# Patient Record
Sex: MALE | Race: BLACK OR AFRICAN AMERICAN | NOT HISPANIC OR LATINO | URBAN - METROPOLITAN AREA
[De-identification: names, ages, dates, MRNs, and addresses within clinical notes are randomized per-mention and may not be internally consistent; named-entity substitution may affect disease eponyms.]

---

## 2017-09-23 ENCOUNTER — HOSPITAL ENCOUNTER (INPATIENT)
Facility: HOSPITAL | Age: 52
LOS: 12 days | Discharge: NURSING, REHAB OR LONG-TERM CARE | DRG: 974 | End: 2017-10-05
Attending: INTERNAL MEDICINE | Admitting: INTERNAL MEDICINE
Payer: MEDICARE

## 2017-09-23 DIAGNOSIS — R40.2433 GLASGOW COMA SCALE TOTAL SCORE 3-8, AT HOSPITAL ADMISSION: ICD-10-CM

## 2017-09-23 DIAGNOSIS — D69.6 THROMBOCYTOPENIA: ICD-10-CM

## 2017-09-23 DIAGNOSIS — J96.01 ACUTE RESPIRATORY FAILURE WITH HYPOXIA AND HYPERCAPNIA: ICD-10-CM

## 2017-09-23 DIAGNOSIS — N18.6 ESRD (END STAGE RENAL DISEASE): ICD-10-CM

## 2017-09-23 DIAGNOSIS — J96.02 ACUTE RESPIRATORY FAILURE WITH HYPOXIA AND HYPERCAPNIA: ICD-10-CM

## 2017-09-23 DIAGNOSIS — B20 AIDS: ICD-10-CM

## 2017-09-23 DIAGNOSIS — I63.9 ISCHEMIC STROKE: ICD-10-CM

## 2017-09-23 DIAGNOSIS — I63.9 STROKE: ICD-10-CM

## 2017-09-23 DIAGNOSIS — R56.9 SEIZURE: ICD-10-CM

## 2017-09-23 DIAGNOSIS — I10 ESSENTIAL HYPERTENSION: ICD-10-CM

## 2017-09-23 DIAGNOSIS — G93.40 ACUTE ENCEPHALOPATHY: ICD-10-CM

## 2017-09-23 DIAGNOSIS — G40.901 STATUS EPILEPTICUS: ICD-10-CM

## 2017-09-23 LAB
ALBUMIN SERPL BCP-MCNC: 2 G/DL
ALP SERPL-CCNC: 101 U/L
ALT SERPL W/O P-5'-P-CCNC: 53 U/L
ANION GAP SERPL CALC-SCNC: 12 MMOL/L
ANISOCYTOSIS BLD QL SMEAR: SLIGHT
AST SERPL-CCNC: 106 U/L
BASOPHILS # BLD AUTO: ABNORMAL K/UL
BASOPHILS NFR BLD: 0 %
BILIRUB SERPL-MCNC: 0.4 MG/DL
BUN SERPL-MCNC: 35 MG/DL
CALCIUM SERPL-MCNC: 7.4 MG/DL
CHLORIDE SERPL-SCNC: 96 MMOL/L
CO2 SERPL-SCNC: 28 MMOL/L
CREAT SERPL-MCNC: 9.4 MG/DL
DIFFERENTIAL METHOD: ABNORMAL
EOSINOPHIL # BLD AUTO: ABNORMAL K/UL
EOSINOPHIL NFR BLD: 0 %
ERYTHROCYTE [DISTWIDTH] IN BLOOD BY AUTOMATED COUNT: 15.7 %
EST. GFR  (AFRICAN AMERICAN): 6.6 ML/MIN/1.73 M^2
EST. GFR  (NON AFRICAN AMERICAN): 5.7 ML/MIN/1.73 M^2
GLUCOSE SERPL-MCNC: 82 MG/DL
HCT VFR BLD AUTO: 25.7 %
HGB BLD-MCNC: 8.4 G/DL
LYMPHOCYTES # BLD AUTO: ABNORMAL K/UL
LYMPHOCYTES NFR BLD: 9 %
MCH RBC QN AUTO: 31.3 PG
MCHC RBC AUTO-ENTMCNC: 32.7 G/DL
MCV RBC AUTO: 96 FL
METAMYELOCYTES NFR BLD MANUAL: 2 %
MONOCYTES # BLD AUTO: ABNORMAL K/UL
MONOCYTES NFR BLD: 18 %
MYELOCYTES NFR BLD MANUAL: 4 %
NEUTROPHILS NFR BLD: 65 %
NEUTS BAND NFR BLD MANUAL: 2 %
OVALOCYTES BLD QL SMEAR: ABNORMAL
PLATELET # BLD AUTO: 63 K/UL
PLATELET BLD QL SMEAR: ABNORMAL
PMV BLD AUTO: ABNORMAL FL
POIKILOCYTOSIS BLD QL SMEAR: SLIGHT
POTASSIUM SERPL-SCNC: 3.9 MMOL/L
PROT SERPL-MCNC: 8 G/DL
RBC # BLD AUTO: 2.68 M/UL
SODIUM SERPL-SCNC: 136 MMOL/L
T4 FREE SERPL-MCNC: 0.94 NG/DL
TSH SERPL DL<=0.005 MIU/L-ACNC: 4.21 UIU/ML
WBC # BLD AUTO: 3.27 K/UL

## 2017-09-23 PROCEDURE — 86361 T CELL ABSOLUTE COUNT: CPT

## 2017-09-23 PROCEDURE — 86592 SYPHILIS TEST NON-TREP QUAL: CPT

## 2017-09-23 PROCEDURE — 25000003 PHARM REV CODE 250: Performed by: STUDENT IN AN ORGANIZED HEALTH CARE EDUCATION/TRAINING PROGRAM

## 2017-09-23 PROCEDURE — 85027 COMPLETE CBC AUTOMATED: CPT

## 2017-09-23 PROCEDURE — 80053 COMPREHEN METABOLIC PANEL: CPT

## 2017-09-23 PROCEDURE — 84439 ASSAY OF FREE THYROXINE: CPT

## 2017-09-23 PROCEDURE — 11000001 HC ACUTE MED/SURG PRIVATE ROOM

## 2017-09-23 PROCEDURE — 36415 COLL VENOUS BLD VENIPUNCTURE: CPT

## 2017-09-23 PROCEDURE — 87040 BLOOD CULTURE FOR BACTERIA: CPT | Mod: 59

## 2017-09-23 PROCEDURE — 63600175 PHARM REV CODE 636 W HCPCS: Performed by: STUDENT IN AN ORGANIZED HEALTH CARE EDUCATION/TRAINING PROGRAM

## 2017-09-23 PROCEDURE — 86777 TOXOPLASMA ANTIBODY: CPT

## 2017-09-23 PROCEDURE — 85007 BL SMEAR W/DIFF WBC COUNT: CPT

## 2017-09-23 PROCEDURE — 84443 ASSAY THYROID STIM HORMONE: CPT

## 2017-09-23 RX ORDER — LEVETIRACETAM 10 MG/ML
1000 INJECTION INTRAVASCULAR EVERY 12 HOURS
Status: DISCONTINUED | OUTPATIENT
Start: 2017-09-23 | End: 2017-09-23

## 2017-09-23 RX ORDER — LEVETIRACETAM 500 MG/1
1000 TABLET ORAL 2 TIMES DAILY
Status: DISCONTINUED | OUTPATIENT
Start: 2017-09-23 | End: 2017-09-25

## 2017-09-23 RX ORDER — HYDRALAZINE HYDROCHLORIDE 50 MG/1
50 TABLET, FILM COATED ORAL ONCE
Status: COMPLETED | OUTPATIENT
Start: 2017-09-24 | End: 2017-09-24

## 2017-09-23 RX ORDER — HYDRALAZINE HYDROCHLORIDE 50 MG/1
50 TABLET, FILM COATED ORAL EVERY 8 HOURS
Status: DISCONTINUED | OUTPATIENT
Start: 2017-09-23 | End: 2017-09-23

## 2017-09-23 RX ORDER — ATORVASTATIN CALCIUM 10 MG/1
10 TABLET, FILM COATED ORAL NIGHTLY
Status: DISCONTINUED | OUTPATIENT
Start: 2017-09-23 | End: 2017-09-25

## 2017-09-23 RX ORDER — HYDRALAZINE HYDROCHLORIDE 50 MG/1
100 TABLET, FILM COATED ORAL EVERY 8 HOURS
Status: DISCONTINUED | OUTPATIENT
Start: 2017-09-24 | End: 2017-09-25

## 2017-09-23 RX ADMIN — LEVETIRACETAM 1000 MG: 500 TABLET ORAL at 11:09

## 2017-09-23 RX ADMIN — HYDRALAZINE HYDROCHLORIDE 50 MG: 50 TABLET ORAL at 11:09

## 2017-09-23 RX ADMIN — CEFTRIAXONE 2 G: 2 INJECTION, SOLUTION INTRAVENOUS at 11:09

## 2017-09-23 RX ADMIN — ATORVASTATIN CALCIUM 10 MG: 10 TABLET, FILM COATED ORAL at 11:09

## 2017-09-23 NOTE — PLAN OF CARE
Outside Transfer Acceptance Note    Transferring Physician or Mid Level Provider/Speciality: Dr. Thomas Gay/Hospital Medicine    Accepting Physician: Anca Noble     Date of Acceptance: 09/23/2017     Transferring Facility/Hospital: [facility]    Reason for Transfer to [facility]: Neurosurgery and Infectious Diseases evaluation    Report from Transferring Physician or Mid-Level provider/Hospital course: A 52-year-old male  with AIDS (CD 49), not on ART or OI prophylaxis, ESRD on HD (M,W,F), and seizure disorder who was at [facility] for arranged HD sessions. On routine laboratory work up he was found to be thrombocytopenic at 19,000 and with hypertensive urgency. He was admitted for further management with control of blood pressure and thrombocytopenia. However his hospital course was complicated by encephalopathy with alternating mentation between his baseline and lethargy. Diagnostic non contrast MRI revealed multiple areas concerning for ischemic stroke from a possible cardio embolic source. Secondary stroke prophylaxis started with aspirin was begun and TTE negative for thrombotic etiologies. Mentation improved however, 24 hours ago patient was once again lethargic and confused. Recommendation was given for LP but due to PLT of 52,000 service with capacity to perform felt uncomfortable due to lack of Neurosurgery. No current evidence of seizure activity. Unclear if patient had EEG performed.     In addition, patient was febrile on admission and several days thereafter with temperature ranging up to 102.9. Urinalysis with pyuria. He was started on empiric ceftriaxone 2 gm BID and Vancomycin post HD. After blood cultures were NGTD vancomycin was discontinued. However, ceftriaxone therapy was continued. Urine culture was NG.     To do list upon patient arrival:    -Consult Neurosurgery or appropriate service for LP   -Consider ID service evaluation   -Consider  repeating TTE or performing MARISA if indicated   -Assess need for continued antibiotic therapy    -Consult Nephology service to resume intermittent HD   -Consider toxoplasma IgG, AFB blood culture, and other infectious work up as indicated    Please call extension [phone] upon patient arrival to floor for Hospital Medicine admit team assignment and for additional admit orders. If patient is coming from another [facility] facility please also call [phone] to inform the admit team/office that patient has arrived from the [facility] facility to the floor so patient can be evaluated.

## 2017-09-24 PROBLEM — B20 AIDS: Status: ACTIVE | Noted: 2017-09-24

## 2017-09-24 PROBLEM — G93.40 ACUTE ENCEPHALOPATHY: Status: ACTIVE | Noted: 2017-09-24

## 2017-09-24 PROBLEM — R56.9 SEIZURE: Status: ACTIVE | Noted: 2017-09-24

## 2017-09-24 PROBLEM — N18.6 ESRD (END STAGE RENAL DISEASE): Status: ACTIVE | Noted: 2017-09-24

## 2017-09-24 PROBLEM — I10 HTN (HYPERTENSION): Status: ACTIVE | Noted: 2017-09-24

## 2017-09-24 LAB
ALBUMIN SERPL BCP-MCNC: 2 G/DL
AMPHET+METHAMPHET UR QL: NEGATIVE
ANION GAP SERPL CALC-SCNC: 17 MMOL/L
ANISOCYTOSIS BLD QL SMEAR: SLIGHT
BARBITURATES UR QL SCN>200 NG/ML: NEGATIVE
BASOPHILS # BLD AUTO: ABNORMAL K/UL
BASOPHILS NFR BLD: 0 %
BENZODIAZ UR QL SCN>200 NG/ML: NEGATIVE
BUN SERPL-MCNC: 39 MG/DL
BZE UR QL SCN: NEGATIVE
CALCIUM SERPL-MCNC: 7.5 MG/DL
CANNABINOIDS UR QL SCN: NEGATIVE
CHLORIDE SERPL-SCNC: 96 MMOL/L
CO2 SERPL-SCNC: 24 MMOL/L
CREAT SERPL-MCNC: 10.1 MG/DL
CREAT UR-MCNC: 56 MG/DL
CRYPTOC AG SER QL LA: NEGATIVE
DIFFERENTIAL METHOD: ABNORMAL
EOSINOPHIL # BLD AUTO: ABNORMAL K/UL
EOSINOPHIL NFR BLD: 1 %
ERYTHROCYTE [DISTWIDTH] IN BLOOD BY AUTOMATED COUNT: 15.6 %
EST. GFR  (AFRICAN AMERICAN): 6 ML/MIN/1.73 M^2
EST. GFR  (NON AFRICAN AMERICAN): 5.2 ML/MIN/1.73 M^2
ETHANOL UR-MCNC: <10 MG/DL
GLUCOSE SERPL-MCNC: 87 MG/DL
HCT VFR BLD AUTO: 27.7 %
HGB BLD-MCNC: 9 G/DL
HYPOCHROMIA BLD QL SMEAR: ABNORMAL
LYMPHOCYTES # BLD AUTO: ABNORMAL K/UL
LYMPHOCYTES NFR BLD: 11 %
MCH RBC QN AUTO: 31.3 PG
MCHC RBC AUTO-ENTMCNC: 32.5 G/DL
MCV RBC AUTO: 96 FL
METAMYELOCYTES NFR BLD MANUAL: 2 %
METHADONE UR QL SCN>300 NG/ML: NEGATIVE
MONOCYTES # BLD AUTO: ABNORMAL K/UL
MONOCYTES NFR BLD: 12 %
MYELOCYTES NFR BLD MANUAL: 1 %
NEUTROPHILS NFR BLD: 69 %
NEUTS BAND NFR BLD MANUAL: 4 %
OPIATES UR QL SCN: NEGATIVE
OVALOCYTES BLD QL SMEAR: ABNORMAL
PCP UR QL SCN>25 NG/ML: NEGATIVE
PHOSPHATE SERPL-MCNC: 4.9 MG/DL
PLATELET # BLD AUTO: 57 K/UL
PMV BLD AUTO: ABNORMAL FL
POCT GLUCOSE: 86 MG/DL (ref 70–110)
POCT GLUCOSE: 92 MG/DL (ref 70–110)
POIKILOCYTOSIS BLD QL SMEAR: SLIGHT
POLYCHROMASIA BLD QL SMEAR: ABNORMAL
POTASSIUM SERPL-SCNC: 3.7 MMOL/L
PROCALCITONIN SERPL IA-MCNC: >100 NG/ML
RBC # BLD AUTO: 2.88 M/UL
SODIUM SERPL-SCNC: 137 MMOL/L
TOXICOLOGY INFORMATION: NORMAL
WBC # BLD AUTO: 3.41 K/UL

## 2017-09-24 PROCEDURE — 80307 DRUG TEST PRSMV CHEM ANLYZR: CPT

## 2017-09-24 PROCEDURE — 87040 BLOOD CULTURE FOR BACTERIA: CPT

## 2017-09-24 PROCEDURE — 80069 RENAL FUNCTION PANEL: CPT

## 2017-09-24 PROCEDURE — 99223 1ST HOSP IP/OBS HIGH 75: CPT | Mod: AI,GC,, | Performed by: HOSPITALIST

## 2017-09-24 PROCEDURE — 99223 1ST HOSP IP/OBS HIGH 75: CPT | Mod: ,,, | Performed by: PSYCHIATRY & NEUROLOGY

## 2017-09-24 PROCEDURE — 86403 PARTICLE AGGLUT ANTBDY SCRN: CPT

## 2017-09-24 PROCEDURE — 25000003 PHARM REV CODE 250: Performed by: STUDENT IN AN ORGANIZED HEALTH CARE EDUCATION/TRAINING PROGRAM

## 2017-09-24 PROCEDURE — 85027 COMPLETE CBC AUTOMATED: CPT

## 2017-09-24 PROCEDURE — 36415 COLL VENOUS BLD VENIPUNCTURE: CPT

## 2017-09-24 PROCEDURE — 84145 PROCALCITONIN (PCT): CPT

## 2017-09-24 PROCEDURE — 11000001 HC ACUTE MED/SURG PRIVATE ROOM

## 2017-09-24 PROCEDURE — 63600175 PHARM REV CODE 636 W HCPCS: Performed by: STUDENT IN AN ORGANIZED HEALTH CARE EDUCATION/TRAINING PROGRAM

## 2017-09-24 PROCEDURE — 85007 BL SMEAR W/DIFF WBC COUNT: CPT

## 2017-09-24 RX ORDER — NAPROXEN SODIUM 220 MG/1
81 TABLET, FILM COATED ORAL DAILY
Status: ON HOLD | COMMUNITY
End: 2017-09-25

## 2017-09-24 RX ORDER — AMLODIPINE BESYLATE 5 MG/1
5 TABLET ORAL DAILY
Status: ON HOLD | COMMUNITY
End: 2017-09-25

## 2017-09-24 RX ORDER — LAMIVUDINE AND ZIDOVUDINE 150; 300 MG/1; MG/1
1 TABLET, FILM COATED ORAL EVERY 12 HOURS
Status: ON HOLD | COMMUNITY
End: 2017-10-05 | Stop reason: HOSPADM

## 2017-09-24 RX ORDER — LOPINAVIR AND RITONAVIR 200; 50 MG/1; MG/1
2 TABLET, FILM COATED ORAL 2 TIMES DAILY
Status: ON HOLD | COMMUNITY
End: 2017-09-25

## 2017-09-24 RX ORDER — AMLODIPINE BESYLATE 5 MG/1
5 TABLET ORAL DAILY
Status: DISCONTINUED | OUTPATIENT
Start: 2017-09-24 | End: 2017-09-25

## 2017-09-24 RX ORDER — CLONIDINE HYDROCHLORIDE 0.1 MG/1
0.1 TABLET ORAL 2 TIMES DAILY
Status: ON HOLD | COMMUNITY
End: 2017-10-05 | Stop reason: HOSPADM

## 2017-09-24 RX ORDER — HYDRALAZINE HYDROCHLORIDE 50 MG/1
50 TABLET, FILM COATED ORAL EVERY 6 HOURS
Status: ON HOLD | COMMUNITY
End: 2017-10-05 | Stop reason: HOSPADM

## 2017-09-24 RX ORDER — DAPSONE 25 MG/1
50 TABLET ORAL DAILY
Status: DISCONTINUED | OUTPATIENT
Start: 2017-09-24 | End: 2017-09-25

## 2017-09-24 RX ORDER — LEUCOVORIN CALCIUM 25 MG/1
25 TABLET ORAL WEEKLY
Status: DISCONTINUED | OUTPATIENT
Start: 2017-09-26 | End: 2017-09-25

## 2017-09-24 RX ORDER — AZITHROMYCIN 600 MG/1
1200 TABLET, FILM COATED ORAL WEEKLY
Status: DISCONTINUED | OUTPATIENT
Start: 2017-09-26 | End: 2017-09-25

## 2017-09-24 RX ORDER — PYRIMETHAMINE 25 MG/1
50 TABLET ORAL WEEKLY
Status: DISCONTINUED | OUTPATIENT
Start: 2017-09-26 | End: 2017-09-25

## 2017-09-24 RX ORDER — HYDRALAZINE HYDROCHLORIDE 10 MG/1
10 TABLET, FILM COATED ORAL ONCE
Status: COMPLETED | OUTPATIENT
Start: 2017-09-24 | End: 2017-09-24

## 2017-09-24 RX ORDER — LOSARTAN POTASSIUM 50 MG/1
50 TABLET ORAL DAILY
Status: ON HOLD | COMMUNITY
End: 2017-09-25

## 2017-09-24 RX ORDER — ATORVASTATIN CALCIUM 10 MG/1
10 TABLET, FILM COATED ORAL DAILY
Status: ON HOLD | COMMUNITY
End: 2017-09-25

## 2017-09-24 RX ORDER — LEVETIRACETAM 1000 MG/1
500 TABLET ORAL
COMMUNITY

## 2017-09-24 RX ADMIN — AMLODIPINE BESYLATE 5 MG: 5 TABLET ORAL at 01:09

## 2017-09-24 RX ADMIN — HYDRALAZINE HYDROCHLORIDE 10 MG: 10 TABLET, FILM COATED ORAL at 09:09

## 2017-09-24 RX ADMIN — DAPSONE 50 MG: 25 TABLET ORAL at 03:09

## 2017-09-24 RX ADMIN — HYDRALAZINE HYDROCHLORIDE 50 MG: 50 TABLET ORAL at 12:09

## 2017-09-24 RX ADMIN — CEFTRIAXONE 2 G: 2 INJECTION, SOLUTION INTRAVENOUS at 11:09

## 2017-09-24 RX ADMIN — HYDRALAZINE HYDROCHLORIDE 100 MG: 50 TABLET ORAL at 01:09

## 2017-09-24 RX ADMIN — LEVETIRACETAM 1000 MG: 500 TABLET ORAL at 09:09

## 2017-09-24 RX ADMIN — HYDRALAZINE HYDROCHLORIDE 100 MG: 50 TABLET ORAL at 09:09

## 2017-09-24 RX ADMIN — HYDRALAZINE HYDROCHLORIDE 100 MG: 50 TABLET ORAL at 05:09

## 2017-09-24 RX ADMIN — ATORVASTATIN CALCIUM 10 MG: 10 TABLET, FILM COATED ORAL at 09:09

## 2017-09-24 NOTE — ASSESSMENT & PLAN NOTE
- Previous history of poorly controlled resulting in ESRD  - OSH noted fluctuations and was previously on Hydralzine 100 Q8  - Continue hydralazine 100 Q8.

## 2017-09-24 NOTE — PLAN OF CARE
Problem: Patient Care Overview  Goal: Plan of Care Review  Outcome: Ongoing (interventions implemented as appropriate)  POC reviewed with pt and family at 1400. Straight cath.  Urinalysis.  PB control.  Food encouraged. Pt was confused. Questions and concerns addressed. No acute events today. Pt progressing toward goals. Will continue to monitor. See flowsheets for full assessment and VS info.

## 2017-09-24 NOTE — ASSESSMENT & PLAN NOTE
- Platelet count noted to be low at OSH 19 on admit, attributed to AIDS  - Given transfusion at OSH   - Will continue to monitor and transfuse at < 12 or < 50 if bleeding

## 2017-09-24 NOTE — SUBJECTIVE & OBJECTIVE
Past Medical History:   Diagnosis Date    ESRD on hemodialysis     HIV (human immunodeficiency virus infection)     Seizures        History reviewed. No pertinent surgical history.    Review of patient's allergies indicates:  No Known Allergies    Current Neurological Medications:     No current facility-administered medications on file prior to encounter.      No current outpatient prescriptions on file prior to encounter.     Family History     Family history is unknown by patient.        Social History Main Topics    Smoking status: Never Smoker    Smokeless tobacco: Never Used    Alcohol use No    Drug use: No    Sexual activity: Yes     Partners: Female     Review of Systems   Constitutional: Positive for fatigue. Negative for chills and fever.   HENT: Negative for trouble swallowing and voice change.    Eyes: Negative for photophobia and visual disturbance.   Respiratory: Negative for shortness of breath.    Cardiovascular: Negative for chest pain.   Gastrointestinal: Negative for abdominal distention and abdominal pain.   Genitourinary: Negative for dysuria.   Musculoskeletal: Negative for back pain.   Neurological: Positive for weakness. Negative for dizziness, tremors, seizures, syncope, facial asymmetry, speech difficulty, light-headedness, numbness and headaches.   Psychiatric/Behavioral: Positive for decreased concentration. Negative for agitation, confusion and hallucinations. The patient is not nervous/anxious.      Objective:     Vital Signs (Most Recent):  Temp: 98.1 °F (36.7 °C) (09/24/17 0722)  Pulse: 89 (09/24/17 0722)  Resp: 18 (09/24/17 0722)  BP: (!) 177/115 (09/24/17 0934)  SpO2: 96 % (09/24/17 0722) Vital Signs (24h Range):  Temp:  [97.6 °F (36.4 °C)-98.6 °F (37 °C)] 98.1 °F (36.7 °C)  Pulse:  [86-99] 89  Resp:  [16-20] 18  SpO2:  [96 %-99 %] 96 %  BP: (155-177)/(100-115) 177/115     Weight: 70.6 kg (155 lb 9.6 oz)  Body mass index is 21.1 kg/m².    Physical Exam   Constitutional: No  distress.   HENT:   Head: Normocephalic and atraumatic.   Eyes: EOM are normal. Pupils are equal, round, and reactive to light.   Neck: Normal range of motion.   Cardiovascular: Normal heart sounds.    Pulmonary/Chest: He has wheezes.   Abdominal: Bowel sounds are normal.   Neurological: Finger-nose-finger test: Mild Ataxia    Reflex Scores:       Tricep reflexes are 3+ on the right side and 3+ on the left side.       Bicep reflexes are 3+ on the right side and 3+ on the left side.       Brachioradialis reflexes are 3+ on the right side and 3+ on the left side.       Patellar reflexes are 3+ on the right side and 3+ on the left side.       Achilles reflexes are 3+ on the right side and 3+ on the left side.  Psychiatric: His speech is slurred.       NEUROLOGICAL EXAMINATION:     MENTAL STATUS   Oriented to person.   Oriented to place.   Disoriented to month and date. Oriented to year.   Registration: recalls 1 of 3 objects. Recall at 5 minutes: recalls 1 of 3 objects. Follows 1 step commands.   Attention: decreased. Concentration: decreased.   Speech: slurred   Level of consciousness: drowsy  Able to name object. Able to repeat.     CRANIAL NERVES     CN II   Visual acuity: decreased    CN III, IV, VI   Pupils are equal, round, and reactive to light.  Extraocular motions are normal.   Nystagmus: none   Ophthalmoparesis: none  Vestibulo-ocular reflex: present    CN V   Facial sensation intact.     CN VII   Facial expression full, symmetric.     CN VIII   CN VIII normal.     CN IX, X   CN IX normal.   CN X normal.     CN XI   CN XI normal.     CN XII   CN XII normal.     MOTOR EXAM   Muscle bulk: normal  Overall muscle tone: decreased    Strength   Strength 5/5 except as noted.        Distal hand weakness RT > LT     REFLEXES     Reflexes   Right brachioradialis: 3+  Left brachioradialis: 3+  Right biceps: 3+  Left biceps: 3+  Right triceps: 3+  Left triceps: 3+  Right patellar: 3+  Left patellar: 3+  Right achilles:  3+  Left achilles: 3+  Right plantar: equivocal  Left plantar: equivocal  Right Bates: present  Left Bates: present  Right ankle clonus: absent  Left ankle clonus: absent    SENSORY EXAM   Vibration normal.   Pinprick normal.     GAIT AND COORDINATION     Gait  Gait: (NT)     Coordination   Finger-nose-finger test: Mild Ataxia     Tremor   Resting tremor: absent  Intention tremor: absent      Significant Labs:   CBC:   Recent Labs  Lab 09/23/17 2039 09/24/17  0351   WBC 3.27* 3.41*   HGB 8.4* 9.0*   HCT 25.7* 27.7*   PLT 63* 57*     CMP:   Recent Labs  Lab 09/23/17 2039 09/24/17  0351   GLU 82 87    137   K 3.9 3.7   CL 96 96   CO2 28 24   BUN 35* 39*   CREATININE 9.4* 10.1*   CALCIUM 7.4* 7.5*   PROT 8.0  --    ALBUMIN 2.0* 2.0*   BILITOT 0.4  --    ALKPHOS 101  --    *  --    ALT 53*  --    ANIONGAP 12 17*   EGFRNONAA 5.7* 5.2*     CSF Culture: No results for input(s): CSFCULTURE in the last 48 hours.  CSF Studies: No results for input(s): ALIQUT, APPEARCSF, COLORCSF, CSFWBC, CSFRBC, GLUCCSF, LDHCSF, PROTEINCSF, VDRLCSF in the last 48 hours.  Urine Studies: No results for input(s): COLORU, APPEARANCEUA, PHUR, SPECGRAV, PROTEINUA, GLUCUA, KETONESU, BILIRUBINUA, OCCULTUA, NITRITE, UROBILINOGEN, LEUKOCYTESUR, RBCUA, WBCUA, BACTERIA, SQUAMEPITHEL, HYALINECASTS in the last 48 hours.    Invalid input(s): WRIGHTSUR  All pertinent lab results from the past 24 hours have been reviewed.    Significant Imaging: I have reviewed and interpreted all pertinent imaging results/findings within the past 24 hours.

## 2017-09-24 NOTE — PROGRESS NOTES
Upon assessment, Pt. Has dime-sized pustule, raised about 5 cm, on the head of the penis, under the foreskin.     He also has no appetite and is becoming increasingly lethargic.    Will continue to monitor.

## 2017-09-24 NOTE — ASSESSMENT & PLAN NOTE
- 53 yearold male with AIDS (CD 49 not on ART or PPx), ESRD on HD (M,W,F), and seizure disorder presenting as a transfer from [facility] for further evaluation for encephaopathy.   - Significant events at outside hospital includes fluctuating level of consciousness / encephalopathic state with multifactorial etiologies HTN urgency / ischemic stroke (watershed infarcts) / ? NCSE / infections (possible UTI source / undone meningitis work up).     - examination reveals improved level of awareness / consciousness. However with attention / concentration / recent memory affect, with UMN findings correlating with watershed infarcts.   - MRI Brain wo contrast outside negative for space occupying lesions     - DDx Dementia complex from AIDS/HIV complex vs encephalitis from infectious vs toxic vs metabolic vs space occupying lesions     Plan:   - MRI Brain w wo contrast for evaluations of mass / contrast enhancing lesions / sub-acute encephalitis / meningeal enhancements  - Consult ID for recs / HIV management    - Serum studies (if not done outside):   ---- HSV / VZV / CMV / WNV / cryptococcal / toxoplasmosis / syphilis / TB / utox / vasculitis (ANCA) / autoimmune encephalitis / culutres bacteria / fungal   - Perform LP studies   ---- cell count / diff / glucose / protein / cultures / gram stain   ---- HSV / VZV / CMV / WNV / Enterovirus / Arbo cryptococcal / toxoplasmosis / syphilis / TB / utox / vasculitis (ANCA) / autoimmune encephalitis / culutres bacteria / fungal     - Stroke work-up (more likely watershed / hypoperfusion with underlying chronic HTN changes)  ---- MRI brain w wo contrast / MRA head / neck   ---- TTE to rule out source / cardiac monitoring / A1c, lipid panel   ---- ASA / statins if not contraindicated   ---- Vascular neurology consult      - EEG rotuine post MRI for eval of epileptiform activitty   - seizure / delirium precautions / avoid metabolic abnormalities / nephro consult for HD /  avoid hypoxias / hypercapnia

## 2017-09-24 NOTE — ASSESSMENT & PLAN NOTE
- History unclear in OSH chart, Presented someone altered, unknown baseline, got worst in hospital s/p seizure with waxing and waning   - Patient notes clumsiness and trouble with fine motor skills at baseline 2/2 to neck injury years ago   - Given history feel HIV encephalopathy, seizure or medication induced is most likely cause of lethargy, however given immunocompromised status feel LP can help rule out concern for infection/PML given white mater involvement on MRI and concern for chronic worsening mental status.     - MRI impression showed abnormal gyriform diffusion and abnormal white matter, periventricular with extension into left and right basal ganglia.  Concern for ischemia/infarct, seizure, and cerebritis  - Neurology at OSH originally attributed behavior to CVA followed by stroke and treated with Keppra, however patient continued to wax and wane.  - Neurology recommend LP, however was uncomfortable given thrombocytopenia and no backup neurosurgery, so they transferred him to [facility] main.    - Consult Neurology in the AM, Obtain LP in AM, Speak with wife regarding mental baseline (number in sticky)  - Consider repeat TTE or MARISA given concern for recurrent stroke.

## 2017-09-24 NOTE — SUBJECTIVE & OBJECTIVE
Past Medical History:   Diagnosis Date    ESRD on hemodialysis     HIV (human immunodeficiency virus infection)     Seizures        No past surgical history on file.    Review of patient's allergies indicates:  No Known Allergies    Unaware of outpatient medications    OSH   Ceftriaxone 2g BID, Vanc 1g with dialysis, Keppra 1000mg BID, Hydralazine 100 Q8, Atorvastatin 10 QHS,  ASA 81 daily.     Family History     None        Social History Main Topics    Smoking status: Not on file    Smokeless tobacco: Not on file    Alcohol use Not on file    Drug use: Unknown    Sexual activity: Not on file     Review of Systems   Constitutional: Negative for fatigue and fever.   HENT: Negative for congestion, hearing loss and sinus pressure.    Eyes: Negative for pain and visual disturbance.   Respiratory: Negative for chest tightness and shortness of breath.    Cardiovascular: Negative for chest pain and palpitations.   Gastrointestinal: Negative for abdominal pain, blood in stool, constipation, diarrhea, nausea and vomiting.   Genitourinary: Negative for dysuria, frequency and urgency.   Musculoskeletal: Negative for arthralgias, back pain and myalgias.   Skin: Negative for color change and rash.   Neurological: Positive for seizures and weakness. Negative for dizziness, syncope, speech difficulty, light-headedness and headaches.        Trouble with fine motor movement in bilateral hands    Hematological: Negative for adenopathy. Does not bruise/bleed easily.   Psychiatric/Behavioral: Negative for confusion, self-injury and sleep disturbance. The patient is not nervous/anxious.      Objective:     Vital Signs (Most Recent):  Temp: 97.8 °F (36.6 °C) (09/23/17 2322)  Pulse: 94 (09/23/17 2322)  Resp: 20 (09/23/17 2322)  BP: (!) 168/100 (09/23/17 2322)  SpO2: 98 % (09/23/17 2322) Vital Signs (24h Range):  Temp:  [97.8 °F (36.6 °C)-98.6 °F (37 °C)] 97.8 °F (36.6 °C)  Pulse:  [94-99] 94  Resp:  [20] 20  SpO2:  [98 %] 98  %  BP: (155-168)/(100) 168/100        There is no height or weight on file to calculate BMI.    Physical Exam   Constitutional: He is oriented to person, place, and time. He appears well-developed and well-nourished.   HENT:   Head: Normocephalic and atraumatic.   Right Ear: External ear normal.   Left Ear: External ear normal.   Eyes: Conjunctivae and EOM are normal. Pupils are equal, round, and reactive to light.   Neck: Normal range of motion. Neck supple.   Neck supple, not stiff    Cardiovascular: Normal rate, regular rhythm, normal heart sounds and intact distal pulses.    Pulmonary/Chest: Effort normal and breath sounds normal.   Abdominal: Soft. Bowel sounds are normal.   Musculoskeletal: Normal range of motion. He exhibits no edema.   Neurological: He is alert and oriented to person, place, and time. No cranial nerve deficit. Coordination abnormal.   Patient is alert and oriented x 4, however he occasional answers questions inappropriately, and is frequently does not remember things from the past.    No gross focal weakness but has trouble with coordination.    Bilateral hand difficulty with fine motor skills witness on physical exam, noted as chronic.      Skin: Skin is warm and dry.   Psychiatric: He has a normal mood and affect. His behavior is normal. Judgment and thought content normal.   Vitals reviewed.       Significant Labs: All pertinent labs within the past 24 hours have been reviewed.  OSH Labs show CD 4 of 49  Platelets 69      Significant Imaging: I have reviewed all pertinent imaging results/findings within the past 24 hours.

## 2017-09-24 NOTE — ASSESSMENT & PLAN NOTE
- Platelet count noted to be low at OSH 19 on admit, attributed to AIDS  - Given transfusion at OSH with good response  - Will continue to monitor and transfuse at < 12 or < 50 if bleeding

## 2017-09-24 NOTE — HPI
Volodymyr Walden is a 51 yearold male with AIDS (CD 49 not on ART or PPx), ESRD on HD (M,W,F), and seizure disorder presenting as a transfer from [facility] for Neurology eval and LP.     Significant events at outside hospital includes fluctuating level of consciousness / encephalopathic state with multifactorial etiologies HTN urgency / ischemic stroke (watershed infarcts) / ? NCSE / infections (possible UTI source / undone meningitis work up). With broad-spectrum management, patient had improvement in mentation's / with increased level of awareness / orientations. Currently denies fever, chills, HA, neck pain, Visual disturbances, change in BM, abdominal pain, nausea or vomiting.      On further reviewing his history, patient reveals diagnosis of HIV 4 years ago / unprotected sexual behavior trasmission, with f/u with ID / PCP at [place]. Despite the fact, he reports of being on ARTs / Abx prophylaxis management, unclear about any further details with regards to the same.

## 2017-09-24 NOTE — PROGRESS NOTES
"Upon bladder scan pt. Is retaining 311 mL fluid.  When asked to urinate he claims someone has instructed him not to for a "test tomorrow."  I instructed him otherwise.  He has still not urinated.  Will continue to monitor.  "

## 2017-09-24 NOTE — H&P
[facility] Hospital Medicine  History & Physical    Patient Name: Volodymyr Pope  MRN: [id number]  Admission Date: 9/23/2017  Attending Physician: Maryuri Song MD   Primary Care Provider: Primary Doctor [facility] Medicine Team: Networked reference to record PCT  Prabhjot Monteiro MD     Patient information was obtained from patient and ER records.     Subjective:     Principal Problem:Acute encephalopathy    Chief Complaint: Transfer for LP     HPI: 51 yearold male with AIDS (CD 49 not on ART or PPx), ESRD on HD (M,W,F), and seizure disorder presents as a transfer from [facility] for Neurology eval and LP.  Patient presented to OSH on 9/15 from a dialysis center for HTN.  He was admitted for HTN urgency and routine workup revealed thrombocytopenia of 19,000.  His hospital course was complicated by AMS with lethargy.  MRI revealed multiple areas concerning for ischemic stroke/ seizure.  Neurology initially felt his AMS and resulting seizure was 2/2 to stroke and started him on Keppra, ASA and stain.  His mental status initially improved however he was found to be HTN with a fever and spent a brief time in the ICU.  Patient was started on ceftriaxone and Vanc given concern for UTI.  After stepping down to the floor patient was once again found to be lethargic and confused.  Neurology wished to pursue LP but due to plantlet count of 52,000 they felt uncomfortable due to lack of Neurosurgery.  On evaluation patient is alert and oriented x 4.  He answers most questions appropriately but does appear to be forgetful with possible confabulation.  He reports he is doing great and attributes his confusion due to seizure with he reports improved on Keppra.  He otherwise reports he feels in his normal state of health and is anxious regarding LP.  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  He lives in [place] with his wife but moved to [place] for work.  He was presenting for HD and felt great prior to admission to the hospital.  He reports a past medical history of HIV first diagnosed 4 years ago in addition to ESRD due to poor HTN control.  Reports tunnel cath placed in May, however OSH records unclear if changed.  Reports he is able to attend to all of his ADLs independently but does not some fine motor skill defect in his bilateral hands attributed to a neck injury "years" ago.  Regarding his HIV he reports he was on therapy but does not remember what or when he stopped taking.  Is unaware of he was on Abx ppx.  His wife is aware of his HIV and he reports silvina it during a sexual encounter.  He is a never smoker, social drinker who denies drug use.                      Past Medical History:   Diagnosis Date    ESRD on hemodialysis     HIV (human immunodeficiency virus infection)     Seizures        No past surgical history on file.    Review of patient's allergies indicates:  No Known Allergies    Unaware of outpatient medications    OSH   Ceftriaxone 2g BID, Vanc 1g with dialysis, Keppra 1000mg BID, Hydralazine 100 Q8, Atorvastatin 10 QHS,  ASA 81 daily.     Family History     None        Social History Main Topics    Smoking status: Not on file    Smokeless tobacco: Not on file    Alcohol use Not on file    Drug use: Unknown    Sexual activity: Not on file     Review of Systems   Constitutional: Negative for fatigue and fever.   HENT: Negative for congestion, hearing loss and sinus pressure.    Eyes: Negative for pain and visual disturbance.   Respiratory: Negative for chest tightness and shortness of breath.    Cardiovascular: Negative for chest pain and palpitations.   Gastrointestinal: Negative for abdominal pain, blood in stool, constipation, diarrhea, nausea and vomiting.   Genitourinary: Negative for dysuria, frequency and " urgency.   Musculoskeletal: Negative for arthralgias, back pain and myalgias.   Skin: Negative for color change and rash.   Neurological: Positive for seizures and weakness. Negative for dizziness, syncope, speech difficulty, light-headedness and headaches.        Trouble with fine motor movement in bilateral hands    Hematological: Negative for adenopathy. Does not bruise/bleed easily.   Psychiatric/Behavioral: Negative for confusion, self-injury and sleep disturbance. The patient is not nervous/anxious.      Objective:     Vital Signs (Most Recent):  Temp: 97.8 °F (36.6 °C) (09/23/17 2322)  Pulse: 94 (09/23/17 2322)  Resp: 20 (09/23/17 2322)  BP: (!) 168/100 (09/23/17 2322)  SpO2: 98 % (09/23/17 2322) Vital Signs (24h Range):  Temp:  [97.8 °F (36.6 °C)-98.6 °F (37 °C)] 97.8 °F (36.6 °C)  Pulse:  [94-99] 94  Resp:  [20] 20  SpO2:  [98 %] 98 %  BP: (155-168)/(100) 168/100        There is no height or weight on file to calculate BMI.    Physical Exam   Constitutional: He is oriented to person, place, and time. He appears well-developed and well-nourished.   HENT:   Head: Normocephalic and atraumatic.   Right Ear: External ear normal.   Left Ear: External ear normal.   Eyes: Conjunctivae and EOM are normal. Pupils are equal, round, and reactive to light.   Neck: Normal range of motion. Neck supple.   Neck supple, not stiff    Cardiovascular: Normal rate, regular rhythm, normal heart sounds and intact distal pulses.    Pulmonary/Chest: Effort normal and breath sounds normal. Tunnel cath, no obvious signs of infection, erythema, or pus.  Abdominal: Soft. Bowel sounds are normal.   Musculoskeletal: Normal range of motion. He exhibits no edema.   Neurological: He is alert and oriented to person, place, and time. No cranial nerve deficit. Coordination abnormal.   Patient is alert and oriented x 4, however he occasional answers questions inappropriately, and is frequently does not remember things from the past.    No gross  focal weakness but has trouble with coordination.    Bilateral hand difficulty with fine motor skills witness on physical exam, noted as chronic.      Skin: Skin is warm and dry.   Psychiatric: He has a normal mood and affect. His behavior is normal. Judgment and thought content normal.   Vitals reviewed.       Significant Labs: All pertinent labs within the past 24 hours have been reviewed.  OSH Labs show CD 4 of 49  Platelets 69      Significant Imaging: I have reviewed all pertinent imaging results/findings within the past 24 hours.    Assessment/Plan:     * Acute encephalopathy    - History unclear in OSH chart, Presented someone altered, unknown baseline, got worst in hospital s/p seizure with waxing and waning   - Patient notes clumsiness and trouble with fine motor skills at baseline 2/2 to neck injury years ago   - Given history feel HIV encephalopathy, seizure or medication induced is most likely cause of lethargy, however given immunocompromised status feel LP can help rule out concern for infection/PML given white mater involvement on MRI and concern for chronic worsening mental status.     - MRI impression showed abnormal gyriform diffusion and abnormal white matter, periventricular with extension into left and right basal ganglia.  Concern for ischemia/infarct, seizure, and cerebritis  - Neurology at OSH originally attributed behavior to CVA followed by stroke and treated with Keppra, however patient continued to wax and wane.  - History of fever now resolved previously on ceftriaxone 2g BID and Vanc with dialysis, Will continue ceftriaxone 2g daily (feel acute bacterial meningitis unlikely), and reassess need for Vanc, procal in AM.     - Consult Neurology in the AM, Obtain LP in AM, Speak with wife regarding mental baseline (number in sticky)  - Consider repeat TTE or MARISA given concern for recurrent stroke.                AIDS    - Per OSH records previous CD4 count 49, patient reports diagnosis 4  years ago  - Meets criteria for MAC, Toxo, PCP  - Given Dapsone, Pyrimethamine and Leucovorin on 9/19, dose is weekly for PCP and toxo ppx    - Given azithromycin 9/19 for Mac PPX, dosed weekly   - Patient unaware of previous HAART regimen but does report taking something in the past   - Consider ID consult regarding concern for infection and HIV HAART recommendations             ESRD (end stage renal disease)    - Per patient 2/2 to HTN  - Nephrology consult in AM, patient is HD MWF           HTN (hypertension)    - Previous history of poorly controlled resulting in ESRD  - OSH noted fluctuations and was previously on Hydralzine 100 Q8  - Continue hydralazine 100 Q8.           Seizure    - Continue Keppra 1g BID          Thrombocytopenia    - Platelet count noted to be low at OSH 19 on admit, attributed to AIDS  - Given transfusion at OSH with good response  - Will continue to monitor and transfuse at < 12 or < 50 if bleeding             VTE Risk Mitigation         Ordered     Place sequential compression device  Until discontinued      09/23/17 2023     Medium Risk of VTE  Once      09/23/17 2023         Dispo: Admit to medicine. Consult nephrology and neurology in AM, consider LP and continuous EEG. Consider ID consult in AM.  Will continue IV ceftriaxone, will add vanc later if continued concern for infection.        Prabhjot Monteiro MD  Department of Hospital Medicine   [facility]

## 2017-09-24 NOTE — PLAN OF CARE
Problem: Patient Care Overview  Goal: Plan of Care Review  Outcome: Ongoing (interventions implemented as appropriate)  Pt arrived at 1932 via ambo transfer from [facility]. Pt has no c/o pain/distress noted. Pt is not a good historian with medical history or medications taken at home. Medication list obtained from pt chart. Pt is alert, but confused with some conversation. BP elevated, with meds provided as ordered. Pt oriented to room, scds in place, urinal at bedside. Will continue to monitor.

## 2017-09-24 NOTE — ASSESSMENT & PLAN NOTE
- History unclear in OSH chart, Presented someone altered, unknown baseline, got worst in hospital s/p seizure with waxing and waning   - Patient notes clumsiness and trouble with fine motor skills at baseline 2/2 to neck injury years ago   - LP deferred due to thrombocytopenia.   - MRI with evidence of cerebritis  -initially intubated 2/2 encephalopathic state, greatly improved since admission, GCS 15, tolerating pureed diet, sitting in bedside chair, stable to TTF  - Now suspecting HIV encephalitis   - Histo antigen negative. Will d/c amphotericin.   - Per ID: will start Combivir and Kaletra

## 2017-09-24 NOTE — CONSULTS
[facility]  Neurology  Consult Note    Patient Name: Volodymyr Pope  MRN: [id number]  Admission Date: 9/23/2017  Hospital Length of Stay: 1 days  Code Status: Full Code   Attending Provider: Enma Song MD   Consulting Provider: Thiago Holt MD  Primary Care Physician: Primary Doctor No  Principal Problem:Acute encephalopathy    Inpatient consult to Neurology  Consult performed by: THIAGO HOLT  Consult ordered by: RONNA BERG         Subjective:     Chief Complaint:  encephalopathy      HPI:   Volodymyr Walden is a 53 yearold male with AIDS (CD 49 not on ART or PPx), ESRD on HD (M,W,F), and seizure disorder presenting as a transfer from [facility] for Neurology eval and LP.     Significant events at outside hospital includes fluctuating level of consciousness / encephalopathic state with multifactorial etiologies HTN urgency / ischemic stroke (watershed infarcts) / ? NCSE / infections (possible UTI source / undone meningitis work up). With broad-spectrum management, patient had improvement in mentation's / with increased level of awareness / orientations. Currently denies fever, chills, HA, neck pain, Visual disturbances, change in BM, abdominal pain, nausea or vomiting.      On further reviewing his history, patient reveals diagnosis of HIV 4 years ago / unprotected sexual behavior trasmission, with f/u with ID / PCP at [facility]. Despite the fact, he reports of being on ARTs / Abx prophylaxis management, unclear about any further details with regards to the same.      Past Medical History:   Diagnosis Date    ESRD on hemodialysis     HIV (human immunodeficiency virus infection)     Seizures        History reviewed. No pertinent surgical history.    Review of patient's allergies indicates:  No Known Allergies    Current Neurological Medications:     No current facility-administered medications on file prior to encounter.      No current outpatient  prescriptions on file prior to encounter.     Family History     Family history is unknown by patient.        Social History Main Topics    Smoking status: Never Smoker    Smokeless tobacco: Never Used    Alcohol use No    Drug use: No    Sexual activity: Yes     Partners: Female     Review of Systems   Constitutional: Positive for fatigue. Negative for chills and fever.   HENT: Negative for trouble swallowing and voice change.    Eyes: Negative for photophobia and visual disturbance.   Respiratory: Negative for shortness of breath.    Cardiovascular: Negative for chest pain.   Gastrointestinal: Negative for abdominal distention and abdominal pain.   Genitourinary: Negative for dysuria.   Musculoskeletal: Negative for back pain.   Neurological: Positive for weakness. Negative for dizziness, tremors, seizures, syncope, facial asymmetry, speech difficulty, light-headedness, numbness and headaches.   Psychiatric/Behavioral: Positive for decreased concentration. Negative for agitation, confusion and hallucinations. The patient is not nervous/anxious.      Objective:     Vital Signs (Most Recent):  Temp: 98.1 °F (36.7 °C) (09/24/17 0722)  Pulse: 89 (09/24/17 0722)  Resp: 18 (09/24/17 0722)  BP: (!) 177/115 (09/24/17 0934)  SpO2: 96 % (09/24/17 0722) Vital Signs (24h Range):  Temp:  [97.6 °F (36.4 °C)-98.6 °F (37 °C)] 98.1 °F (36.7 °C)  Pulse:  [86-99] 89  Resp:  [16-20] 18  SpO2:  [96 %-99 %] 96 %  BP: (155-177)/(100-115) 177/115     Weight: 70.6 kg (155 lb 9.6 oz)  Body mass index is 21.1 kg/m².    Physical Exam   Constitutional: No distress.   HENT:   Head: Normocephalic and atraumatic.   Eyes: EOM are normal. Pupils are equal, round, and reactive to light.   Neck: Normal range of motion.   Cardiovascular: Normal heart sounds.    Pulmonary/Chest: He has wheezes.   Abdominal: Bowel sounds are normal.   Neurological: Finger-nose-finger test: Mild Ataxia    Reflex Scores:       Tricep reflexes are 3+ on the right  side and 3+ on the left side.       Bicep reflexes are 3+ on the right side and 3+ on the left side.       Brachioradialis reflexes are 3+ on the right side and 3+ on the left side.       Patellar reflexes are 3+ on the right side and 3+ on the left side.       Achilles reflexes are 3+ on the right side and 3+ on the left side.  Psychiatric: His speech is slurred.       NEUROLOGICAL EXAMINATION:     MENTAL STATUS   Oriented to person.   Oriented to place.   Disoriented to month and date. Oriented to year.   Registration: recalls 1 of 3 objects. Recall at 5 minutes: recalls 1 of 3 objects. Follows 1 step commands.   Attention: decreased. Concentration: decreased.   Speech: slurred   Level of consciousness: drowsy  Able to name object. Able to repeat.     CRANIAL NERVES     CN II   Visual acuity: decreased    CN III, IV, VI   Pupils are equal, round, and reactive to light.  Extraocular motions are normal.   Nystagmus: none   Ophthalmoparesis: none  Vestibulo-ocular reflex: present    CN V   Facial sensation intact.     CN VII   Facial expression full, symmetric.     CN VIII   CN VIII normal.     CN IX, X   CN IX normal.   CN X normal.     CN XI   CN XI normal.     CN XII   CN XII normal.     MOTOR EXAM   Muscle bulk: normal  Overall muscle tone: decreased    Strength   Strength 5/5 except as noted.        Distal hand weakness RT > LT     REFLEXES     Reflexes   Right brachioradialis: 3+  Left brachioradialis: 3+  Right biceps: 3+  Left biceps: 3+  Right triceps: 3+  Left triceps: 3+  Right patellar: 3+  Left patellar: 3+  Right achilles: 3+  Left achilles: 3+  Right plantar: equivocal  Left plantar: equivocal  Right Bates: present  Left Bates: present  Right ankle clonus: absent  Left ankle clonus: absent    SENSORY EXAM   Vibration normal.   Pinprick normal.     GAIT AND COORDINATION     Gait  Gait: (NT)     Coordination   Finger-nose-finger test: Mild Ataxia     Tremor   Resting tremor: absent  Intention tremor:  absent      Significant Labs:   CBC:   Recent Labs  Lab 09/23/17 2039 09/24/17  0351   WBC 3.27* 3.41*   HGB 8.4* 9.0*   HCT 25.7* 27.7*   PLT 63* 57*     CMP:   Recent Labs  Lab 09/23/17 2039 09/24/17  0351   GLU 82 87    137   K 3.9 3.7   CL 96 96   CO2 28 24   BUN 35* 39*   CREATININE 9.4* 10.1*   CALCIUM 7.4* 7.5*   PROT 8.0  --    ALBUMIN 2.0* 2.0*   BILITOT 0.4  --    ALKPHOS 101  --    *  --    ALT 53*  --    ANIONGAP 12 17*   EGFRNONAA 5.7* 5.2*     CSF Culture: No results for input(s): CSFCULTURE in the last 48 hours.  CSF Studies: No results for input(s): ALIQUT, APPEARCSF, COLORCSF, CSFWBC, CSFRBC, GLUCCSF, LDHCSF, PROTEINCSF, VDRLCSF in the last 48 hours.  Urine Studies: No results for input(s): COLORU, APPEARANCEUA, PHUR, SPECGRAV, PROTEINUA, GLUCUA, KETONESU, BILIRUBINUA, OCCULTUA, NITRITE, UROBILINOGEN, LEUKOCYTESUR, RBCUA, WBCUA, BACTERIA, SQUAMEPITHEL, HYALINECASTS in the last 48 hours.    Invalid input(s): WRIGHTSUR  All pertinent lab results from the past 24 hours have been reviewed.    Significant Imaging: I have reviewed and interpreted all pertinent imaging results/findings within the past 24 hours.    Assessment and Plan:     * Acute encephalopathy    - 51 yearold male with AIDS (CD 49 not on ART or PPx), ESRD on HD (M,W,F), and seizure disorder presenting as a transfer from [facility] for further evaluation for encephaopathy.   - Significant events at outside hospital includes fluctuating level of consciousness / encephalopathic state with multifactorial etiologies HTN urgency / ischemic stroke (watershed infarcts) / ? NCSE / infections (possible UTI source / undone meningitis work up).     - examination reveals improved level of awareness / consciousness. However with attention / concentration / recent memory affect, with UMN findings correlating with watershed infarcts.   - MRI Brain wo contrast outside negative for space occupying lesions     - DDx Dementia  complex from AIDS/HIV complex vs encephalitis from infectious vs toxic vs metabolic vs space occupying lesions     Plan:   - MRI Brain w wo contrast for evaluations of mass / contrast enhancing lesions / sub-acute encephalitis / meningeal enhancements  - Consult ID for recs / HIV management    - Serum studies (if not done outside):   ---- HSV / VZV / CMV / WNV / cryptococcal / toxoplasmosis / syphilis / TB / utox / vasculitis (ANCA) / autoimmune encephalitis / culutres bacteria / fungal   - Perform LP studies   ---- cell count / diff / glucose / protein / cultures / gram stain   ---- HSV / VZV / CMV / WNV / Enterovirus / Arbo cryptococcal / toxoplasmosis / syphilis / TB / utox / vasculitis (ANCA) / autoimmune encephalitis / culutres bacteria / fungal     - Stroke work-up (more likely watershed / hypoperfusion with underlying chronic HTN changes)  ---- MRI brain w wo contrast / MRA head / neck   ---- TTE to rule out source / cardiac monitoring / A1c, lipid panel   ---- ASA / statins if not contraindicated   ---- Vascular neurology consult      - EEG rotuine post MRI for eval of epileptiform activitty   - seizure / delirium precautions / avoid metabolic abnormalities / nephro consult for HD / avoid hypoxias / hypercapnia            Seizure    - continue keppra  - EEG ROTUINE         HTN (hypertension)    - as per primary         AIDS    - as per primary / ID            VTE Risk Mitigation         Ordered     Place sequential compression device  Until discontinued      09/23/17 2023     Medium Risk of VTE  Once      09/23/17 2023          Thank you for your consult. I will follow-up with patient. Please contact us if you have any additional questions.    Thiago Holt MD  Neurology  [facility]

## 2017-09-24 NOTE — HPI
53 yearold male with AIDS (CD 49 not on ART or PPx), ESRD on HD (M,W,F), and seizure disorder presents as a transfer from [facility] for Neurology eval and LP.  Patient presented to OSH on 9/15 from a dialysis center for HTN.  He was admitted for HTN urgency and routine workup revealed thrombocytopenia of 19,000.  His hospital course was complicated by AMS with lethargy.  MRI revealed multiple areas concerning for ischemic stroke/ seizure.  Neurology initially felt his AMS and resulting seizure was 2/2 to stroke and started him on Keppra, ASA and stain.  His mental status initially improved however he was found to be HTN with a fever and spent a brief time in the ICU.  Patient was started on ceftriaxone and Vanc given concern for UTI.  After stepping down to the floor patient was once again found to be lethargic and confused.  Neurology wished to pursue LP but due to plantlet count of 52,000 they felt uncomfortable due to lack of Neurosurgery.  On evaluation patient is alert and oriented x 4.  He answers most questions appropriately but does appear to be forgetful with possible confabulation.  He reports he is doing great and attributes his confusion due to seizure with he reports improved on Keppra.  He otherwise reports he feels in his normal state of health and is anxious regarding LP.  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  He lives in [place] with his wife but moved to [place] for work.  He was presenting for HD and felt great prior to admission to the hospital.  He reports a past medical history of HIV first diagnosed 4 years ago in addition to ESRD due to poor HTN control.  Reports tunnel cath placed in May, however OSH records unclear if changed.  Reports he is able to attend to all of his ADLs independently but does not some fine motor  "skill defect in his bilateral hands attributed to a neck injury "years" ago.  Regarding his HIV he reports he was on therapy but does not remember what or when he stopped taking.  Is unaware of he was on Abx ppx.  His wife is aware of his HIV and he reports silvina it during a sexual encounter.  He is a never smoker, social drinker who denies drug use.                    "

## 2017-09-24 NOTE — PROGRESS NOTES
Quick neuro assessment demonstrates pt. Has delayed responses to some commands.  He says he is tires and has not slept.  Pt. Was unable to symmetrically hold up both hands or supinate right arm/ hand.   strengths +2 bilaterally.  Pt. BP is 177/115.  IM5 has been paged.  Awaiting further instruction.  Assessments/ vitals per flow sheet.  Will continue to monitor.    When giving morning meds pt. Became confused.  He kept trying to drink from an imaginary cup in his hand and took several seconds to realize I was trying to give him the cup.

## 2017-09-25 ENCOUNTER — ANESTHESIA EVENT (OUTPATIENT)
Dept: NEUROLOGY | Facility: HOSPITAL | Age: 52
DRG: 974 | End: 2017-09-25
Payer: MEDICARE

## 2017-09-25 ENCOUNTER — ANESTHESIA (OUTPATIENT)
Dept: NEUROLOGY | Facility: HOSPITAL | Age: 52
DRG: 974 | End: 2017-09-25
Payer: MEDICARE

## 2017-09-25 LAB
ABO + RH BLD: NORMAL
ALBUMIN SERPL BCP-MCNC: 2 G/DL
ALBUMIN SERPL BCP-MCNC: 2 G/DL
ALLENS TEST: ABNORMAL
ALP SERPL-CCNC: 112 U/L
ALT SERPL W/O P-5'-P-CCNC: 49 U/L
AMMONIA PLAS-SCNC: 21 UMOL/L
ANION GAP SERPL CALC-SCNC: 16 MMOL/L
ANION GAP SERPL CALC-SCNC: 17 MMOL/L
ANISOCYTOSIS BLD QL SMEAR: SLIGHT
ANISOCYTOSIS BLD QL SMEAR: SLIGHT
APTT BLDCRRT: 28.8 SEC
AST SERPL-CCNC: 87 U/L
BASOPHILS # BLD AUTO: ABNORMAL K/UL
BASOPHILS # BLD AUTO: ABNORMAL K/UL
BASOPHILS NFR BLD: 0 %
BASOPHILS NFR BLD: 0 %
BILIRUB SERPL-MCNC: 0.5 MG/DL
BLD GP AB SCN CELLS X3 SERPL QL: NORMAL
BUN SERPL-MCNC: 46 MG/DL
BUN SERPL-MCNC: 53 MG/DL
CALCIUM SERPL-MCNC: 7.8 MG/DL
CALCIUM SERPL-MCNC: 8 MG/DL
CD3+CD4+ CELLS # BLD: 50 CELLS/UL (ref 300–1400)
CD3+CD4+ CELLS NFR BLD: 18.3 % (ref 28–57)
CHLORIDE SERPL-SCNC: 95 MMOL/L
CHLORIDE SERPL-SCNC: 98 MMOL/L
CHOLEST SERPL-MCNC: 133 MG/DL
CHOLEST/HDLC SERPL: 5.3 {RATIO}
CO2 SERPL-SCNC: 23 MMOL/L
CO2 SERPL-SCNC: 25 MMOL/L
CREAT SERPL-MCNC: 12.1 MG/DL
CREAT SERPL-MCNC: 13.3 MG/DL
DELSYS: ABNORMAL
DIASTOLIC DYSFUNCTION: NO
DIFFERENTIAL METHOD: ABNORMAL
DIFFERENTIAL METHOD: ABNORMAL
EOSINOPHIL # BLD AUTO: ABNORMAL K/UL
EOSINOPHIL # BLD AUTO: ABNORMAL K/UL
EOSINOPHIL NFR BLD: 0 %
EOSINOPHIL NFR BLD: 1 %
ERYTHROCYTE [DISTWIDTH] IN BLOOD BY AUTOMATED COUNT: 15.7 %
ERYTHROCYTE [DISTWIDTH] IN BLOOD BY AUTOMATED COUNT: 16.1 %
ERYTHROCYTE [SEDIMENTATION RATE] IN BLOOD BY WESTERGREN METHOD: 14 MM/H
EST. GFR  (AFRICAN AMERICAN): 4.3 ML/MIN/1.73 M^2
EST. GFR  (AFRICAN AMERICAN): 4.9 ML/MIN/1.73 M^2
EST. GFR  (NON AFRICAN AMERICAN): 3.7 ML/MIN/1.73 M^2
EST. GFR  (NON AFRICAN AMERICAN): 4.2 ML/MIN/1.73 M^2
ESTIMATED AVG GLUCOSE: 97 MG/DL
ESTIMATED PA SYSTOLIC PRESSURE: 26.04
FIO2: 100
FOLATE SERPL-MCNC: 11.6 NG/ML
GLUCOSE SERPL-MCNC: 77 MG/DL
GLUCOSE SERPL-MCNC: 91 MG/DL
HBA1C MFR BLD HPLC: 5 %
HCO3 UR-SCNC: 30 MMOL/L (ref 24–28)
HCT VFR BLD AUTO: 27.8 %
HCT VFR BLD AUTO: 28.7 %
HDLC SERPL-MCNC: 25 MG/DL
HDLC SERPL: 18.8 %
HGB BLD-MCNC: 9.1 G/DL
HGB BLD-MCNC: 9.5 G/DL
LDH SERPL L TO P-CCNC: 475 U/L
LDLC SERPL CALC-MCNC: 71 MG/DL
LYMPHOCYTES # BLD AUTO: ABNORMAL K/UL
LYMPHOCYTES # BLD AUTO: ABNORMAL K/UL
LYMPHOCYTES NFR BLD: 10 %
LYMPHOCYTES NFR BLD: 14 %
MCH RBC QN AUTO: 31 PG
MCH RBC QN AUTO: 31.1 PG
MCHC RBC AUTO-ENTMCNC: 32.7 G/DL
MCHC RBC AUTO-ENTMCNC: 33.1 G/DL
MCV RBC AUTO: 94 FL
MCV RBC AUTO: 95 FL
METAMYELOCYTES NFR BLD MANUAL: 2 %
MIN VOL: 6.4
MITRAL VALVE MOBILITY: NORMAL
MODE: ABNORMAL
MONOCYTES # BLD AUTO: ABNORMAL K/UL
MONOCYTES # BLD AUTO: ABNORMAL K/UL
MONOCYTES NFR BLD: 21 %
MONOCYTES NFR BLD: 5 %
NEUTROPHILS NFR BLD: 63 %
NEUTROPHILS NFR BLD: 75 %
NEUTS BAND NFR BLD MANUAL: 3 %
NEUTS BAND NFR BLD MANUAL: 6 %
NONHDLC SERPL-MCNC: 108 MG/DL
OVALOCYTES BLD QL SMEAR: ABNORMAL
OVALOCYTES BLD QL SMEAR: ABNORMAL
PCO2 BLDA: 46.9 MMHG (ref 35–45)
PEEP: 5
PH SMN: 7.41 [PH] (ref 7.35–7.45)
PHOSPHATE SERPL-MCNC: 6.8 MG/DL
PIP: 17
PLATELET # BLD AUTO: 58 K/UL
PLATELET # BLD AUTO: 68 K/UL
PMV BLD AUTO: ABNORMAL FL
PMV BLD AUTO: ABNORMAL FL
PO2 BLDA: 619 MMHG (ref 80–100)
POC BE: 5 MMOL/L
POC SATURATED O2: 100 % (ref 95–100)
POC TCO2: 31 MMOL/L (ref 23–27)
POCT GLUCOSE: 86 MG/DL (ref 70–110)
POCT GLUCOSE: 90 MG/DL (ref 70–110)
POIKILOCYTOSIS BLD QL SMEAR: SLIGHT
POIKILOCYTOSIS BLD QL SMEAR: SLIGHT
POLYCHROMASIA BLD QL SMEAR: ABNORMAL
POLYCHROMASIA BLD QL SMEAR: ABNORMAL
POTASSIUM SERPL-SCNC: 4.2 MMOL/L
POTASSIUM SERPL-SCNC: 4.8 MMOL/L
PROT SERPL-MCNC: 7.7 G/DL
RBC # BLD AUTO: 2.94 M/UL
RBC # BLD AUTO: 3.05 M/UL
RETIRED EF AND QEF - SEE NOTES: 55 (ref 55–65)
RPR SER QL: NORMAL
SAMPLE: ABNORMAL
SITE: ABNORMAL
SODIUM SERPL-SCNC: 137 MMOL/L
SODIUM SERPL-SCNC: 137 MMOL/L
SP02: 100
T4 FREE SERPL-MCNC: 0.9 NG/DL
TRICUSPID VALVE REGURGITATION: NORMAL
TRIGL SERPL-MCNC: 185 MG/DL
TSH SERPL DL<=0.005 MIU/L-ACNC: 4.75 UIU/ML
VIT B12 SERPL-MCNC: 1121 PG/ML
VT: 450
WBC # BLD AUTO: 4.07 K/UL
WBC # BLD AUTO: 5.36 K/UL

## 2017-09-25 PROCEDURE — 84439 ASSAY OF FREE THYROXINE: CPT

## 2017-09-25 PROCEDURE — 31720 CLEARANCE OF AIRWAYS: CPT

## 2017-09-25 PROCEDURE — 86850 RBC ANTIBODY SCREEN: CPT

## 2017-09-25 PROCEDURE — 94002 VENT MGMT INPAT INIT DAY: CPT

## 2017-09-25 PROCEDURE — 95951 HC EEG MONITORING/VIDEO RECORD: CPT

## 2017-09-25 PROCEDURE — 95957 EEG DIGITAL ANALYSIS: CPT

## 2017-09-25 PROCEDURE — 86359 T CELLS TOTAL COUNT: CPT

## 2017-09-25 PROCEDURE — 86789 WEST NILE VIRUS ANTIBODY: CPT

## 2017-09-25 PROCEDURE — 83615 LACTATE (LD) (LDH) ENZYME: CPT

## 2017-09-25 PROCEDURE — 86787 VARICELLA-ZOSTER ANTIBODY: CPT

## 2017-09-25 PROCEDURE — 80053 COMPREHEN METABOLIC PANEL: CPT

## 2017-09-25 PROCEDURE — 0BH18EZ INSERTION OF ENDOTRACHEAL AIRWAY INTO TRACHEA, VIA NATURAL OR ARTIFICIAL OPENING ENDOSCOPIC: ICD-10-PCS | Performed by: INTERNAL MEDICINE

## 2017-09-25 PROCEDURE — 99223 1ST HOSP IP/OBS HIGH 75: CPT | Mod: ,,, | Performed by: PSYCHIATRY & NEUROLOGY

## 2017-09-25 PROCEDURE — 93306 TTE W/DOPPLER COMPLETE: CPT

## 2017-09-25 PROCEDURE — 83036 HEMOGLOBIN GLYCOSYLATED A1C: CPT

## 2017-09-25 PROCEDURE — 86695 HERPES SIMPLEX TYPE 1 TEST: CPT

## 2017-09-25 PROCEDURE — 86900 BLOOD TYPING SEROLOGIC ABO: CPT

## 2017-09-25 PROCEDURE — 80061 LIPID PANEL: CPT

## 2017-09-25 PROCEDURE — 99900035 HC TECH TIME PER 15 MIN (STAT)

## 2017-09-25 PROCEDURE — 84443 ASSAY THYROID STIM HORMONE: CPT

## 2017-09-25 PROCEDURE — 93306 TTE W/DOPPLER COMPLETE: CPT | Mod: 26,,, | Performed by: INTERNAL MEDICINE

## 2017-09-25 PROCEDURE — 63600175 PHARM REV CODE 636 W HCPCS: Performed by: STUDENT IN AN ORGANIZED HEALTH CARE EDUCATION/TRAINING PROGRAM

## 2017-09-25 PROCEDURE — 85007 BL SMEAR W/DIFF WBC COUNT: CPT

## 2017-09-25 PROCEDURE — 87536 HIV-1 QUANT&REVRSE TRNSCRPJ: CPT

## 2017-09-25 PROCEDURE — 86790 VIRUS ANTIBODY NOS: CPT | Mod: 91

## 2017-09-25 PROCEDURE — 82140 ASSAY OF AMMONIA: CPT

## 2017-09-25 PROCEDURE — 36415 COLL VENOUS BLD VENIPUNCTURE: CPT

## 2017-09-25 PROCEDURE — 5A1945Z RESPIRATORY VENTILATION, 24-96 CONSECUTIVE HOURS: ICD-10-PCS | Performed by: INTERNAL MEDICINE

## 2017-09-25 PROCEDURE — 99291 CRITICAL CARE FIRST HOUR: CPT | Mod: GC,,, | Performed by: PSYCHIATRY & NEUROLOGY

## 2017-09-25 PROCEDURE — 25000003 PHARM REV CODE 250: Performed by: PHYSICIAN ASSISTANT

## 2017-09-25 PROCEDURE — 86255 FLUORESCENT ANTIBODY SCREEN: CPT

## 2017-09-25 PROCEDURE — 82746 ASSAY OF FOLIC ACID SERUM: CPT

## 2017-09-25 PROCEDURE — 93010 ELECTROCARDIOGRAM REPORT: CPT | Mod: ,,, | Performed by: INTERNAL MEDICINE

## 2017-09-25 PROCEDURE — 86778 TOXOPLASMA ANTIBODY IGM: CPT

## 2017-09-25 PROCEDURE — 80069 RENAL FUNCTION PANEL: CPT

## 2017-09-25 PROCEDURE — 36600 WITHDRAWAL OF ARTERIAL BLOOD: CPT

## 2017-09-25 PROCEDURE — 82803 BLOOD GASES ANY COMBINATION: CPT

## 2017-09-25 PROCEDURE — 86360 T CELL ABSOLUTE COUNT/RATIO: CPT

## 2017-09-25 PROCEDURE — 20000000 HC ICU ROOM

## 2017-09-25 PROCEDURE — 85730 THROMBOPLASTIN TIME PARTIAL: CPT

## 2017-09-25 PROCEDURE — 85027 COMPLETE CBC AUTOMATED: CPT

## 2017-09-25 PROCEDURE — 63600175 PHARM REV CODE 636 W HCPCS: Performed by: PHYSICIAN ASSISTANT

## 2017-09-25 PROCEDURE — 82607 VITAMIN B-12: CPT

## 2017-09-25 PROCEDURE — 27200966 HC CLOSED SUCTION SYSTEM

## 2017-09-25 PROCEDURE — 93005 ELECTROCARDIOGRAM TRACING: CPT

## 2017-09-25 PROCEDURE — 99223 1ST HOSP IP/OBS HIGH 75: CPT | Mod: GC,,, | Performed by: INTERNAL MEDICINE

## 2017-09-25 PROCEDURE — 86641 CRYPTOCOCCUS ANTIBODY: CPT

## 2017-09-25 PROCEDURE — 95951 PR EEG MONITORING/VIDEORECORD: CPT | Mod: 26,,, | Performed by: PSYCHIATRY & NEUROLOGY

## 2017-09-25 PROCEDURE — 25000003 PHARM REV CODE 250: Performed by: STUDENT IN AN ORGANIZED HEALTH CARE EDUCATION/TRAINING PROGRAM

## 2017-09-25 PROCEDURE — 27000221 HC OXYGEN, UP TO 24 HOURS

## 2017-09-25 RX ORDER — LEVETIRACETAM 500 MG/1
500 TABLET ORAL DAILY
Status: DISCONTINUED | OUTPATIENT
Start: 2017-09-25 | End: 2017-09-25

## 2017-09-25 RX ORDER — HYDRALAZINE HYDROCHLORIDE 50 MG/1
100 TABLET, FILM COATED ORAL EVERY 8 HOURS
Status: DISCONTINUED | OUTPATIENT
Start: 2017-09-25 | End: 2017-10-03

## 2017-09-25 RX ORDER — DAPSONE 25 MG/1
50 TABLET ORAL DAILY
Status: DISCONTINUED | OUTPATIENT
Start: 2017-09-26 | End: 2017-09-29

## 2017-09-25 RX ORDER — ATORVASTATIN CALCIUM 10 MG/1
10 TABLET, FILM COATED ORAL NIGHTLY
Status: DISCONTINUED | OUTPATIENT
Start: 2017-09-25 | End: 2017-10-03

## 2017-09-25 RX ORDER — PHENYLEPHRINE HCL IN 0.9% NACL 1 MG/10 ML
SYRINGE (ML) INTRAVENOUS
Status: DISPENSED
Start: 2017-09-25 | End: 2017-09-25

## 2017-09-25 RX ORDER — PROPOFOL 10 MG/ML
100 VIAL (ML) INTRAVENOUS ONCE
Status: COMPLETED | OUTPATIENT
Start: 2017-09-25 | End: 2017-09-25

## 2017-09-25 RX ORDER — ROCURONIUM BROMIDE 10 MG/ML
INJECTION, SOLUTION INTRAVENOUS
Status: DISCONTINUED
Start: 2017-09-25 | End: 2017-09-25 | Stop reason: WASHOUT

## 2017-09-25 RX ORDER — AZITHROMYCIN 600 MG/1
1200 TABLET, FILM COATED ORAL WEEKLY
Status: DISCONTINUED | OUTPATIENT
Start: 2017-09-26 | End: 2017-09-26

## 2017-09-25 RX ORDER — SODIUM CHLORIDE 9 MG/ML
INJECTION, SOLUTION INTRAVENOUS CONTINUOUS
Status: DISCONTINUED | OUTPATIENT
Start: 2017-09-25 | End: 2017-09-27

## 2017-09-25 RX ORDER — PROPOFOL 10 MG/ML
INJECTION, EMULSION INTRAVENOUS
Status: DISCONTINUED
Start: 2017-09-25 | End: 2017-09-25 | Stop reason: WASHOUT

## 2017-09-25 RX ORDER — ETOMIDATE 2 MG/ML
INJECTION INTRAVENOUS
Status: DISCONTINUED
Start: 2017-09-25 | End: 2017-09-25 | Stop reason: WASHOUT

## 2017-09-25 RX ORDER — SUCCINYLCHOLINE CHLORIDE 20 MG/ML
120 INJECTION INTRAMUSCULAR; INTRAVENOUS ONCE
Status: COMPLETED | OUTPATIENT
Start: 2017-09-25 | End: 2017-09-25

## 2017-09-25 RX ORDER — AMLODIPINE BESYLATE 5 MG/1
5 TABLET ORAL DAILY
Status: DISCONTINUED | OUTPATIENT
Start: 2017-09-26 | End: 2017-10-03

## 2017-09-25 RX ORDER — LABETALOL HYDROCHLORIDE 5 MG/ML
10 INJECTION, SOLUTION INTRAVENOUS ONCE
Status: COMPLETED | OUTPATIENT
Start: 2017-09-25 | End: 2017-09-25

## 2017-09-25 RX ORDER — LEUCOVORIN CALCIUM 25 MG/1
25 TABLET ORAL WEEKLY
Status: DISCONTINUED | OUTPATIENT
Start: 2017-09-26 | End: 2017-09-29

## 2017-09-25 RX ORDER — HYDRALAZINE HYDROCHLORIDE 20 MG/ML
10 INJECTION INTRAMUSCULAR; INTRAVENOUS EVERY 4 HOURS PRN
Status: DISCONTINUED | OUTPATIENT
Start: 2017-09-25 | End: 2017-10-03

## 2017-09-25 RX ORDER — LABETALOL HYDROCHLORIDE 5 MG/ML
10 INJECTION, SOLUTION INTRAVENOUS EVERY 4 HOURS PRN
Status: DISCONTINUED | OUTPATIENT
Start: 2017-09-25 | End: 2017-10-03

## 2017-09-25 RX ORDER — SUCCINYLCHOLINE CHLORIDE 20 MG/ML
INJECTION INTRAMUSCULAR; INTRAVENOUS
Status: DISCONTINUED
Start: 2017-09-25 | End: 2017-09-25 | Stop reason: WASHOUT

## 2017-09-25 RX ORDER — LEVETIRACETAM 500 MG/1
500 TABLET ORAL DAILY
Status: DISCONTINUED | OUTPATIENT
Start: 2017-09-26 | End: 2017-09-27

## 2017-09-25 RX ORDER — LORAZEPAM 2 MG/ML
2 INJECTION INTRAMUSCULAR ONCE AS NEEDED
Status: COMPLETED | OUTPATIENT
Start: 2017-09-25 | End: 2017-09-25

## 2017-09-25 RX ORDER — PYRIMETHAMINE 25 MG/1
50 TABLET ORAL WEEKLY
Status: DISCONTINUED | OUTPATIENT
Start: 2017-09-26 | End: 2017-09-29

## 2017-09-25 RX ADMIN — LABETALOL HYDROCHLORIDE 10 MG: 5 INJECTION, SOLUTION INTRAVENOUS at 10:09

## 2017-09-25 RX ADMIN — SUCCINYLCHOLINE CHLORIDE 120 MG: 20 INJECTION, SOLUTION INTRAMUSCULAR; INTRAVENOUS at 11:09

## 2017-09-25 RX ADMIN — ACYCLOVIR SODIUM 710 MG: 1000 INJECTION, SOLUTION INTRAVENOUS at 07:09

## 2017-09-25 RX ADMIN — PROPOFOL 110 MG: 10 INJECTION, EMULSION INTRAVENOUS at 11:09

## 2017-09-25 RX ADMIN — DAPSONE 50 MG: 25 TABLET ORAL at 10:09

## 2017-09-25 RX ADMIN — HYDRALAZINE HYDROCHLORIDE 10 MG: 20 INJECTION INTRAMUSCULAR; INTRAVENOUS at 02:09

## 2017-09-25 RX ADMIN — SODIUM CHLORIDE: 0.9 INJECTION, SOLUTION INTRAVENOUS at 01:09

## 2017-09-25 RX ADMIN — LORAZEPAM 2 MG: 2 INJECTION INTRAMUSCULAR; INTRAVENOUS at 12:09

## 2017-09-25 RX ADMIN — ATORVASTATIN CALCIUM 10 MG: 10 TABLET, FILM COATED ORAL at 10:09

## 2017-09-25 RX ADMIN — LABETALOL HYDROCHLORIDE 10 MG: 5 INJECTION, SOLUTION INTRAVENOUS at 02:09

## 2017-09-25 RX ADMIN — HYDRALAZINE HYDROCHLORIDE 100 MG: 50 TABLET ORAL at 10:09

## 2017-09-25 NOTE — ASSESSMENT & PLAN NOTE
Underlying etiology uncertain, ?HIV nephropathy, reportedly last received session on Friday (but we're waiting for more records), either way, appears quite dry on exam, likely 7-8kgs below his EDW, lytes are fine, acid/base is fine, no significant azotemia and his altered mental status is not from uremia, but more likely secondary CVA/?embolic vs other infection/?developing sepsis.    Plan:  1) he is on a MWF schedule, however no need to run today and especially considering his current severe encephalopathy I would be very concern for his ability to protect his airway and being able to provide a safe dialysis session in the DIDI, we're awaiting the primary team to evaluate and he may be transferred to the ICU and if mental status and ability to protect airway does not improve, may need to consider elective intubation  2) monitor his condition throughout the day, if his mental status and ability to protect airway improves, then would evaluate to run in DIDI in the morning, otherwise will be in the ICU

## 2017-09-25 NOTE — NURSING
Notified IM 5 of patient's unresponsiveness. Pt's BP is elevated and unable to swallow PO meds at this time. Pt was due for Hydralazine 100 mg. Pt was able to swallow crushed meds at the beginning of shift. BG was checked and is 86. Pt presents with eyes open, will blink if object is moved. Pt will yawn, but is not currently following commands. Will continue to monitor.

## 2017-09-25 NOTE — SUBJECTIVE & OBJECTIVE
Past Medical History:   Diagnosis Date    ESRD on hemodialysis     HIV (human immunodeficiency virus infection)     Seizures        History reviewed. No pertinent surgical history.    Review of patient's allergies indicates:  No Known Allergies    Medications:  Prescriptions Prior to Admission   Medication Sig    cloNIDine (CATAPRES) 0.1 MG tablet Take 0.1 mg by mouth 2 (two) times daily.    hydrALAZINE (APRESOLINE) 50 MG tablet Take 50 mg by mouth every 6 (six) hours.     lamivudine-zidovudine 150-300mg (COMBIVIR) 150-300 mg Tab Take 1 tablet by mouth every 12 (twelve) hours.    levetiracetam (KEPPRA) 1000 MG tablet Take 500 mg by mouth every Mon, Wed, Fri. Take after dialysis.     Antibiotics     Start     Stop Route Frequency Ordered    09/26/17 0900  azithromycin tablet 1,200 mg      -- Oral Weekly 09/24/17 1425    09/24/17 1515  dapsone tablet 50 mg      -- Oral Daily 09/24/17 1403    09/23/17 2345  cefTRIAXone (ROCEPHIN) 2 g in dextrose 5 % 50 mL IVPB      -- IV Every 24 hours (non-standard times) 09/23/17 2238        Antifungals     None        Antivirals     None             There is no immunization history on file for this patient.    Family History     Family history is unknown by patient.        Social History     Social History    Marital status:      Spouse name: N/A    Number of children: N/A    Years of education: N/A     Social History Main Topics    Smoking status: Never Smoker    Smokeless tobacco: Never Used    Alcohol use No    Drug use: No    Sexual activity: Yes     Partners: Female     Other Topics Concern    None     Social History Narrative    None     Review of Systems   Unable to perform ROS: Intubated     Objective:     Vital Signs (Most Recent):  Temp: 97.5 °F (36.4 °C) (09/25/17 1500)  Pulse: 87 (09/25/17 1600)  Resp: 14 (09/25/17 1600)  BP: (!) 129/96 (09/25/17 1600)  SpO2: 100 % (09/25/17 1600) Vital Signs (24h Range):  Temp:  [97.3 °F (36.3 °C)-98.9 °F (37.2  °C)] 97.5 °F (36.4 °C)  Pulse:  [] 87  Resp:  [14-26] 14  SpO2:  [96 %-100 %] 100 %  BP: (102-175)/() 129/96     Weight: 70.6 kg (155 lb 9.6 oz)  Body mass index is 21.1 kg/m².    Estimated Creatinine Clearance: 6.4 mL/min (based on SCr of 13.3 mg/dL (H)).    Physical Exam   Constitutional: He appears well-developed and well-nourished.   Eyes: Pupils are equal, round, and reactive to light.   Neck: Neck supple.   Cardiovascular: Normal rate, regular rhythm and normal heart sounds.  Exam reveals no friction rub.    No murmur heard.  Pulmonary/Chest: Effort normal and breath sounds normal. He has no wheezes. He has no rales.   Abdominal: Soft. Bowel sounds are normal. He exhibits no distension. There is no tenderness. There is no guarding.   Musculoskeletal: He exhibits no edema or deformity.   Neurological:   GCS8   Skin: Skin is dry. No rash noted. No erythema.       Significant Labs:   Blood Culture:   Recent Labs  Lab 09/23/17 2039 09/23/17  2122 09/24/17  0013   LABBLOO No Growth to date  No Growth to date No Growth to date  No Growth to date No Growth to date  No Growth to date     BMP:   Recent Labs  Lab 09/25/17  1514   GLU 91      K 4.8   CL 98   CO2 23   BUN 53*   CREATININE 13.3*   CALCIUM 7.8*     C4 Count: No results for input(s): C4 in the last 48 hours.  CBC:   Recent Labs  Lab 09/24/17  0351 09/25/17  0346 09/25/17  1432   WBC 3.41* 4.07 5.36   HGB 9.0* 9.5* 9.1*   HCT 27.7* 28.7* 27.8*   PLT 57* 68* 58*       Significant Imaging: I have reviewed all pertinent imaging results/findings within the past 24 hours.

## 2017-09-25 NOTE — ASSESSMENT & PLAN NOTE
Case of 53 y/o male with PMHX of HIV currently AIDS not treated at this time with no follow up in out patient to our knowledge. Patient CD4 count from OSH 49. Patient in need for PCP and Toxoplasma and MAC prophylaxis. Would recommend to adjust coverage and discontinue dapsone, leucovorine and pyrimethamine and start Bactrim DS to cover PCP and Toxoplasma prophylaxis. Can continue azythromycin for MAC coverage. HAART not indicated until we determine status of current lethargic state and rule out infectious cuases. Begin HAART can cause an aggressive immune response also known as IRIS. It can also worcen HIV encephalopathy and PML that is possible etiology as suggested by MRI results. Would recommend to order hepatitis panel to determine if coinfected. Aditional STI already ordered will follow results.    - will follow RPR, CD4, HIV RNA quant,   - Recommend Hepatis Panel   - Will recommend to discontinue leucovorine, dapsone and pyrimethamine  - Recommend to start Bactrim DS once daily PO for PCP and toxo prophylaxis   - Continue Azythromycin for MAC prophylaxis

## 2017-09-25 NOTE — CONSULTS
[facility]  Nephrology  Consult Note    Patient Name: Volodymyr Pope  MRN: [id number]  Admission Date: 9/23/2017  Hospital Length of Stay: 2 days  Attending Provider: Enma Song MD   Primary Care Physician: Primary Doctor No  Principal Problem:Acute encephalopathy    Inpatient consult to Nephrology  Consult performed by: BRENNEN NINO  Consult ordered by: MONROE GRACIA  Reason for consult: ESRD        Subjective:     HPI: 53 yearold male with AIDS (CD 49 not on ART or PPx), ESRD on HD (M,W,F), and seizure disorder presents as a transfer from [facility] for Neurology eval and LP.  Patient presented to OSH on 9/15 from a dialysis center for HTN.  He was admitted for HTN urgency and routine workup revealed thrombocytopenia of 19,000.  His hospital course was complicated by AMS with lethargy.  MRI revealed multiple areas concerning for embolic strokes with seziure activity.  Neurology initially felt his AMS and resulting seizure was 2/2 to stroke and started him on Keppra, ASA and stain.  His mental status initially improved however he was found to be HTN with a fever and spent a brief time in the ICU.  Patient was started on ceftriaxone and Vanc given concern for UTI.  After stepping down to the floor patient was once again found to be lethargic and confused.  Neurology wished to pursue LP but due to platelet count of 52,000 they felt uncomfortable due to lack of Neurosurgery and this is the reason for transfer to [facility].  Other PMH significant for poorly controlled HTN and HIV/?AIDS with uncertain compliance.  Patient reportedly initially was Aox4, but when I see him he is not alert, not answering questions, arousable, but barely.     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    Prior to hospital admit in [place] he was receiving HD at [facility] in [place] on MWF through a R sided tunneled access line (reportedly w/o access problems and access placed in May), his nephrologist in [place] is Dr Hammond, treatment duration 4hrs, EDW: 78 kg, Normal UF 1-2 L, he does have residual renal function, uncertain how much, although noted he has made about 600ccs of urine overnight.    He reportedly received dialysis this past Friday, his lytes, acid/base and volume status are stable this morning, he is actually net negative 400ccs overnight, and appears to be below his EDW.      Past Medical History:   Diagnosis Date    ESRD on hemodialysis     HIV (human immunodeficiency virus infection)     Seizures        History reviewed. No pertinent surgical history.    Review of patient's allergies indicates:  No Known Allergies  Current Facility-Administered Medications   Medication Frequency    amlodipine tablet 5 mg Daily    atorvastatin tablet 10 mg QHS    [START ON 9/26/2017] azithromycin tablet 1,200 mg Weekly    cefTRIAXone (ROCEPHIN) 2 g in dextrose 5 % 50 mL IVPB Q24H    dapsone tablet 50 mg Daily    hydrALAZINE tablet 100 mg Q8H    labetalol injection 10 mg Once    [START ON 9/26/2017] leucovorin tablet 25 mg Weekly    levetiracetam tablet 500 mg Daily    [START ON 9/26/2017] pyrimethamine tablet 50 mg Weekly     Family History     Family history is unknown by patient.        Social History Main Topics    Smoking status: Never Smoker    Smokeless tobacco: Never Used    Alcohol use No    Drug use: No    Sexual activity: Yes     Partners: Female     Review of Systems   Unable to perform ROS: Mental status change     Objective:     Vital Signs (Most Recent):  Temp: 98.5 °F (36.9 °C) (09/25/17 0822)  Pulse: 106 (09/25/17 0822)  Resp: 17  (09/25/17 0822)  BP: (!) 172/118 (09/25/17 0822)  SpO2: 96 % (09/25/17 0822)  O2 Device (Oxygen Therapy): room air (09/25/17 0822) Vital Signs (24h Range):  Temp:  [97.5 °F (36.4 °C)-98.9 °F (37.2 °C)] 98.5 °F (36.9 °C)  Pulse:  [] 106  Resp:  [16-18] 17  SpO2:  [96 %-99 %] 96 %  BP: (142-190)/() 172/118     Weight: 70.6 kg (155 lb 9.6 oz) (09/23/17 1957)  Body mass index is 21.1 kg/m².  Body surface area is 1.89 meters squared.    I/O last 3 completed shifts:  In: 680 [P.O.:680]  Out: 600 [Urine:600]    Physical Exam   HENT:   Head: Atraumatic.   Neck: No JVD present.   Cardiovascular: Normal rate and regular rhythm.  Exam reveals no friction rub.    Pulmonary/Chest: Effort normal and breath sounds normal.   Abdominal: Soft. He exhibits no distension.   Musculoskeletal: He exhibits no edema.   Neurological:   arousable but not alert   Skin: Skin is warm.       Significant Labs:  CBC:   Recent Labs  Lab 09/25/17  0346   WBC 4.07   RBC 3.05*   HGB 9.5*   HCT 28.7*   PLT 68*   MCV 94   MCH 31.1*   MCHC 33.1     CMP:   Recent Labs  Lab 09/23/17 2039 09/25/17  0346   GLU 82  < > 77   CALCIUM 7.4*  < > 8.0*   ALBUMIN 2.0*  < > 2.0*   PROT 8.0  --   --      < > 137   K 3.9  < > 4.2   CO2 28  < > 25   CL 96  < > 95   BUN 35*  < > 46*   CREATININE 9.4*  < > 12.1*   ALKPHOS 101  --   --    ALT 53*  --   --    *  --   --    BILITOT 0.4  --   --    < > = values in this interval not displayed.  All labs within the past 24 hours have been reviewed.      Assessment/Plan:     ESRD (end stage renal disease)    Underlying etiology uncertain, ?HIV nephropathy, reportedly last received session on Friday (but we're waiting for more records), either way, appears quite dry on exam, likely 7-8kgs below his EDW, lytes are fine, acid/base is fine, no significant azotemia and his altered mental status is not from uremia, but more likely secondary CVA/?embolic vs other infection/?developing sepsis.    Plan:  1) he  is on a MWF schedule, however no need to run today and especially considering his current severe encephalopathy I would be very concern for his ability to protect his airway and being able to provide a safe dialysis session in the DIDI, we're awaiting the primary team to evaluate and he may be transferred to the ICU and if mental status and ability to protect airway does not improve, may need to consider elective intubation  2) monitor his condition throughout the day, if his mental status and ability to protect airway improves, then would evaluate to run in DIDI in the morning, otherwise will be in the ICU            Thank you for your consult. I will follow-up with patient. Please contact us if you have any additional questions.    Félix Gomez MD  Nephrology  [facility]

## 2017-09-25 NOTE — SUBJECTIVE & OBJECTIVE
Past Medical History:   Diagnosis Date    ESRD on hemodialysis     HIV (human immunodeficiency virus infection)     Seizures        History reviewed. No pertinent surgical history.    Review of patient's allergies indicates:  No Known Allergies  Current Facility-Administered Medications   Medication Frequency    amlodipine tablet 5 mg Daily    atorvastatin tablet 10 mg QHS    [START ON 9/26/2017] azithromycin tablet 1,200 mg Weekly    cefTRIAXone (ROCEPHIN) 2 g in dextrose 5 % 50 mL IVPB Q24H    dapsone tablet 50 mg Daily    hydrALAZINE tablet 100 mg Q8H    labetalol injection 10 mg Once    [START ON 9/26/2017] leucovorin tablet 25 mg Weekly    levetiracetam tablet 500 mg Daily    [START ON 9/26/2017] pyrimethamine tablet 50 mg Weekly     Family History     Family history is unknown by patient.        Social History Main Topics    Smoking status: Never Smoker    Smokeless tobacco: Never Used    Alcohol use No    Drug use: No    Sexual activity: Yes     Partners: Female     Review of Systems   Unable to perform ROS: Mental status change     Objective:     Vital Signs (Most Recent):  Temp: 98.5 °F (36.9 °C) (09/25/17 0822)  Pulse: 106 (09/25/17 0822)  Resp: 17 (09/25/17 0822)  BP: (!) 172/118 (09/25/17 0822)  SpO2: 96 % (09/25/17 0822)  O2 Device (Oxygen Therapy): room air (09/25/17 0822) Vital Signs (24h Range):  Temp:  [97.5 °F (36.4 °C)-98.9 °F (37.2 °C)] 98.5 °F (36.9 °C)  Pulse:  [] 106  Resp:  [16-18] 17  SpO2:  [96 %-99 %] 96 %  BP: (142-190)/() 172/118     Weight: 70.6 kg (155 lb 9.6 oz) (09/23/17 1957)  Body mass index is 21.1 kg/m².  Body surface area is 1.89 meters squared.    I/O last 3 completed shifts:  In: 680 [P.O.:680]  Out: 600 [Urine:600]    Physical Exam   HENT:   Head: Atraumatic.   Neck: No JVD present.   Cardiovascular: Normal rate and regular rhythm.  Exam reveals no friction rub.    Pulmonary/Chest: Effort normal and breath sounds normal.   Abdominal: Soft. He  exhibits no distension.   Musculoskeletal: He exhibits no edema.   Neurological:   arousable but not alert   Skin: Skin is warm.       Significant Labs:  CBC:   Recent Labs  Lab 09/25/17  0346   WBC 4.07   RBC 3.05*   HGB 9.5*   HCT 28.7*   PLT 68*   MCV 94   MCH 31.1*   MCHC 33.1     CMP:   Recent Labs  Lab 09/23/17 2039 09/25/17  0346   GLU 82  < > 77   CALCIUM 7.4*  < > 8.0*   ALBUMIN 2.0*  < > 2.0*   PROT 8.0  --   --      < > 137   K 3.9  < > 4.2   CO2 28  < > 25   CL 96  < > 95   BUN 35*  < > 46*   CREATININE 9.4*  < > 12.1*   ALKPHOS 101  --   --    ALT 53*  --   --    *  --   --    BILITOT 0.4  --   --    < > = values in this interval not displayed.  All labs within the past 24 hours have been reviewed.

## 2017-09-25 NOTE — HPI
53 y/o AAM whom we were asked to see as a Stroke Code due to depressed level of consciousness.  He was transferred to [facility] from [facility] and seen by the general neurology service yesterday.  At that time, he was encephalopathic with multifactorial potential etiologies.  Per consult note, outside MRI was negative for acute pathology.  Repeat MRI (with contrast), EEG and LP planned.  Patient found this AM with L gaze preference and unresponsive.  When initial stroke code team arrived, there was LUE flexion noted.  There is a history of seizures (pt on Keppra) and AIDS.

## 2017-09-25 NOTE — SIGNIFICANT EVENT
Patient transferred from [facility] to [facility] 6091 as rapid response for AMS. On arrival to unit, patient was emergently intubated by Dr. Leon with direct laryngoscopy at 1129. He was given Propofol and Succinylcholine IVP prior to intubation. ET tube placement confirmed by positive color change to EtCO2 detector and equal bilateral breath sounds on auscultation. Patient was placed on transport vent and cardiac monitor and taken to CT and MRI of head with RN x 2 and respiratory therapist at 1152.

## 2017-09-25 NOTE — HPI
Case of 53 y/o male PMHx AIDS not on HAART, ESRD on HD and seizures. Transferred from OSH on 9/23/17 for management of altered mental status.  He was in the neuro ICU for some time.  Extensive work up was negative.  He has continued to improve.  His mental status is now close to his baseline per his mother who is here.  We are re-consulted as he had some low grade temperatures.

## 2017-09-25 NOTE — PHARMACY MED REC
"Admission Medication Reconciliation - Pharmacy Consult Note    The home medication history was taken by Fe Andre, Pharmacy Technician.  Based on information gathered and subsequent review by the clinical pharmacist, the items below may need attention.     You may go to "Admission" then "Reconcile Home Medications" tabs to review and/or act upon these items. Based on information gathered and subsequent review by the clinical pharmacist, the items below may need attention.    Potentially problematic discrepancies with current MAR  o Patient IS taking the following which was not ordered upon admit  o Clonidine 0.1 mg tablet, take 0.1mg by mouth two times daily  o Lamivudine-zidovudine 150-300mg (COMBIVIR) tablet, Take 1 tablet by mouth every 12 (twelve) hours.    o Patient is taking a drug DIFFERENTLY than how ordered upon admit  o Hydralazine 50mg tablet, Take 1 tablet by mouth four times daily.  o Levetiracetam (KEPPRA) 500mg tablet. Take 500mg Mondays, Wednesdays, and Fridays after dialysis.  - Supplemental dose OK after hemodialysis, however patients usually still receive 500mg to 1g every 24 hours    Patient's prior to admission medication regimen was as follows:  Medication Sig    cloNIDine (CATAPRES) 0.1 MG tablet Take 0.1 mg by mouth 2 (two) times daily.    hydrALAZINE (APRESOLINE) 50 MG tablet Take 50 mg by mouth every 6 (six) hours.     lamivudine-zidovudine 150-300mg (COMBIVIR) 150-300 mg Tab Take 1 tablet by mouth every 12 (twelve) hours.    levetiracetam (KEPPRA) 1000 MG tablet Take 500 mg by mouth every Mon, Wed, Fri. Take after dialysis.     Please address this information as you see fit.  Feel free to contact us if you have any questions or require assistance.    Noe Alejandra, PharmD  PGY-1 Pharmacy Resident  Spectralink: [phone]   Please insert appropriate  SmartPhrase below:        "

## 2017-09-25 NOTE — SIGNIFICANT EVENT
Critical Care Outreach (CORE)  Critical Care Medicine  Consult Note      CORE Metrics:     Admit Date: 2017  LOS: 2  Code Status: Full Code   CC: AMS  Date of Consult: 2017  : 1965  Age: 52 y.o.  Weight:   Wt Readings from Last 1 Encounters:   17 70.6 kg (155 lb 9.6 oz)     Sex: male  Bed: [facility] A:   MRN: [id number]  RRT Indication(s): AMS  Time CORE Call Received: 11:15  Time CORE at Bedside: 11:21  Was the patient discharged from an ICU this admission? N  Was the patient discharged from a PACU within last 24 hours? N  Did the patient receive conscious sedation/general anesthesia within last 24 hours? N  Was the patient in the ED within the past 24 hours? N  Was the patient started on NIPPV within the past 24 hours? N  Did this progress into an ARC or CPA: Emergent intubation in ICU  Attending Physician: Enma Song MD  Primary Service: [facility]  Illness Category:  Neurologic  Consult Requested By: Enma Song MD   Disposition: To [facility]    Patient has AMS since earlier in am. Primary team had consulted NCC. A stroke code and a RRS was initiated simultaneously. Patient was already in route to [facility] when I arrived.    Fiona Winterbottom APRN, CNS  Critical Care Medicine  [phone]

## 2017-09-25 NOTE — CONSULTS
[facility]  Vascular Neurology  Comprehensive Stroke Center  Consult Note    Consults: Stroke code for L gaze preference and unresponsiveness    Assessment/Plan:     Patient is a 52 y.o. year old male with:    * Acute encephalopathy    Several days of encephalopathy with deterioration today with L gaze preference and LUE tonic flexion.  Most suspicious of seizures (R frontal focus).  Ischemic stroke(s) in differential as is encephalitis.  Not a tPA candidate as last known well several days ago.    Rec:  Stat EEG monitoring  MRI/MRA brain and MRA neck  Consider LP pending above  We will provide additional recommendations after MRI            Thrombolysis Candidate? No  1. Contraindications: Outside of treament window    Interventional Revascularization Candidate?  No; No ischemic penumbra    Research Candidate? No-->     Subjective:     History of Present Illness:  53 y/o AAM whom we were asked to see as a Stroke Code due to depressed level of consciousness.  He was transferred to [facility] from [facility] and seen by the general neurology service yesterday.  At that time, he was encephalopathic with multifactorial potential etiologies.  Per consult note, outside MRI was negative for acute pathology.  Repeat MRI (with contrast), EEG and LP planned.  Patient found this AM with L gaze preference and unresponsive.  When initial stroke code team arrived, there was LUE flexion noted.  There is a history of seizures (pt on Keppra) and AIDS.          Past Medical History:   Diagnosis Date    ESRD on hemodialysis     HIV (human immunodeficiency virus infection)     Seizures      History reviewed. No pertinent surgical history.  Family History   Problem Relation Age of Onset    Family history unknown: Yes     Social History   Substance Use Topics    Smoking status: Never Smoker    Smokeless tobacco: Never Used    Alcohol use No     Review of patient's allergies indicates:  No Known Allergies  Medications: I  have reviewed the current medication administration record.    Prescriptions Prior to Admission   Medication Sig Dispense Refill Last Dose    cloNIDine (CATAPRES) 0.1 MG tablet Take 0.1 mg by mouth 2 (two) times daily.       hydrALAZINE (APRESOLINE) 50 MG tablet Take 50 mg by mouth every 6 (six) hours.        lamivudine-zidovudine 150-300mg (COMBIVIR) 150-300 mg Tab Take 1 tablet by mouth every 12 (twelve) hours.       levetiracetam (KEPPRA) 1000 MG tablet Take 500 mg by mouth every Mon, Wed, Fri. Take after dialysis.          Review of Systems   Unable to perform ROS: Patient unresponsive     Objective:     Vital Signs (Most Recent):  Temp: 98.5 °F (36.9 °C) (09/25/17 0822)  Pulse: 106 (09/25/17 0822)  Resp: 17 (09/25/17 0822)  BP: (!) 140/100 (09/25/17 1046)  SpO2: 96 % (09/25/17 0822)    Vital Signs Range (Last 24H):  Temp:  [97.5 °F (36.4 °C)-98.9 °F (37.2 °C)]   Pulse:  []   Resp:  [16-18]   BP: (140-190)/()   SpO2:  [96 %-99 %]     Physical Exam    Neurological Exam:   LOC: does not follow requests  Language: No aphasia, Mute  Speech: No dysarthria, Mute  Orientation: Person, Place, Time, Mute  EOM (CN III, IV, VI): Gaze preference left  Patient paralyzed/intubated during my exam.      Stroke Scales  Laboratory:  CMP:   Recent Labs  Lab 09/25/17  0346   CALCIUM 8.0*   ALBUMIN 2.0*      K 4.2   CO2 25   CL 95   BUN 46*   CREATININE 12.1*     CBC:   Recent Labs  Lab 09/25/17  0346   WBC 4.07   RBC 3.05*   HGB 9.5*   HCT 28.7*   PLT 68*   MCV 94   MCH 31.1*   MCHC 33.1     Coagulation: No results for input(s): INR, APTT in the last 168 hours.    Invalid input(s): PT  Hgb A1C:   Recent Labs  Lab 09/25/17  0601   HGBA1C 5.0     TSH:   Recent Labs  Lab 09/23/17 2039   TSH 4.206*       Diagnostic Results:  CT brain without - no acute pathology; age indeterminate white matter lucencies      Petey Cooper MD  [facility]  Department of Vascular Neurology   [facility]  [place]

## 2017-09-25 NOTE — CODE DOCUMENTATION
Nursing Transfer Note      9/25/2017     Transfer To: [id number] from [id number]    Transfer via bed    Transfer with O2, cardiac monitoring    Transported by RN and NCC team    Medicines sent: yes    Chart send with patient: Yes    Notified:

## 2017-09-25 NOTE — ASSESSMENT & PLAN NOTE
Patient with worsening lethargy and encephalopathy since admission. MRI and clinical course not suggestive of bacterial meningitis, would suggest to discontinue ceftriaxone. MRI with multifocal gyral diffusion restriction and T2/FLAIR hyperintensity involving the bilateral frontal and parietal lobes (particularly the left frontal eye field). Result suggestive of HIV encephalopathy/HIV associated infection, seizure activity, or multifocal infarcts. Also considered PML with out CSF samples to evaluate difficult to determine if infectious cause. MRI with no enhancing lesions or mass effect suggestive of cryptococcus with also negative antigen result or toxoplasmosis. If PML or HIV encephalopathy supportive is management. Cidofovir only recommended on patient that had been on HAART prior to PML.     - Will recommend CSF culture,WBC, RBC, glucose, protein if able to obtain  - will follow Toxoplasma IgG, ANCA, [place] serology, CMV and RPR

## 2017-09-25 NOTE — ANESTHESIA PROCEDURE NOTES
Intubation    Diagnosis: Encephalopathy   Patient location during procedure: ICU  Procedure start time: 9/25/2017 11:31 AM  Timeout: 9/25/2017 11:30 AM  Procedure end time: 9/25/2017 11:35 AM  Staffing  Resident/CRNA: ANUSHA HARRIS  Performed: resident/CRNA   Preanesthetic Checklist  Completed: patient identified, site marked, surgical consent, pre-op evaluation, timeout performed, IV checked, risks and benefits discussed, monitors and equipment checked and anesthesia consent given  Intubation  Indication: airway protection  Pre-oxygenation. Induction: intravenous rapid sequence, mask ventilation: n/a.  Intubation: postinduction, laryngoscopy direct, Soniya 3.  Endotracheal Tube: oral, 8.0 mm ID, cuffed (inflated to minimal occlusive pressure)  Attempts: 1, Grade I - full view of cords  Complicating Factors: none  Tube secured at 24 cm at the lips.  Findings post-intubation: bilateral breath sounds, positive ETCO2, atraumatic / condition of teeth unchanged  Position Confirmation: auscultation

## 2017-09-25 NOTE — CONSULTS
Consult received. Full note to follow.    Please call for questions.    Thanks,  Suraj Whaley MD  Infectious Disease Fellow, PGY-4  Pager: [phone]  [facility]
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
## 2017-09-25 NOTE — ASSESSMENT & PLAN NOTE
Several days of encephalopathy with deterioration today with L gaze preference and LUE tonic flexion.  Most suspicious of seizures (R frontal focus).  Ischemic stroke(s) in differential as is encephalitis.  Not a tPA candidate as last known well several days ago.    Rec:  Stat EEG monitoring  MRI/MRA brain and MRA neck  Consider LP pending above  We will provide additional recommendations after MRI

## 2017-09-25 NOTE — SUBJECTIVE & OBJECTIVE
Past Medical History:   Diagnosis Date    ESRD on hemodialysis     HIV (human immunodeficiency virus infection)     Seizures      History reviewed. No pertinent surgical history.  Family History   Problem Relation Age of Onset    Family history unknown: Yes     Social History   Substance Use Topics    Smoking status: Never Smoker    Smokeless tobacco: Never Used    Alcohol use No     Review of patient's allergies indicates:  No Known Allergies  Medications: I have reviewed the current medication administration record.    Prescriptions Prior to Admission   Medication Sig Dispense Refill Last Dose    cloNIDine (CATAPRES) 0.1 MG tablet Take 0.1 mg by mouth 2 (two) times daily.       hydrALAZINE (APRESOLINE) 50 MG tablet Take 50 mg by mouth every 6 (six) hours.        lamivudine-zidovudine 150-300mg (COMBIVIR) 150-300 mg Tab Take 1 tablet by mouth every 12 (twelve) hours.       levetiracetam (KEPPRA) 1000 MG tablet Take 500 mg by mouth every Mon, Wed, Fri. Take after dialysis.          Review of Systems   Unable to perform ROS: Patient unresponsive     Objective:     Vital Signs (Most Recent):  Temp: 98.5 °F (36.9 °C) (09/25/17 0822)  Pulse: 106 (09/25/17 0822)  Resp: 17 (09/25/17 0822)  BP: (!) 140/100 (09/25/17 1046)  SpO2: 96 % (09/25/17 0822)    Vital Signs Range (Last 24H):  Temp:  [97.5 °F (36.4 °C)-98.9 °F (37.2 °C)]   Pulse:  []   Resp:  [16-18]   BP: (140-190)/()   SpO2:  [96 %-99 %]     Physical Exam    Neurological Exam:   LOC: does not follow requests  Language: No aphasia, Mute  Speech: No dysarthria, Mute  Orientation: Person, Place, Time, Mute  EOM (CN III, IV, VI): Gaze preference left  Patient paralyzed/intubated during my exam.      Stroke Scales  Laboratory:  CMP:   Recent Labs  Lab 09/25/17 0346   CALCIUM 8.0*   ALBUMIN 2.0*      K 4.2   CO2 25   CL 95   BUN 46*   CREATININE 12.1*     CBC:   Recent Labs  Lab 09/25/17 0346   WBC 4.07   RBC 3.05*   HGB 9.5*   HCT  28.7*   PLT 68*   MCV 94   MCH 31.1*   MCHC 33.1     Coagulation: No results for input(s): INR, APTT in the last 168 hours.    Invalid input(s): PT  Hgb A1C:   Recent Labs  Lab 09/25/17  0601   HGBA1C 5.0     TSH:   Recent Labs  Lab 09/23/17 2039   TSH 4.206*       Diagnostic Results:  CT brain without - no acute pathology; age indeterminate white matter lucencies

## 2017-09-25 NOTE — PLAN OF CARE
Problem: Patient Care Overview  Goal: Individualization & Mutuality  Outcome: Ongoing (interventions implemented as appropriate)  POC reviewed with pt and pt's mother, Kae, via telephone at 1400. Pt unable to verbalize understanding due to ETT.  MRI and CT completed.  EEG monitoring in progress.  Questions and concerns addressed.  Will continue to monitor. See flowsheets for full assessment and VS info.

## 2017-09-25 NOTE — HPI
53 yearold male with AIDS (CD 49 not on ART or PPx), ESRD on HD (M,W,F), and seizure disorder presents as a transfer from [facility] for Neurology eval and LP.  Patient presented to OSH on 9/15 from a dialysis center for HTN.  He was admitted for HTN urgency and routine workup revealed thrombocytopenia of 19,000.  His hospital course was complicated by AMS with lethargy.  MRI revealed multiple areas concerning for embolic strokes with seziure activity.  Neurology initially felt his AMS and resulting seizure was 2/2 to stroke and started him on Keppra, ASA and stain.  His mental status initially improved however he was found to be HTN with a fever and spent a brief time in the ICU.  Patient was started on ceftriaxone and Vanc given concern for UTI.  After stepping down to the floor patient was once again found to be lethargic and confused.  Neurology wished to pursue LP but due to platelet count of 52,000 they felt uncomfortable due to lack of Neurosurgery and this is the reason for transfer to [facility].  Other PMH significant for poorly controlled HTN and HIV/?AIDS with uncertain compliance.  Patient reportedly initially was Aox4, but when I see him he is not alert, not answering questions, arousable, but barely.     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    Prior to hospital admit in [place] he was receiving HD at [facility] in [place] on MWF through a R sided tunneled access line (reportedly w/o access problems and access placed in May), his nephrologist in [place] is Dr Hammond, treatment duration 4hrs, EDW: 78 kg, Normal UF 1-2 L, he does have residual  renal function, uncertain how much, although noted he has made about 600ccs of urine overnight.    He reportedly received dialysis this past Friday, his lytes, acid/base and volume status are stable this morning, he is actually net negative 400ccs overnight, and appears to be below his EDW.

## 2017-09-25 NOTE — ASSESSMENT & PLAN NOTE
Case of 53 y/o male with PMHX of HIV currently AIDS not treated at this time with no follow up in out patient to our knowledge. Patient CD4 count from OSH 49. CD4 count on 9/23 18.4. Which requires PCP and Toxoplasma and MAC prophylaxis.     - Would recommend to adjust coverage:  discontinue dapsone, leucovorine and pyrimethamine and start Bactrim DS once daily PO to cover PCP and Toxoplasma prophylaxis. - ceftriaxone was changed to meropenem (high dose) for broad spectrum and CNS coverage  - HAART not indicated until we determine status of current lethargic state and rule out infectious causes. Begin HAART can cause an aggressive immune response also known as IRIS. It can also worcen HIV encephalopathy and PML that is possible etiology as suggested by MRI results.   - Would recommend to order hepatitis panel to determine if coinfected. Aditional STI already ordered will follow results.  - RPR (9/23) nonreactive, HIV RNA quant (9/26) pending.  - recommend Azythromycin for MAC prophylaxis   - Previously recommended Hepatitis Panel, LP, Histoplasmosis urine ag, CT chest/abdo  - EBV PCR; CMV PCR; FTA, JCV IgG all pending  - will consider recommending amphotericin depending on LP result

## 2017-09-25 NOTE — PLAN OF CARE
Problem: Patient Care Overview  Goal: Plan of Care Review  Outcome: Ongoing (interventions implemented as appropriate)  Pt observed lying in bed with eyes open, no s/s of distress noted. Pt responses to verbal stimuli at times, with delayed responses. Pt is confused and unaware of surroundings. PERRLA intact, but pt noted staring off. Pt follows repeated commands and pin light movement. BP elevated, medicated as ordered with good results. Will continue monitor.

## 2017-09-25 NOTE — NURSING
Pt /118, pt very lethargic arouses to pain only, IM notified, no distress noted, will continue to monitor.

## 2017-09-26 LAB
ALBUMIN SERPL BCP-MCNC: 1.7 G/DL
ALBUMIN SERPL BCP-MCNC: 1.8 G/DL
ALLENS TEST: ABNORMAL
ANCA AB TITR SER IF: NORMAL TITER
ANION GAP SERPL CALC-SCNC: 16 MMOL/L
ANION GAP SERPL CALC-SCNC: 9 MMOL/L
ANISOCYTOSIS BLD QL SMEAR: SLIGHT
APTT BLDCRRT: 29.1 SEC
BASOPHILS # BLD AUTO: 0.01 K/UL
BASOPHILS # BLD AUTO: 0.01 K/UL
BASOPHILS NFR BLD: 0.2 %
BASOPHILS NFR BLD: 0.2 %
BLD PROD TYP BPU: NORMAL
BLOOD UNIT EXPIRATION DATE: NORMAL
BLOOD UNIT TYPE CODE: 6200
BLOOD UNIT TYPE: NORMAL
BUN SERPL-MCNC: 28 MG/DL
BUN SERPL-MCNC: 56 MG/DL
CALCIUM SERPL-MCNC: 7.6 MG/DL
CALCIUM SERPL-MCNC: 7.7 MG/DL
CHLORIDE SERPL-SCNC: 100 MMOL/L
CHLORIDE SERPL-SCNC: 98 MMOL/L
CLARITY CSF: CLEAR
CLARITY CSF: CLEAR
CMV IGG SERPL QL IA: REACTIVE
CO2 SERPL-SCNC: 21 MMOL/L
CO2 SERPL-SCNC: 28 MMOL/L
CODING SYSTEM: NORMAL
COLOR CSF: COLORLESS
COLOR CSF: COLORLESS
CREAT SERPL-MCNC: 13.5 MG/DL
CREAT SERPL-MCNC: 6.8 MG/DL
DELSYS: ABNORMAL
DIFFERENTIAL METHOD: ABNORMAL
DIFFERENTIAL METHOD: ABNORMAL
DISPENSE STATUS: NORMAL
EOSINOPHIL # BLD AUTO: 0 K/UL
EOSINOPHIL # BLD AUTO: 0 K/UL
EOSINOPHIL NFR BLD: 0 %
EOSINOPHIL NFR BLD: 0.2 %
ERYTHROCYTE [DISTWIDTH] IN BLOOD BY AUTOMATED COUNT: 16.1 %
ERYTHROCYTE [DISTWIDTH] IN BLOOD BY AUTOMATED COUNT: 17.9 %
ERYTHROCYTE [SEDIMENTATION RATE] IN BLOOD BY WESTERGREN METHOD: 14 MM/H
EST. GFR  (AFRICAN AMERICAN): 4.3 ML/MIN/1.73 M^2
EST. GFR  (AFRICAN AMERICAN): 9.7 ML/MIN/1.73 M^2
EST. GFR  (NON AFRICAN AMERICAN): 3.7 ML/MIN/1.73 M^2
EST. GFR  (NON AFRICAN AMERICAN): 8.4 ML/MIN/1.73 M^2
FIO2: 50
GLUCOSE CSF-MCNC: 55 MG/DL
GLUCOSE SERPL-MCNC: 77 MG/DL
GLUCOSE SERPL-MCNC: 95 MG/DL
HCO3 UR-SCNC: 28 MMOL/L (ref 24–28)
HCT VFR BLD AUTO: 23.5 %
HCT VFR BLD AUTO: 25.1 %
HGB BLD-MCNC: 7.7 G/DL
HGB BLD-MCNC: 8.3 G/DL
HSV1 IGG SERPL QL IA: NEGATIVE
HSV2 IGG SERPL QL IA: POSITIVE
HYPOCHROMIA BLD QL SMEAR: ABNORMAL
INR PPP: 1
LYMPHOCYTES # BLD AUTO: 0.3 K/UL
LYMPHOCYTES # BLD AUTO: 0.4 K/UL
LYMPHOCYTES NFR BLD: 4.7 %
LYMPHOCYTES NFR BLD: 9.3 %
LYMPHOCYTES NFR CSF MANUAL: 100 %
LYMPHOCYTES NFR CSF MANUAL: 100 %
MAGNESIUM SERPL-MCNC: 1.7 MG/DL
MAGNESIUM SERPL-MCNC: 2 MG/DL
MCH RBC QN AUTO: 31.2 PG
MCH RBC QN AUTO: 31.3 PG
MCHC RBC AUTO-ENTMCNC: 32.8 G/DL
MCHC RBC AUTO-ENTMCNC: 33.1 G/DL
MCV RBC AUTO: 95 FL
MCV RBC AUTO: 95 FL
MIN VOL: 6.69
MODE: ABNORMAL
MONOCYTES # BLD AUTO: 0.4 K/UL
MONOCYTES # BLD AUTO: 0.4 K/UL
MONOCYTES NFR BLD: 6.6 %
MONOCYTES NFR BLD: 8.1 %
NEUTROPHILS # BLD AUTO: 3.6 K/UL
NEUTROPHILS # BLD AUTO: 4.9 K/UL
NEUTROPHILS NFR BLD: 82.4 %
NEUTROPHILS NFR BLD: 88.3 %
OVALOCYTES BLD QL SMEAR: ABNORMAL
P-ANCA TITR SER IF: NORMAL TITER
PCO2 BLDA: 43.6 MMHG (ref 35–45)
PEEP: 5
PH SMN: 7.42 [PH] (ref 7.35–7.45)
PHOSPHATE SERPL-MCNC: 4.1 MG/DL
PHOSPHATE SERPL-MCNC: 8.7 MG/DL
PHOSPHATE SERPL-MCNC: 8.7 MG/DL
PLATELET # BLD AUTO: 131 K/UL
PLATELET # BLD AUTO: 48 K/UL
PLATELET BLD QL SMEAR: ABNORMAL
PLATELET BLD QL SMEAR: ABNORMAL
PMV BLD AUTO: ABNORMAL FL
PMV BLD AUTO: ABNORMAL FL
PO2 BLDA: 414 MMHG (ref 80–100)
POC BE: 3 MMOL/L
POC SATURATED O2: 100 % (ref 95–100)
POC TCO2: 29 MMOL/L (ref 23–27)
POIKILOCYTOSIS BLD QL SMEAR: SLIGHT
POLYCHROMASIA BLD QL SMEAR: ABNORMAL
POTASSIUM SERPL-SCNC: 4 MMOL/L
POTASSIUM SERPL-SCNC: 4.5 MMOL/L
PROT CSF-MCNC: 65 MG/DL
PROTHROMBIN TIME: 10.5 SEC
RBC # BLD AUTO: 2.47 M/UL
RBC # BLD AUTO: 2.65 M/UL
RBC # CSF: 0 /CU MM
RBC # CSF: 0 /CU MM
SAMPLE: ABNORMAL
SITE: ABNORMAL
SODIUM SERPL-SCNC: 135 MMOL/L
SODIUM SERPL-SCNC: 137 MMOL/L
SP02: 100
SPECIMEN VOL CSF: 4 ML
SPECIMEN VOL CSF: 6 ML
T GONDII IGM SER-ACNC: <3 AU/ML
T PALLIDUM AB SER QL IF: ABNORMAL
TRANS PLATPHERESIS VOL PATIENT: NORMAL ML
VT: 450
WBC # BLD AUTO: 4.42 K/UL
WBC # BLD AUTO: 5.57 K/UL
WBC # CSF: 1 /CU MM
WBC # CSF: 1 /CU MM

## 2017-09-26 PROCEDURE — 85730 THROMBOPLASTIN TIME PARTIAL: CPT

## 2017-09-26 PROCEDURE — 87799 DETECT AGENT NOS DNA QUANT: CPT | Mod: 91

## 2017-09-26 PROCEDURE — 84157 ASSAY OF PROTEIN OTHER: CPT

## 2017-09-26 PROCEDURE — 87070 CULTURE OTHR SPECIMN AEROBIC: CPT

## 2017-09-26 PROCEDURE — 94761 N-INVAS EAR/PLS OXIMETRY MLT: CPT

## 2017-09-26 PROCEDURE — 25000003 PHARM REV CODE 250: Performed by: PHYSICIAN ASSISTANT

## 2017-09-26 PROCEDURE — 87798 DETECT AGENT NOS DNA AMP: CPT | Mod: 91

## 2017-09-26 PROCEDURE — 27200966 HC CLOSED SUCTION SYSTEM

## 2017-09-26 PROCEDURE — P9035 PLATELET PHERES LEUKOREDUCED: HCPCS

## 2017-09-26 PROCEDURE — 20000000 HC ICU ROOM

## 2017-09-26 PROCEDURE — 85610 PROTHROMBIN TIME: CPT

## 2017-09-26 PROCEDURE — 009U3ZX DRAINAGE OF SPINAL CANAL, PERCUTANEOUS APPROACH, DIAGNOSTIC: ICD-10-PCS | Performed by: PSYCHIATRY & NEUROLOGY

## 2017-09-26 PROCEDURE — 83735 ASSAY OF MAGNESIUM: CPT | Mod: 91

## 2017-09-26 PROCEDURE — 63600175 PHARM REV CODE 636 W HCPCS: Performed by: NURSE PRACTITIONER

## 2017-09-26 PROCEDURE — 99233 SBSQ HOSP IP/OBS HIGH 50: CPT | Mod: ,,, | Performed by: INTERNAL MEDICINE

## 2017-09-26 PROCEDURE — 88108 CYTOPATH CONCENTRATE TECH: CPT | Performed by: PATHOLOGY

## 2017-09-26 PROCEDURE — 95951 PR EEG MONITORING/VIDEORECORD: CPT | Mod: 26,,, | Performed by: PSYCHIATRY & NEUROLOGY

## 2017-09-26 PROCEDURE — 87385 HISTOPLASMA CAPSUL AG IA: CPT

## 2017-09-26 PROCEDURE — 82945 GLUCOSE OTHER FLUID: CPT

## 2017-09-26 PROCEDURE — 87529 HSV DNA AMP PROBE: CPT

## 2017-09-26 PROCEDURE — 90945 DIALYSIS ONE EVALUATION: CPT

## 2017-09-26 PROCEDURE — 85025 COMPLETE CBC W/AUTO DIFF WBC: CPT

## 2017-09-26 PROCEDURE — 99291 CRITICAL CARE FIRST HOUR: CPT | Mod: GC,,, | Performed by: PSYCHIATRY & NEUROLOGY

## 2017-09-26 PROCEDURE — 63600175 PHARM REV CODE 636 W HCPCS: Performed by: INTERNAL MEDICINE

## 2017-09-26 PROCEDURE — 86403 PARTICLE AGGLUT ANTBDY SCRN: CPT

## 2017-09-26 PROCEDURE — 87116 MYCOBACTERIA CULTURE: CPT | Mod: 59

## 2017-09-26 PROCEDURE — 88108 CYTOPATH CONCENTRATE TECH: CPT | Mod: 26,,, | Performed by: PATHOLOGY

## 2017-09-26 PROCEDURE — 99233 SBSQ HOSP IP/OBS HIGH 50: CPT | Mod: GC,,, | Performed by: INTERNAL MEDICINE

## 2017-09-26 PROCEDURE — 95957 EEG DIGITAL ANALYSIS: CPT

## 2017-09-26 PROCEDURE — 36415 COLL VENOUS BLD VENIPUNCTURE: CPT

## 2017-09-26 PROCEDURE — 63600175 PHARM REV CODE 636 W HCPCS: Performed by: PSYCHIATRY & NEUROLOGY

## 2017-09-26 PROCEDURE — 62272 THER SPI PNXR DRG CSF: CPT

## 2017-09-26 PROCEDURE — 99900026 HC AIRWAY MAINTENANCE (STAT)

## 2017-09-26 PROCEDURE — 82803 BLOOD GASES ANY COMBINATION: CPT

## 2017-09-26 PROCEDURE — 86780 TREPONEMA PALLIDUM: CPT

## 2017-09-26 PROCEDURE — 87103 BLOOD FUNGUS CULTURE: CPT

## 2017-09-26 PROCEDURE — 63600175 PHARM REV CODE 636 W HCPCS: Performed by: PHYSICIAN ASSISTANT

## 2017-09-26 PROCEDURE — 94003 VENT MGMT INPAT SUBQ DAY: CPT

## 2017-09-26 PROCEDURE — 80069 RENAL FUNCTION PANEL: CPT | Mod: 91

## 2017-09-26 PROCEDURE — 89051 BODY FLUID CELL COUNT: CPT | Mod: 91

## 2017-09-26 PROCEDURE — 86644 CMV ANTIBODY: CPT

## 2017-09-26 PROCEDURE — 87116 MYCOBACTERIA CULTURE: CPT

## 2017-09-26 PROCEDURE — 25000003 PHARM REV CODE 250: Performed by: NURSE PRACTITIONER

## 2017-09-26 PROCEDURE — 27000221 HC OXYGEN, UP TO 24 HOURS

## 2017-09-26 PROCEDURE — 25000003 PHARM REV CODE 250: Performed by: PSYCHIATRY & NEUROLOGY

## 2017-09-26 PROCEDURE — 80069 RENAL FUNCTION PANEL: CPT

## 2017-09-26 PROCEDURE — 83605 ASSAY OF LACTIC ACID: CPT

## 2017-09-26 PROCEDURE — 99233 SBSQ HOSP IP/OBS HIGH 50: CPT | Mod: GC,,, | Performed by: PSYCHIATRY & NEUROLOGY

## 2017-09-26 PROCEDURE — 87799 DETECT AGENT NOS DNA QUANT: CPT

## 2017-09-26 PROCEDURE — 95951 HC EEG MONITORING/VIDEO RECORD: CPT

## 2017-09-26 PROCEDURE — 87205 SMEAR GRAM STAIN: CPT

## 2017-09-26 PROCEDURE — 36600 WITHDRAWAL OF ARTERIAL BLOOD: CPT

## 2017-09-26 PROCEDURE — 87798 DETECT AGENT NOS DNA AMP: CPT

## 2017-09-26 PROCEDURE — 36430 TRANSFUSION BLD/BLD COMPNT: CPT

## 2017-09-26 PROCEDURE — 83735 ASSAY OF MAGNESIUM: CPT

## 2017-09-26 RX ORDER — MEROPENEM AND SODIUM CHLORIDE 1 G/50ML
1 INJECTION, SOLUTION INTRAVENOUS
Status: DISCONTINUED | OUTPATIENT
Start: 2017-09-26 | End: 2017-09-30

## 2017-09-26 RX ORDER — FENTANYL CITRATE 50 UG/ML
100 INJECTION, SOLUTION INTRAMUSCULAR; INTRAVENOUS ONCE
Status: DISCONTINUED | OUTPATIENT
Start: 2017-09-26 | End: 2017-09-27

## 2017-09-26 RX ORDER — FLUCONAZOLE 2 MG/ML
400 INJECTION, SOLUTION INTRAVENOUS
Status: DISCONTINUED | OUTPATIENT
Start: 2017-09-26 | End: 2017-09-27

## 2017-09-26 RX ORDER — CHLORHEXIDINE GLUCONATE ORAL RINSE 1.2 MG/ML
15 SOLUTION DENTAL 2 TIMES DAILY
Status: DISCONTINUED | OUTPATIENT
Start: 2017-09-26 | End: 2017-10-02

## 2017-09-26 RX ORDER — FENTANYL CITRATE 50 UG/ML
INJECTION, SOLUTION INTRAMUSCULAR; INTRAVENOUS
Status: DISPENSED
Start: 2017-09-26 | End: 2017-09-27

## 2017-09-26 RX ORDER — FAMOTIDINE 20 MG/1
20 TABLET, FILM COATED ORAL DAILY
Status: DISCONTINUED | OUTPATIENT
Start: 2017-09-26 | End: 2017-10-02

## 2017-09-26 RX ORDER — FENTANYL CITRATE 50 UG/ML
50 INJECTION, SOLUTION INTRAMUSCULAR; INTRAVENOUS ONCE
Status: DISCONTINUED | OUTPATIENT
Start: 2017-09-26 | End: 2017-09-26

## 2017-09-26 RX ORDER — HYDROCODONE BITARTRATE AND ACETAMINOPHEN 500; 5 MG/1; MG/1
TABLET ORAL
Status: DISCONTINUED | OUTPATIENT
Start: 2017-09-26 | End: 2017-10-02

## 2017-09-26 RX ORDER — LIDOCAINE HYDROCHLORIDE 10 MG/ML
INJECTION INFILTRATION; PERINEURAL
Status: DISPENSED
Start: 2017-09-26 | End: 2017-09-27

## 2017-09-26 RX ORDER — MAGNESIUM SULFATE HEPTAHYDRATE 40 MG/ML
2 INJECTION, SOLUTION INTRAVENOUS
Status: DISCONTINUED | OUTPATIENT
Start: 2017-09-26 | End: 2017-09-27

## 2017-09-26 RX ORDER — HYDROCODONE BITARTRATE AND ACETAMINOPHEN 500; 5 MG/1; MG/1
TABLET ORAL
Status: DISCONTINUED | OUTPATIENT
Start: 2017-09-26 | End: 2017-09-27

## 2017-09-26 RX ADMIN — MEROPENEM AND SODIUM CHLORIDE 1 G: 1 INJECTION, SOLUTION INTRAVENOUS at 05:09

## 2017-09-26 RX ADMIN — CHLORHEXIDINE GLUCONATE 15 ML: 1.2 RINSE ORAL at 09:09

## 2017-09-26 RX ADMIN — HYDRALAZINE HYDROCHLORIDE 100 MG: 50 TABLET ORAL at 03:09

## 2017-09-26 RX ADMIN — PYRIMETHAMINE 50 MG: 25 TABLET ORAL at 08:09

## 2017-09-26 RX ADMIN — FAMOTIDINE 20 MG: 20 TABLET, FILM COATED ORAL at 10:09

## 2017-09-26 RX ADMIN — ACYCLOVIR SODIUM 710 MG: 1000 INJECTION, SOLUTION INTRAVENOUS at 03:09

## 2017-09-26 RX ADMIN — HYDRALAZINE HYDROCHLORIDE 100 MG: 50 TABLET ORAL at 05:09

## 2017-09-26 RX ADMIN — HYDRALAZINE HYDROCHLORIDE 100 MG: 50 TABLET ORAL at 09:09

## 2017-09-26 RX ADMIN — SODIUM CHLORIDE: 0.9 INJECTION, SOLUTION INTRAVENOUS at 05:09

## 2017-09-26 RX ADMIN — DAPSONE 50 MG: 25 TABLET ORAL at 08:09

## 2017-09-26 RX ADMIN — FLUCONAZOLE 400 MG: 2 INJECTION INTRAVENOUS at 11:09

## 2017-09-26 RX ADMIN — CEFTRIAXONE 2 G: 2 INJECTION, SOLUTION INTRAVENOUS at 12:09

## 2017-09-26 RX ADMIN — CHLORHEXIDINE GLUCONATE 15 ML: 1.2 RINSE ORAL at 10:09

## 2017-09-26 RX ADMIN — ATORVASTATIN CALCIUM 10 MG: 10 TABLET, FILM COATED ORAL at 09:09

## 2017-09-26 RX ADMIN — AMLODIPINE BESYLATE 5 MG: 5 TABLET ORAL at 08:09

## 2017-09-26 RX ADMIN — LEUCOVORIN CALCIUM 25 MG: 25 TABLET ORAL at 08:09

## 2017-09-26 RX ADMIN — LEVETIRACETAM 500 MG: 500 TABLET ORAL at 08:09

## 2017-09-26 NOTE — SUBJECTIVE & OBJECTIVE
Past Medical History:   Diagnosis Date    ESRD on hemodialysis     HIV (human immunodeficiency virus infection)     Seizures      History reviewed. No pertinent surgical history.  Family History   Problem Relation Age of Onset    Family history unknown: Yes     Social History   Substance Use Topics    Smoking status: Never Smoker    Smokeless tobacco: Never Used    Alcohol use No     Review of patient's allergies indicates:  No Known Allergies  Medications: I have reviewed the current medication administration record.    Prescriptions Prior to Admission   Medication Sig Dispense Refill Last Dose    cloNIDine (CATAPRES) 0.1 MG tablet Take 0.1 mg by mouth 2 (two) times daily.       hydrALAZINE (APRESOLINE) 50 MG tablet Take 50 mg by mouth every 6 (six) hours.        lamivudine-zidovudine 150-300mg (COMBIVIR) 150-300 mg Tab Take 1 tablet by mouth every 12 (twelve) hours.       levetiracetam (KEPPRA) 1000 MG tablet Take 500 mg by mouth every Mon, Wed, Fri. Take after dialysis.          Review of Systems   Unable to perform ROS: Patient unresponsive     Objective:     Vital Signs (Most Recent):  Temp: 97.2 °F (36.2 °C) (09/26/17 1052)  Pulse: 100 (09/26/17 1135)  Resp: 17 (09/26/17 1135)  BP: (!) 139/96 (09/26/17 1135)  SpO2: 100 % (09/26/17 1135)    Vital Signs Range (Last 24H):  Temp:  [97 °F (36.1 °C)-98.8 °F (37.1 °C)]   Pulse:  []   Resp:  [14-26]   BP: (102-175)/()   SpO2:  [97 %-100 %]     Physical Exam      Neurological Exam:   LOC: does not follow requests  Language: No aphasia, Mute  Speech: No dysarthria, Mute  Orientation: Person, Place, Time, Mute  EOM (CN III, IV, VI): Gaze preference left  Patient paralyzed/intubated during my exam.      Stroke Scales    Laboratory:  CMP:     Recent Labs  Lab 09/25/17  1514 09/26/17  0145   CALCIUM 7.8* 7.7*   ALBUMIN 2.0* 1.8*   PROT 7.7  --     135*   K 4.8 4.5   CO2 23 21*   CL 98 98   BUN 53* 56*   CREATININE 13.3* 13.5*   ALKPHOS 112   --    ALT 49*  --    AST 87*  --    BILITOT 0.5  --      CBC:     Recent Labs  Lab 09/26/17  0145   WBC 4.42   RBC 2.65*   HGB 8.3*   HCT 25.1*   PLT 48*   MCV 95   MCH 31.3*   MCHC 33.1     Coagulation:     Recent Labs  Lab 09/26/17  0145   INR 1.0   APTT 29.1     Hgb A1C:     Recent Labs  Lab 09/25/17  0601   HGBA1C 5.0     TSH:     Recent Labs  Lab 09/25/17  1514   TSH 4.752*       Diagnostic Results:  CT brain without - no acute pathology; age indeterminate white matter lucencies

## 2017-09-26 NOTE — SUBJECTIVE & OBJECTIVE
Interval History:  No acute adverse events overnight    Review of Systems  Unable to obtain a complete ROS due to level of consciousness.  Objective:     Vitals:  Temp: 97.2 °F (36.2 °C) (09/26/17 1052)  Pulse: 100 (09/26/17 1200)  Resp: 17 (09/26/17 1200)  BP: (!) 132/90 (09/26/17 1200)  SpO2: 100 % (09/26/17 1200)    Temp:  [97 °F (36.1 °C)-98.8 °F (37.1 °C)] 97.2 °F (36.2 °C)  Pulse:  [] 100  Resp:  [14-26] 17  SpO2:  [98 %-100 %] 100 %  BP: (113-175)/() 132/90         Vent Mode: A/C  Oxygen Concentration (%):  [] 50  Resp Rate Total:  [14 br/min-18 br/min] 16 br/min  Vt Set:  [450 mL] 450 mL  PEEP/CPAP:  [5 cmH20] 5 cmH20  Pressure Support:  [0 cmH20] 0 cmH20  Mean Airway Pressure:  [7.7 cmH20-9.4 cmH20] 8.7 cmH20    09/25 0701 - 09/26 0700  In: 2102.5 [I.V.:1282.5]  Out: 0     Physical Exam   Constitutional: He appears well-developed.   HENT:   Head: Normocephalic.   Eyes: Pupils are equal, round, and reactive to light.   Cardiovascular: Normal rate and regular rhythm.    Pulmonary/Chest:   Intubated   Abdominal: Soft.   Neurological: GCS eye subscore is 1. GCS verbal subscore is 1. GCS motor subscore is 2.   PERRL  Corneal intact BL  OC reflex intact  Gag/cough intact  Breathing over vent  Triple flexion BL LE to noxious stim  De-cerebrate posturing BL UE to noxious stim   Vitals reviewed.      Medications:  Continuous  sodium chloride 0.9% Last Rate: 75 mL/hr at 09/26/17 1200   Scheduled  [START ON 9/27/2017] acyclovir 10 mg/kg Daily   amlodipine 5 mg Daily   atorvastatin 10 mg QHS   chlorhexidine 15 mL BID   dapsone 50 mg Daily   famotidine 20 mg Daily   fluconazole (DIFLUCAN) IVPB 400 mg Q24H   hydrALAZINE 100 mg Q8H   leucovorin 25 mg Weekly   levetiracetam 500 mg Daily   meropenem (MERREM) IVPB 1 g Q12H   pyrimethamine 50 mg Weekly   PRN  sodium chloride  Q24H PRN   hydrALAZINE 10 mg Q4H PRN   labetalol 10 mg Q4H PRN       Pulse: 100 (09/26/17 1200)  Resp: 17 (09/26/17 1200)  BP: (!)  132/90 (09/26/17 1200)  SpO2: 100 % (09/26/17 1200)           Vent Mode: A/C  Oxygen Concentration (%):  [] 50  Resp Rate Total:  [14 br/min-18 br/min] 16 br/min  Vt Set:  [450 mL] 450 mL  PEEP/CPAP:  [5 cmH20] 5 cmH20  Pressure Support:  [0 cmH20] 0 cmH20  Mean Airway Pressure:  [7.7 cmH20-9.4 cmH20] 8.7 cmH20    09/25 0701 - 09/26 0700  In: 2102.5 [I.V.:1282.5]  Out: 0        3  09/25 0701 - 09/26 0700  In: 2102.5 [I.V.:1282.5]  Out: 0      Recent Labs  Lab 09/26/17  0415   PH 7.415   PCO2 43.6   PO2 414*   BE 3     Recent Labs  Lab 09/26/17  0145   *   K 4.5   CL 98   CO2 21*   BUN 56*   CREATININE 13.5*   CALCIUM 7.7*   MG 2.0   PHOS 8.7*  8.7*     Recent Labs  Lab 09/26/17  0145 09/26/17  1203   WBC 4.42 5.57   HGB 8.3* 7.7*   PLT 48*  --    INR 1.0  --    APTT 29.1  --      Recent Labs  Lab 09/25/17  1514 09/25/17  1649 09/26/17  0145   PROT 7.7  --   --    ALBUMIN 2.0*  --  1.8*   AST 87*  --   --    ALT 49*  --   --    LDH  --  475*  --    ALKPHOS 112  --   --    BILITOT 0.5  --   --      Recent Labs      09/24/17   1155  09/25/17   0545  09/25/17   1110   POCTGLUCOSE  86  86  90     Continuous  sodium chloride 0.9% Last Rate: 75 mL/hr at 09/26/17 1200   Scheduled  [START ON 9/27/2017] acyclovir 10 mg/kg Daily   amlodipine 5 mg Daily   atorvastatin 10 mg QHS   chlorhexidine 15 mL BID   dapsone 50 mg Daily   famotidine 20 mg Daily   fluconazole (DIFLUCAN) IVPB 400 mg Q24H   hydrALAZINE 100 mg Q8H   leucovorin 25 mg Weekly   levetiracetam 500 mg Daily   meropenem (MERREM) IVPB 1 g Q12H   pyrimethamine 50 mg Weekly   PRN  sodium chloride  Q24H PRN   hydrALAZINE 10 mg Q4H PRN   labetalol 10 mg Q4H PRN           Lines/Drains/Airways     Central Venous Catheter Line                 Tunneled Central Line Insertion/Assessment - Double Lumen  09/23/17 1100 right subclavian 3 days          Drain                 NG/OG Tube 09/25/17 1134 orogastric Center mouth 1 day    Male External Urinary Catheter  09/25/17 1345 Medium less than 1 day          Airway                 Airway - Non-Surgical 09/25/17 1129 Endotracheal Tube 1 day          Peripheral Intravenous Line                 Peripheral IV - Single Lumen 09/23/17 2250 Left Forearm 2 days         Peripheral IV - Single Lumen 09/25/17 1137 Right Wrist 1 day         Peripheral IV - Single Lumen 09/26/17 1113 Right Forearm less than 1 day

## 2017-09-26 NOTE — H&P
[facility]  Neurocritical Care  History & Physical    Admit Date: 9/23/2017  Service Date: 09/25/2017  Length of Stay: 2    Subjective:     Chief Complaint: Acute encephalopathy    History of Present Illness: 52 y.o. man with HIV/AIDS admitted from [facility] with acute encephalopathy.  Infectious work-up, EEG planned.  Primary reason for transfer was for LP - once this is obtained, will discuss possible return to sending facility for ongoing treatment.  The patient became unresponsive overnight.     Active Hospital Problems          *Acute encephalopathy [G93.40]                    Yes          AIDS [B20]                  Yes          HTN (hypertension) [I10]                    Yes          ESRD (end stage renal disease) [N18.6]                   Yes          Seizure [R56.9]                       Yes          Thrombocytopenia [D69.6]                 Yes     Resolved Hospital Problems           Diagnosis        Date Resolved            POA      Past Medical History:   Diagnosis Date    ESRD on hemodialysis     HIV (human immunodeficiency virus infection)     Seizures      History reviewed. No pertinent surgical history.   No current facility-administered medications on file prior to encounter.      No current outpatient prescriptions on file prior to encounter.      Allergies: Review of patient's allergies indicates no known allergies.    Family History   Problem Relation Age of Onset    Family history unknown: Yes     Social History   Substance Use Topics    Smoking status: Never Smoker    Smokeless tobacco: Never Used    Alcohol use No     Review of Systems   Unable to perform ROS: Patient nonverbal     Objective:     Vitals:  Temp: 98.7 °F (37.1 °C) (09/25/17 2300)  Pulse: 93 (09/26/17 0105)  Resp: 14 (09/26/17 0105)  BP: (!) 136/91 (09/26/17 0105)  SpO2: 100 % (09/26/17 0105)    Temp:  [97.3 °F (36.3 °C)-98.9 °F (37.2 °C)] 98.7 °F (37.1 °C)  Pulse:  [] 93  Resp:  [14-26] 14  SpO2:   [96 %-100 %] 100 %  BP: (102-175)/() 136/91         Vent Mode: A/C  Oxygen Concentration (%):  [] 50  Resp Rate Total:  [14 br/min-18 br/min] 16 br/min  Vt Set:  [450 mL] 450 mL  PEEP/CPAP:  [5 cmH20] 5 cmH20  Pressure Support:  [0 cmH20] 0 cmH20  Mean Airway Pressure:  [7.7 cmH20-9.4 cmH20] 8 cmH20    09/25 0701 - 09/26 0700  In: 782.5 [I.V.:722.5]  Out: 0     Physical Exam  Unable to test orientation, language, memory, judgment, insight, fund of knowledge, hearing, shoulder shrug, tongue protrusion, coordination, gait due to level of consciousness.  General   HEENT: ETT.   Chest Heart RRR / Lungs Clear to auscultation R sided trunneled dialysis catheter.   Adbomen: Soft nontender + BS  Extremities: OK distal pulses.  Skin: UK  Neurological Exam:  MS; Non responsive. L-gaze prefenrence. Not following commands.  CN: II-XII  L- gaze preference. Pupils 4mm reactive.   Motor: LUE   2/5 / RUE 0 /5  Tone normal bilaterally LUE flexion.             LLE  2 /5 /  RLE 0/5  Tone normal bilaterally  Sensory: LT/PP/T/ Vibration                  Complex sensory modalities: not tested  DTR:  normal throughout.  Coordination /Fine motor:   Gait: Not tested.  Meningeal signs: Absent     Today I personally reviewed pertinent medications, lines/drains/airways, imaging, cardiology, lab results, microbiology results, notably:      Assessment/Plan:     No new Assessment & Plan notes have been filed under this hospital service since the last note was generated.  Service: Neuro Critical Care        Active Problem List:   1.R-LMCA stroke  2. HIV  3. Thrombocytopaenia.  4. Hx CVD  5. CRF  6. R/C NCSE  7. R/O Herpes Encephalitis.     Assessment / Plan:     Neuro:  -CT head stat.R/O acute stroke.  -Keppra 500mg q 12hrs  -Lipitor 10mg qd  -EEG monitoring 24hrs after MRI/MRA head.   -MRI/MRA of the brain  -Carotid U/S.   Resp: CXR pre-intubation.  -AC  cc RR 12 and PEEP 5 and wean FIO2 to 40%,   -CXR psot  intubation  -ABG  CV: Keep SBP <180 mmHg.  -12 lead ECG  -TTE.  -Lipitor 10mg qd  -Amlodipine 5mg qd  IVF/nutrition/Renal/GI: CRF on HD MWF  -NS at 75 cc/hr IVD.  -OGT  -Condom catheter.and bladder scans q 6hrs.  -Check ammonia.  -Renal follow-up for HD.  -BR  Hem / ID: thrombocytopaenia.  -LDH, Fibrinogen, CBC with diff and look for schistocytes.  -PT/PTT  -CD4 and viral load.  -Rocephin 2gr qd.  -Azythromycin q week  -Dapsone.  -Pyrimethamine  -Leukovorin.  -ID consultation.  -Start Acyclovir IV  -LP    Tube 1: Cell count and differential     Tube 2: Lactic acid, proteins, glucose    Tube 3: Cryto antigen, gram stains , jaya ink, AFB stain. Bacterial cultures, fungal cultures, PCRs HSV1, HSV2, HSV6, HZV, CMV IgG/M; EBV IGG/IGM, Toxoplasma IGG and IGM    Tube 4: Cell count and differential.  Endo: LDL 71.   -TSH/Free T4.  -B12 and folate levels..   Prophylaxis:  SCDs  Advance Directives and Disposition:    Full Code. Admit to the NICU. Stroke consultation.              Uninterrupted Critical Care/Counseling Time (directly spent today by me not including procedures 30-74 min ([id number])): =  35  min  Activity Orders          None        Full Code    Julio Granger MD  Neurocritical Care  [facility]

## 2017-09-26 NOTE — SUBJECTIVE & OBJECTIVE
Past Medical History:   Diagnosis Date    ESRD on hemodialysis     HIV (human immunodeficiency virus infection)     Seizures        Past Surgical History:   Procedure Laterality Date    LUMBAR PUNCTURE  9/26/2017            Review of patient's allergies indicates:  No Known Allergies    Medications:  Prescriptions Prior to Admission   Medication Sig    cloNIDine (CATAPRES) 0.1 MG tablet Take 0.1 mg by mouth 2 (two) times daily.    hydrALAZINE (APRESOLINE) 50 MG tablet Take 50 mg by mouth every 6 (six) hours.     lamivudine-zidovudine 150-300mg (COMBIVIR) 150-300 mg Tab Take 1 tablet by mouth every 12 (twelve) hours.    levetiracetam (KEPPRA) 1000 MG tablet Take 500 mg by mouth every Mon, Wed, Fri. Take after dialysis.     Antibiotics     Start     Stop Route Frequency Ordered    09/26/17 0900  dapsone tablet 50 mg      -- PER NG TUBE Daily 09/25/17 1743    09/26/17 0600  meropenem-0.9% sodium chloride 1 g/50 mL IVPB      -- IV Every 12 hours (non-standard times) 09/26/17 0449        Antifungals     Start     Stop Route Frequency Ordered    09/26/17 1200  fluconazole (DIFLUCAN) IVPB 400 mg 200 mL      -- IV Every 24 hours (non-standard times) 09/26/17 1058        Antivirals         Stop Route Frequency     acyclovir (ZOVIRAX) injection      -- IV Daily             There is no immunization history on file for this patient.    Family History     Family history is unknown by patient.        Social History     Social History    Marital status:      Spouse name: N/A    Number of children: N/A    Years of education: N/A     Social History Main Topics    Smoking status: Never Smoker    Smokeless tobacco: Never Used    Alcohol use No    Drug use: No    Sexual activity: Yes     Partners: Female     Other Topics Concern    None     Social History Narrative    None     Review of Systems   Unable to perform ROS: Intubated     Objective:     Vital Signs (Most Recent):  Temp: 98 °F (36.7 °C) (09/26/17  1500)  Pulse: 108 (09/26/17 1600)  Resp: (!) 21 (09/26/17 1600)  BP: (!) 137/93 (09/26/17 1600)  SpO2: 100 % (09/26/17 1600) Vital Signs (24h Range):  Temp:  [97 °F (36.1 °C)-98.8 °F (37.1 °C)] 98 °F (36.7 °C)  Pulse:  [] 108  Resp:  [14-26] 21  SpO2:  [100 %] 100 %  BP: (113-164)/() 137/93     Weight: 70.6 kg (155 lb 9.6 oz)  Body mass index is 21.1 kg/m².    Estimated Creatinine Clearance: 6.3 mL/min (based on SCr of 13.5 mg/dL (H)).    Physical Exam   Constitutional: He appears well-developed and well-nourished.   Eyes: Pupils are equal, round, and reactive to light.   Neck: Neck supple.   Cardiovascular: Normal rate, regular rhythm and normal heart sounds.  Exam reveals no friction rub.    No murmur heard.  Pulmonary/Chest: Effort normal and breath sounds normal. He has no wheezes. He has no rales.   intubated   Abdominal: Soft. Bowel sounds are normal. He exhibits no distension. There is no tenderness. There is no guarding.   Musculoskeletal: He exhibits no edema or deformity.   Skin: Skin is dry. No rash noted. No erythema.       Significant Labs:   Blood Culture:     Recent Labs  Lab 09/23/17  2039 09/23/17 2122 09/24/17  0013   LABBLOO No Growth to date  No Growth to date  No Growth to date No Growth to date  No Growth to date  No Growth to date No Growth to date  No Growth to date  No Growth to date     BMP:     Recent Labs  Lab 09/26/17  0145   GLU 95   *   K 4.5   CL 98   CO2 21*   BUN 56*   CREATININE 13.5*   CALCIUM 7.7*   MG 2.0     C4 Count: No results for input(s): C4 in the last 48 hours.  CBC:     Recent Labs  Lab 09/25/17  1432 09/26/17  0145 09/26/17  1203   WBC 5.36 4.42 5.57   HGB 9.1* 8.3* 7.7*   HCT 27.8* 25.1* 23.5*   PLT 58* 48* 131*       Significant Imaging: I have reviewed all pertinent imaging results/findings within the past 24 hours.

## 2017-09-26 NOTE — PLAN OF CARE
Problem: Patient Care Overview  Goal: Individualization & Mutuality  Outcome: Ongoing (interventions implemented as appropriate)  POC reviewed with pt and pt's wife, Jeni, at 1400. Pt's wife verbalized understanding of the plan of care. Questions and concerns addressed. LP performed at bedside today, CSF sent off.  Continuous EEG.  Plan for HD this evening.  Pt progressing toward goals. Will continue to monitor. See flowsheets for full assessment and VS info.

## 2017-09-26 NOTE — PROCEDURES
[facility]  Lumbar Puncture  Procedure Note    SUMMARY     Date of Procedure: 9/26/2017    Procedure: Diagnostic Lumbar Puncture    Provider: Jj Staples MD,  Estefanía Fu MD    Assisting Provider: Harrison Nesbitt MD    Indications: Diagnostic    Pre-Operative Diagnosis: Encephalopathy    Post-Operative Diagnosis: Encephalopathy     Anesthesia: Local with 2cc of 1% Lidocaine    Description of the Findings of the Procedure:    Consent: Informed consent was obtained. Risks of the procedure were discussed including: infection, bleeding, pain and headache.    The patient was positioned under sterile conditions. Betadine solution and sterile drapes were utilized. A spinal needle was inserted at the L3 - L4 interspace.   Spinal fluid was obtained and sent to the laboratory.    21mL of clear spinal fluid was obtained.  Opening Pressure: 23cm H2O pressure.      Plan:    Bed rest for 2 hours.  Tylenol 650 mg for pain.  Call office if you develop a severe headache, nausea, vomiting, or fever greater than 100.5 F.      Complications: None; patient tolerated the procedure well.    Estimated Blood Loss (EBL): Minimal           Drains: none    Implants: none    Specimens: CSF to be sent for            Condition: stable    Disposition: ICU - intubated and critically ill.    Attestation: I was present for the entire procedure.       Harrison Nesbitt MD   Resident Physician  Neuro Critical Care  [facility]

## 2017-09-26 NOTE — PROGRESS NOTES
[facility]  Neurocritical Care  Progress Note    Admit Date: 9/23/2017  Service Date: 09/26/2017  Length of Stay: 3    Subjective:     Chief Complaint: Acute encephalopathy    History of Present Illness: 52 y.o. man with HIV/AIDS admitted from [facility] with acute encephalopathy.  Infectious work-up, EEG planned.  Primary reason for transfer was for LP - once this is obtained, will discuss possible return to sending facility for ongoing treatment.  The patient became unresponsive overnight.     Active Hospital Problems          *Acute encephalopathy [G93.40]                    Yes          AIDS [B20]                  Yes          HTN (hypertension) [I10]                    Yes          ESRD (end stage renal disease) [N18.6]                   Yes          Seizure [R56.9]                       Yes          Thrombocytopenia [D69.6]                 Yes     Resolved Hospital Problems           Diagnosis        Date Resolved            POA      Hospital Course: 9/25: The patient became unresponsive overnight. Patient CT head showed no acute strokes. Pending MRI/MRA. BS was 90. VS within normal . O2 sats 93%.     Interval History:  No acute adverse events overnight    Review of Systems  Unable to obtain a complete ROS due to level of consciousness.  Objective:     Vitals:  Temp: 97.2 °F (36.2 °C) (09/26/17 1052)  Pulse: 100 (09/26/17 1200)  Resp: 17 (09/26/17 1200)  BP: (!) 132/90 (09/26/17 1200)  SpO2: 100 % (09/26/17 1200)    Temp:  [97 °F (36.1 °C)-98.8 °F (37.1 °C)] 97.2 °F (36.2 °C)  Pulse:  [] 100  Resp:  [14-26] 17  SpO2:  [98 %-100 %] 100 %  BP: (113-175)/() 132/90         Vent Mode: A/C  Oxygen Concentration (%):  [] 50  Resp Rate Total:  [14 br/min-18 br/min] 16 br/min  Vt Set:  [450 mL] 450 mL  PEEP/CPAP:  [5 cmH20] 5 cmH20  Pressure Support:  [0 cmH20] 0 cmH20  Mean Airway Pressure:  [7.7 cmH20-9.4 cmH20] 8.7 cmH20    09/25 0701 - 09/26 0700  In: 2102.5 [I.V.:1282.5]  Out:  0     Physical Exam   Constitutional: He appears well-developed.   HENT:   Head: Normocephalic.   Eyes: Pupils are equal, round, and reactive to light.   Cardiovascular: Normal rate and regular rhythm.    Pulmonary/Chest:   Intubated   Abdominal: Soft.   Neurological: GCS eye subscore is 1. GCS verbal subscore is 1. GCS motor subscore is 2.   PERRL  Corneal intact BL  OC reflex intact  Gag/cough intact  Breathing over vent  Triple flexion BL LE to noxious stim  De-cerebrate posturing BL UE to noxious stim   Vitals reviewed.      Medications:  Continuous  sodium chloride 0.9% Last Rate: 75 mL/hr at 09/26/17 1200   Scheduled  [START ON 9/27/2017] acyclovir 10 mg/kg Daily   amlodipine 5 mg Daily   atorvastatin 10 mg QHS   chlorhexidine 15 mL BID   dapsone 50 mg Daily   famotidine 20 mg Daily   fluconazole (DIFLUCAN) IVPB 400 mg Q24H   hydrALAZINE 100 mg Q8H   leucovorin 25 mg Weekly   levetiracetam 500 mg Daily   meropenem (MERREM) IVPB 1 g Q12H   pyrimethamine 50 mg Weekly   PRN  sodium chloride  Q24H PRN   hydrALAZINE 10 mg Q4H PRN   labetalol 10 mg Q4H PRN       Pulse: 100 (09/26/17 1200)  Resp: 17 (09/26/17 1200)  BP: (!) 132/90 (09/26/17 1200)  SpO2: 100 % (09/26/17 1200)           Vent Mode: A/C  Oxygen Concentration (%):  [] 50  Resp Rate Total:  [14 br/min-18 br/min] 16 br/min  Vt Set:  [450 mL] 450 mL  PEEP/CPAP:  [5 cmH20] 5 cmH20  Pressure Support:  [0 cmH20] 0 cmH20  Mean Airway Pressure:  [7.7 cmH20-9.4 cmH20] 8.7 cmH20    09/25 0701 - 09/26 0700  In: 2102.5 [I.V.:1282.5]  Out: 0        3  09/25 0701 - 09/26 0700  In: 2102.5 [I.V.:1282.5]  Out: 0      Recent Labs  Lab 09/26/17  0415   PH 7.415   PCO2 43.6   PO2 414*   BE 3     Recent Labs  Lab 09/26/17  0145   *   K 4.5   CL 98   CO2 21*   BUN 56*   CREATININE 13.5*   CALCIUM 7.7*   MG 2.0   PHOS 8.7*  8.7*     Recent Labs  Lab 09/26/17 0145 09/26/17  1203   WBC 4.42 5.57   HGB 8.3* 7.7*   PLT 48*  --    INR 1.0  --    APTT 29.1  --       Recent Labs  Lab 09/25/17  1514 09/25/17  1649 09/26/17  0145   PROT 7.7  --   --    ALBUMIN 2.0*  --  1.8*   AST 87*  --   --    ALT 49*  --   --    LDH  --  475*  --    ALKPHOS 112  --   --    BILITOT 0.5  --   --      Recent Labs      09/24/17   1155  09/25/17   0545  09/25/17   1110   POCTGLUCOSE  86  86  90     Continuous  sodium chloride 0.9% Last Rate: 75 mL/hr at 09/26/17 1200   Scheduled  [START ON 9/27/2017] acyclovir 10 mg/kg Daily   amlodipine 5 mg Daily   atorvastatin 10 mg QHS   chlorhexidine 15 mL BID   dapsone 50 mg Daily   famotidine 20 mg Daily   fluconazole (DIFLUCAN) IVPB 400 mg Q24H   hydrALAZINE 100 mg Q8H   leucovorin 25 mg Weekly   levetiracetam 500 mg Daily   meropenem (MERREM) IVPB 1 g Q12H   pyrimethamine 50 mg Weekly   PRN  sodium chloride  Q24H PRN   hydrALAZINE 10 mg Q4H PRN   labetalol 10 mg Q4H PRN           Lines/Drains/Airways     Central Venous Catheter Line                 Tunneled Central Line Insertion/Assessment - Double Lumen  09/23/17 1100 right subclavian 3 days          Drain                 NG/OG Tube 09/25/17 1134 orogastric Center mouth 1 day    Male External Urinary Catheter 09/25/17 1345 Medium less than 1 day          Airway                 Airway - Non-Surgical 09/25/17 1129 Endotracheal Tube 1 day          Peripheral Intravenous Line                 Peripheral IV - Single Lumen 09/23/17 2250 Left Forearm 2 days         Peripheral IV - Single Lumen 09/25/17 1137 Right Wrist 1 day         Peripheral IV - Single Lumen 09/26/17 1113 Right Forearm less than 1 day                        Assessment/Plan:     Neuro   * Acute encephalopathy    MRI with evidence of cerebritis  Most likely infectious in etiology  Continue broad spec abx  Antifungal coverage added today  LP today when plt count is >50K  Continue HIV PPx   Greatly appreciate ID input  Continue AED - f/u EEG        Seizure    Continue Keppra  F/u EEG        Cardiac/Vascular   HTN (hypertension)    Low dose  norvasc        Renal/   ESRD (end stage renal disease)    Renal following - appreciated  Fistula in place  Likely SLED given intubation and overall neurologic status        ID   AIDS    Continue ppx  Cannot use Bactrim in acute renal failure in the setting of hyperkalemia and thrombocytopenia - continue leucovorin, dapsone,and pyrimethamine        Hematology   Thrombocytopenia    Platelet transfusion prior to LP  Monitor              Prophylaxis:  Venous Thromboembolism: mechanical  Stress Ulcer: H2B  Ventilator Pneumonia: not applicable     Activity Orders          Bed rest starting at 09/26 1245        Full Code    Jj Staples MD  Neurocritical Care  [facility]

## 2017-09-26 NOTE — PLAN OF CARE
Problem: Patient Care Overview  Goal: Plan of Care Review  Outcome: Ongoing (interventions implemented as appropriate)  POC reviewed with pt and wife at 0400. Pt's wife verbalized understanding. Questions and concerns addressed. No acute events overnight. Pt DBP still running high, per Andrews NP- call for DBP>105.  Pt progressing toward goals. Will continue to monitor. See flowsheets for full assessment and VS info
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
## 2017-09-26 NOTE — ASSESSMENT & PLAN NOTE
MRI with evidence of cerebritis  Most likely infectious in etiology  Continue broad spec abx  Antifungal coverage added today  LP today when plt count is >50K  Continue HIV PPx   Greatly appreciate ID input  Continue AED - f/u EEG

## 2017-09-26 NOTE — ASSESSMENT & PLAN NOTE
-Patient is ESRD on HD since May 2017? Underlying etiology uncertain, ?HIV nephropathy, reportedly last received session on Friday.   -MRI concern for cerebritis or other infection. ID following and plan for LP  -CXR clear. Cr 13.5 with no UOP over 24 hrs. Phos 8.7<4.9. Afebrile and stable BP -140's. On NS at 100 cc/hr  -Hold IVF. SLED x 8 hrs for toxin removal. No UF.

## 2017-09-26 NOTE — PROGRESS NOTES
Pharmacist Renal Dose Adjustment Note    Volodymyr Pope is a 52 y.o. male being treated with the medication Acyclovir    Patient Data:    Vital Signs (Most Recent):  Temp: 97 °F (36.1 °C) (09/26/17 0705)  Pulse: 102 (09/26/17 0721)  Resp: 17 (09/26/17 0721)  BP: 117/76 (09/26/17 0705)  SpO2: 100 % (09/26/17 0721) Vital Signs (72h Range):  Temp:  [97 °F (36.1 °C)-98.9 °F (37.2 °C)]   Pulse:  []   Resp:  [14-26]   BP: (102-190)/()   SpO2:  [96 %-100 %]        Recent Labs     Lab 09/25/17  0346 09/25/17  1514 09/26/17  0145   CREATININE 12.1* 13.3* 13.5*     Serum creatinine: 13.5 mg/dL High 09/26/17 0145  Estimated creatinine clearance: 6.3 mL/min    Acyclovir 710 mg Q8H will be switched to Acyclovir 710 mg Q24H    Pharmacist's Name: Preeti Teague, Remy  Pharmacist's Extension: [phone]

## 2017-09-26 NOTE — ASSESSMENT & PLAN NOTE
Renal following - appreciated  Fistula in place  Likely SLED given intubation and overall neurologic status

## 2017-09-26 NOTE — ASSESSMENT & PLAN NOTE
Several days of encephalopathy with deterioration today with L gaze preference and LUE tonic flexion.  Most suspicious of seizures (R frontal focus).  Ischemic stroke(s) in differential as is encephalitis.  Not a tPA candidate as last known well several days ago.    Rec:  EEG monitoring  MRI/MRA brain and MRA neck findings most consistent with cerebritis, unlikely 2/2 stroke  Will sign off, please do not hesitate to contact with any further questions

## 2017-09-26 NOTE — SUBJECTIVE & OBJECTIVE
Past Medical History:   Diagnosis Date    ESRD on hemodialysis     HIV (human immunodeficiency virus infection)     Seizures      History reviewed. No pertinent surgical history.   No current facility-administered medications on file prior to encounter.      No current outpatient prescriptions on file prior to encounter.      Allergies: Review of patient's allergies indicates no known allergies.    Family History   Problem Relation Age of Onset    Family history unknown: Yes     Social History   Substance Use Topics    Smoking status: Never Smoker    Smokeless tobacco: Never Used    Alcohol use No     Review of Systems   Unable to perform ROS: Patient nonverbal     Objective:     Vitals:  Temp: 98.7 °F (37.1 °C) (09/25/17 2300)  Pulse: 93 (09/26/17 0105)  Resp: 14 (09/26/17 0105)  BP: (!) 136/91 (09/26/17 0105)  SpO2: 100 % (09/26/17 0105)    Temp:  [97.3 °F (36.3 °C)-98.9 °F (37.2 °C)] 98.7 °F (37.1 °C)  Pulse:  [] 93  Resp:  [14-26] 14  SpO2:  [96 %-100 %] 100 %  BP: (102-175)/() 136/91         Vent Mode: A/C  Oxygen Concentration (%):  [] 50  Resp Rate Total:  [14 br/min-18 br/min] 16 br/min  Vt Set:  [450 mL] 450 mL  PEEP/CPAP:  [5 cmH20] 5 cmH20  Pressure Support:  [0 cmH20] 0 cmH20  Mean Airway Pressure:  [7.7 cmH20-9.4 cmH20] 8 cmH20    09/25 0701 - 09/26 0700  In: 782.5 [I.V.:722.5]  Out: 0     Physical Exam  Unable to test orientation, language, memory, judgment, insight, fund of knowledge, hearing, shoulder shrug, tongue protrusion, coordination, gait due to level of consciousness.  General   HEENT: ETT.   Chest Heart RRR / Lungs Clear to auscultation R sided trunneled dialysis catheter.   Adbomen: Soft nontender + BS  Extremities: OK distal pulses.  Skin: UK  Neurological Exam:  MS; Non responsive. L-gaze prefenrence. Not following commands.  CN: II-XII  L- gaze preference. Pupils 4mm reactive.   Motor: LUE   2/5 / RUE 0 /5  Tone normal bilaterally LUE flexion.             LLE  2  /5 /  RLE 0/5  Tone normal bilaterally  Sensory: LT/PP/T/ Vibration UK                 Complex sensory modalities: not tested  DTR:  normal throughout.  Coordination /Fine motor: UK  Gait: Not tested.  Meningeal signs: Absent     Today I personally reviewed pertinent medications, lines/drains/airways, imaging, cardiology, lab results, microbiology results, notably:

## 2017-09-26 NOTE — HPI
52 y.o. man with HIV/AIDS admitted from OSH with acute encephalopathy.  Infectious work-up, EEG planned.  Primary reason for transfer was for LP - once this is obtained, will discuss possible return to sending facility for ongoing treatment.  The patient became unresponsive overnight.     Active Hospital Problems          *Acute encephalopathy [G93.40]                    Yes          AIDS [B20]                  Yes          HTN (hypertension) [I10]                    Yes          ESRD (end stage renal disease) [N18.6]                   Yes          Seizure [R56.9]                       Yes          Thrombocytopenia [D69.6]                 Yes     Resolved Hospital Problems           Diagnosis        Date Resolved            POA

## 2017-09-26 NOTE — ASSESSMENT & PLAN NOTE
Continue ppx  Cannot use Bactrim in acute renal failure in the setting of hyperkalemia and thrombocytopenia - continue leucovorin, dapsone,and pyrimethamine

## 2017-09-26 NOTE — PROGRESS NOTES
[facility]  Nephrology  Progress Note    Patient Name: Volodymyr Pope  MRN: [id number]  Admission Date: 9/23/2017  Hospital Length of Stay: 3 days  Attending Provider: Julio Granger MD   Primary Care Physician: Primary Doctor No  Principal Problem:Acute encephalopathy    Subjective:     HPI: 53 yearold male with AIDS (CD 49 not on ART or PPx), ESRD on HD (M,W,F), and seizure disorder presents as a transfer from [facility] for Neurology eval and LP.  Patient presented to OSH on 9/15 from a dialysis center for HTN.  He was admitted for HTN urgency and routine workup revealed thrombocytopenia of 19,000.  His hospital course was complicated by AMS with lethargy.  MRI revealed multiple areas concerning for embolic strokes with seziure activity.  Neurology initially felt his AMS and resulting seizure was 2/2 to stroke and started him on Keppra, ASA and stain.  His mental status initially improved however he was found to be HTN with a fever and spent a brief time in the ICU.  Patient was started on ceftriaxone and Vanc given concern for UTI.  After stepping down to the floor patient was once again found to be lethargic and confused.  Neurology wished to pursue LP but due to platelet count of 52,000 they felt uncomfortable due to lack of Neurosurgery and this is the reason for transfer to [facility].  Other PMH significant for poorly controlled HTN and HIV/?AIDS with uncertain compliance.  Patient reportedly initially was Aox4, but when I see him he is not alert, not answering questions, arousable, but barely.     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    Prior to hospital admit in [place] he was receiving HD at [facility] in [place] on MWF through a R sided tunneled access line (reportedly w/o access problems and access placed in May), his nephrologist in [place] is Dr Hammond, treatment duration 4hrs, EDW: 78 kg, Normal UF 1-2 L, he does have residual renal function, uncertain how much, although noted he has made about 600ccs of urine overnight.    He reportedly received dialysis this past Friday, his lytes, acid/base and volume status are stable this morning, he is actually net negative 400ccs overnight, and appears to be below his EDW.      Interval History: No sedation. Intubated. Not responding to simple commands. No family at bedside. Anuric. CEEG. MRI with finding concerning for cerebritis and other infectious process. Plan for LP. ID following.     Review of patient's allergies indicates:  No Known Allergies  Current Facility-Administered Medications   Medication Frequency    0.9%  NaCl infusion (for blood administration) Q24H PRN    0.9%  NaCl infusion Continuous    [START ON 9/27/2017] acyclovir (ZOVIRAX) 710 mg in dextrose 5 % 250 mL IVPB Daily    amlodipine tablet 5 mg Daily    atorvastatin tablet 10 mg QHS    chlorhexidine 0.12 % solution 15 mL BID    dapsone tablet 50 mg Daily    famotidine tablet 20 mg Daily    fluconazole (DIFLUCAN) IVPB 400 mg 200 mL Q24H    hydrALAZINE injection 10 mg Q4H PRN    hydrALAZINE tablet 100 mg Q8H    labetalol injection 10 mg Q4H PRN    leucovorin tablet 25 mg Weekly    levetiracetam tablet 500 mg Daily    lidocaine HCL 10 mg/ml (1%) 10 mg/mL (1 %) injection     meropenem-0.9% sodium chloride 1 g/50 mL IVPB Q12H    pyrimethamine tablet 50 mg Weekly       Objective:     Vital Signs (Most Recent):  Temp: 97.2 °F (36.2 °C) (09/26/17 1052)  Pulse: 100 (09/26/17 1313)  Resp: 18 (09/26/17 1313)  BP: (!) 141/94 (09/26/17 1300)  SpO2: 100 % (09/26/17 1313)  O2 Device (Oxygen Therapy): ventilator (09/26/17  1313) Vital Signs (24h Range):  Temp:  [97 °F (36.1 °C)-98.8 °F (37.1 °C)] 97.2 °F (36.2 °C)  Pulse:  [] 100  Resp:  [14-26] 18  SpO2:  [100 %] 100 %  BP: (113-173)/() 141/94     Weight: 70.6 kg (155 lb 9.6 oz) (09/23/17 1957)  Body mass index is 21.1 kg/m².  Body surface area is 1.89 meters squared.    I/O last 3 completed shifts:  In: 2102.5 [I.V.:1282.5; NG/GT:120; IV Piggyback:700]  Out: 0     Physical Exam    Significant Labs:  All labs within the past 24 hours have been reviewed.     Significant Imaging:  Labs: Reviewed    Assessment/Plan:     ESRD (end stage renal disease)    -Patient is ESRD on HD since May 2017? Underlying etiology uncertain, ?HIV nephropathy, reportedly last received session on Friday.   -MRI concern for cerebritis or other infection. ID following and plan for LP  -CXR clear. Cr 13.5 with no UOP over 24 hrs. Phos 8.7<4.9. Afebrile and stable BP -140's. On NS at 100 cc/hr  -Recommend discontinuing IVF. SLED x 8 hrs for toxin removal. No UF.             Thank you for your consult. I will follow-up with patient. Please contact us if you have any additional questions.    Kelin Kee NP  Nephrology  [facility]

## 2017-09-26 NOTE — ASSESSMENT & PLAN NOTE
Patient with worsening lethargy and encephalopathy since admission. MRI and clinical course not suggestive of bacterial meningitis, on meropenam. MRI with multifocal gyral diffusion restriction and T2/FLAIR hyperintensity involving the bilateral frontal and parietal lobes (particularly the left frontal eye field). Result suggestive of HIV encephalopathy/HIV associated infection, seizure activity, or multifocal infarcts. Also considered PML with out CSF samples to evaluate difficult to determine if infectious cause. MRI with no enhancing lesions or mass effect suggestive of cryptococcus with also negative antigen result or toxoplasmosis. If PML or HIV encephalopathy management is supportive. Cidofovir only recommended on patient that had been on HAART prior to PML. Now in the NICU as of 9/25, and intubated, due to worsening AMS.    - awaiting LP result  - will follow Toxoplasma IgG, ANCA, west nile serology, CMV and RPR    - send for G6PD

## 2017-09-26 NOTE — SUBJECTIVE & OBJECTIVE
Interval History: No sedation. Intubated. Not responding to simple commands. No family at bedside. Anuric. CEEG. MRI with finding concerning for cerebritis and other infectious process. Plan for LP. ID following.     Review of patient's allergies indicates:  No Known Allergies  Current Facility-Administered Medications   Medication Frequency    0.9%  NaCl infusion (for blood administration) Q24H PRN    0.9%  NaCl infusion Continuous    [START ON 9/27/2017] acyclovir (ZOVIRAX) 710 mg in dextrose 5 % 250 mL IVPB Daily    amlodipine tablet 5 mg Daily    atorvastatin tablet 10 mg QHS    chlorhexidine 0.12 % solution 15 mL BID    dapsone tablet 50 mg Daily    famotidine tablet 20 mg Daily    fluconazole (DIFLUCAN) IVPB 400 mg 200 mL Q24H    hydrALAZINE injection 10 mg Q4H PRN    hydrALAZINE tablet 100 mg Q8H    labetalol injection 10 mg Q4H PRN    leucovorin tablet 25 mg Weekly    levetiracetam tablet 500 mg Daily    lidocaine HCL 10 mg/ml (1%) 10 mg/mL (1 %) injection     meropenem-0.9% sodium chloride 1 g/50 mL IVPB Q12H    pyrimethamine tablet 50 mg Weekly       Objective:     Vital Signs (Most Recent):  Temp: 97.2 °F (36.2 °C) (09/26/17 1052)  Pulse: 100 (09/26/17 1313)  Resp: 18 (09/26/17 1313)  BP: (!) 141/94 (09/26/17 1300)  SpO2: 100 % (09/26/17 1313)  O2 Device (Oxygen Therapy): ventilator (09/26/17 1313) Vital Signs (24h Range):  Temp:  [97 °F (36.1 °C)-98.8 °F (37.1 °C)] 97.2 °F (36.2 °C)  Pulse:  [] 100  Resp:  [14-26] 18  SpO2:  [100 %] 100 %  BP: (113-173)/() 141/94     Weight: 70.6 kg (155 lb 9.6 oz) (09/23/17 1957)  Body mass index is 21.1 kg/m².  Body surface area is 1.89 meters squared.    I/O last 3 completed shifts:  In: 2102.5 [I.V.:1282.5; NG/GT:120; IV Piggyback:700]  Out: 0     Physical Exam    Significant Labs:  All labs within the past 24 hours have been reviewed.     Significant Imaging:  Labs: Reviewed

## 2017-09-26 NOTE — CONSULTS
History and Physical Exam  Ophthalmology Consult Service    CC: AIDS with low CD4 count.    HPI: Volodymyr Pope is a 52 y.o. male with PMH of AIDS (not on HAART) - CD4 50; ESRD on HD and seizure disorder transferred from [facility] for further evaluation of new onset encephalopathy.   MRI with no obvious lesions and rules out toxoplasmosis and primary CNS mallignancies. Procalcitonin level >100; although not predictable in renal failure - too high to ignore. Patient became unresponsive overnight and now intubated.   Ophthalmology was consulted given low CD4 count to look at fundus to see if there is possible fungal vitreitis / endophthalmitis.       POH: None  POSHx: None  FOHx: None    Gtts: None      Past Medical History:   Diagnosis Date    ESRD on hemodialysis     HIV (human immunodeficiency virus infection)     Seizures          Family History   Problem Relation Age of Onset    Family history unknown: Yes           Current Facility-Administered Medications:     0.9%  NaCl infusion (for blood administration), , Intravenous, Q24H PRN, Andrews Smith, NP    0.9%  NaCl infusion, , Intravenous, Continuous, CHRIS Gutierrez-NADIA, Last Rate: 75 mL/hr at 09/26/17 1300    [START ON 9/27/2017] acyclovir (ZOVIRAX) 710 mg in dextrose 5 % 250 mL IVPB, 10 mg/kg, Intravenous, Daily, CHRIS Gutierrez-NADIA    amlodipine tablet 5 mg, 5 mg, Per NG tube, Daily, Tana-Selena Valencia PA-C, 5 mg at 09/26/17 0845    atorvastatin tablet 10 mg, 10 mg, Per NG tube, QHS, Gladys Valencia PA-C, 10 mg at 09/25/17 2200    chlorhexidine 0.12 % solution 15 mL, 15 mL, Mouth/Throat, BID, Jj Staples MD, 15 mL at 09/26/17 1048    dapsone tablet 50 mg, 50 mg, Per NG tube, Daily, Gladys Valencia PA-C, 50 mg at 09/26/17 0846    famotidine tablet 20 mg, 20 mg, Per NG tube, Daily, Jj Staples MD, 20 mg at 09/26/17 1048    fluconazole (DIFLUCAN) IVPB 400 mg 200 mL, 400 mg, Intravenous, Q24H, Jj ESPITIA  MD Bel, Last Rate: 100 mL/hr at 09/26/17 1135, 400 mg at 09/26/17 1135    hydrALAZINE injection 10 mg, 10 mg, Intravenous, Q4H PRN, Tana-Selena Puentesgle, PA-C, 10 mg at 09/25/17 1424    hydrALAZINE tablet 100 mg, 100 mg, Per NG tube, Q8H, Tana-Selena Puentesgle, PA-C, 100 mg at 09/26/17 0552    labetalol injection 10 mg, 10 mg, Intravenous, Q4H PRN, Tana-Selena Puentesgle, PA-C, 10 mg at 09/25/17 1437    leucovorin tablet 25 mg, 25 mg, Per NG tube, Weekly, Tana-Selena Puentesgle, PA-C, 25 mg at 09/26/17 0846    levetiracetam tablet 500 mg, 500 mg, Per NG tube, Daily, Tana-Selena Valencia, PA-C, 500 mg at 09/26/17 0845    lidocaine HCL 10 mg/ml (1%) 10 mg/mL (1 %) injection, , , ,     meropenem-0.9% sodium chloride 1 g/50 mL IVPB, 1 g, Intravenous, Q12H, Antonietta Barr MD, Last Rate: 50 mL/hr at 09/26/17 0552, 1 g at 09/26/17 0552    pyrimethamine tablet 50 mg, 50 mg, Per NG tube, Weekly, Tana-Selena Valencia, PA-C, 50 mg at 09/26/17 0846      Review of patient's allergies indicates:  No Known Allergies      Social History   Substance Use Topics    Smoking status: Never Smoker    Smokeless tobacco: Never Used    Alcohol use No         Base Eye Exam     Tonometry (Tonopen, 1:29 PM)       Right Left    Pressure 10 8          Pupils       Pupils Dark Light Shape React APD    Right PERRL 4 2 Round Slow None    Left PERRL 4 2 Round Slow None          Neuro/Psych     Mood/Affect:  sedated / intubated          Dilation     Both eyes:  1% Mydriacyl, 2.5% Phenylephrine @ 1:30 PM            Slit Lamp and Fundus Exam     External Exam       Right Left    External Normal Normal          Slit Lamp Exam       Right Left    Lids/Lashes Normal Normal    Conjunctiva/Sclera 2+ Chemosis 2+ Chemosis    Cornea Clear Clear    Iris Pharm dialated Pharm dialated    Lens Clear Clear    Vitreous Normal Normal          Fundus Exam       Right Left    Disc Normal Temporal thinning    C/D Ratio 0.25 0.35    Macula Normal Normal     Vessels 3-4 small white lesions adjacent to the vascular arcades with possible elevation superiorly and inferiorly  3-4 small white lesions adjacent to the vascular arcades with possible elevation superiorly and inferiorly    Periphery Normal Normal                  Plan   A/P: Volodymyr Pope is a 52 y.o. male with AIDS and low CD4 count who became unresponsive with no cause. Ophthalmology is assessing for possible fungal infection in CSF / eye.    White small perivascular lesions near vascular arcades  (DDx: HIV retinopathy, Fungal vitreous plaques)  - 3-5 small white lesions near superior and inferior vascular arcades in both eyes. Questionable elevation from the retina.   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   - Recommend continuing pt on fluconazole since this has good ocular penetration and is a good treatment for fungal vitreal involvement / fungal vitreitis.  - Usually once CD4 counts get higher than 200, the retinopathy will dissipate within 1-2 weeks. If this is fungal, there might be an initial worsening followed by resolution usually 2-4 weeks (slightly longer).   - Will continue to f/u (from a distance) to monitor change in lesions.    Chemosis and Lagophthalmos  - Given moderate chemosis preventing full lid closure, will recommend Erythromycin ointment bid - tid to keep eye hydrated and prevent desiccation.       ----------------------------------------------------------------------------------------------------------  Thank you for the consult. Feel free to contact me if you have any additional questions.    SAMANTHA Figueroa M.D.  PGY-2 - [facility] Ophthalmology  Pager: [phone]

## 2017-09-26 NOTE — HOSPITAL COURSE
9/25: The patient became unresponsive overnight. Patient CT head showed no acute strokes. Pending MRI/MRA. BS was 90. VS within normal . O2 sats 93%.

## 2017-09-26 NOTE — PLAN OF CARE
09/26/17 1636   Discharge Assessment   Assessment Type Discharge Planning Assessment   Confirmed/corrected address and phone number on facesheet? Yes   Assessment information obtained from? Caregiver  (S/o and Jeni OROZCO)   Expected Length of Stay (days) 5   Communicated expected length of stay with patient/caregiver yes   Prior to hospitilization cognitive status: Alert/Oriented   Prior to hospitalization functional status: Independent   Current cognitive status: Coma/Sedated/Intubated   Current Functional Status: Completely Dependent   Facility Arrived From: [facility] in [place], [place]   Lives With significant other   Able to Return to Prior Arrangements unable to determine at this time (comments)   Is patient able to care for self after discharge? Unable to determine at this time (comments)   Who are your caregiver(s) and their phone number(s)? Jeni Thompson (S/0) [phone]   Patient's perception of discharge disposition other (comments)  (star)   Readmission Within The Last 30 Days no previous admission in last 30 days   Patient currently being followed by outpatient case management? No   Patient currently receives any other outside agency services? No   Equipment Currently Used at Home none   Do you have any problems affording any of your prescribed medications? No   Is the patient taking medications as prescribed? yes   Does the patient have transportation home? Yes   Transportation Available family or friend will provide   Dialysis Name and Scheduled days [facility] in [place] when at home and [facility] in [place] when at work    Does the patient receive services at the [facility]madin Clinic? No   Discharge Plan A Long-term acute care facility (LTAC)   Discharge Plan B Rehab   Patient/Family In Agreement With Plan unable to assess       Discharge/ My Health Packet Folder Given to patient/family:      yes      PCP:  Bulmaro Flores  Patient's Neurologist (has no PCP)       Pharmacy:      [id number] - [place], [place] - [address] AT [address]  [address]  [place] [address]  Phone: [phone] Fax: [phone]  ,    Emergency Contacts:  Extended Emergency Contact Information  Primary Emergency Contact: Jeni Moise   [place]  Mobile Phone: [phone]  Relation: Significant other  Secondary Emergency Contact: Geri Pope   [place]  Mobile Phone: [phone]  Relation: Mother      Insurance:    Payor: MEDICARE / Plan: MEDICARE PART A & B / Product Type: Government /     Kelli Goode RN, CCRN-K, CCM  Neuro-Critical Care   X [phone]

## 2017-09-26 NOTE — PROGRESS NOTES
[facility]  Vascular Neurology  Comprehensive Stroke Center  Progress Note    Assessment/Plan:     * Acute encephalopathy    Several days of encephalopathy with deterioration today with L gaze preference and LUE tonic flexion.  Most suspicious of seizures (R frontal focus).  Ischemic stroke(s) in differential as is encephalitis.  Not a tPA candidate as last known well several days ago.    Rec:  EEG monitoring  MRI/MRA brain and MRA neck findings most consistent with cerebritis, unlikely 2/2 stroke  Will sign off, please do not hesitate to contact with any further questions                 Past Medical History:   Diagnosis Date    ESRD on hemodialysis     HIV (human immunodeficiency virus infection)     Seizures      History reviewed. No pertinent surgical history.  Family History   Problem Relation Age of Onset    Family history unknown: Yes     Social History   Substance Use Topics    Smoking status: Never Smoker    Smokeless tobacco: Never Used    Alcohol use No     Review of patient's allergies indicates:  No Known Allergies  Medications: I have reviewed the current medication administration record.    Prescriptions Prior to Admission   Medication Sig Dispense Refill Last Dose    cloNIDine (CATAPRES) 0.1 MG tablet Take 0.1 mg by mouth 2 (two) times daily.       hydrALAZINE (APRESOLINE) 50 MG tablet Take 50 mg by mouth every 6 (six) hours.        lamivudine-zidovudine 150-300mg (COMBIVIR) 150-300 mg Tab Take 1 tablet by mouth every 12 (twelve) hours.       levetiracetam (KEPPRA) 1000 MG tablet Take 500 mg by mouth every Mon, Wed, Fri. Take after dialysis.          Review of Systems   Unable to perform ROS: Patient unresponsive     Objective:     Vital Signs (Most Recent):  Temp: 97.2 °F (36.2 °C) (09/26/17 1052)  Pulse: 100 (09/26/17 1135)  Resp: 17 (09/26/17 1135)  BP: (!) 139/96 (09/26/17 1135)  SpO2: 100 % (09/26/17 1135)    Vital Signs Range (Last 24H):  Temp:  [97 °F (36.1  °C)-98.8 °F (37.1 °C)]   Pulse:  []   Resp:  [14-26]   BP: (102-175)/()   SpO2:  [97 %-100 %]     Physical Exam  Constitutional: No distress.   HENT:   Head: Normocephalic and atraumatic.   Eyes: EOM are normal. Pupils are equal, round, and reactive to light.   Neck: Normal range of motion.   Cardiovascular: Normal heart sounds.    Pulmonary/Chest: He has wheezes.   Abdominal: Bowel sounds are normal.   Neurological: Finger-nose-finger test: Mild Ataxia      Neurological Exam:   LOC: does not follow requests  Language: No aphasia, Mute  Speech: No dysarthria, Mute  Orientation: Person, Place, Time, Mute  EOM (CN III, IV, VI): Gaze preference left  Patient paralyzed/intubated during my exam.      Stroke Scales    Laboratory:  CMP:     Recent Labs  Lab 09/25/17  1514 09/26/17  0145   CALCIUM 7.8* 7.7*   ALBUMIN 2.0* 1.8*   PROT 7.7  --     135*   K 4.8 4.5   CO2 23 21*   CL 98 98   BUN 53* 56*   CREATININE 13.3* 13.5*   ALKPHOS 112  --    ALT 49*  --    AST 87*  --    BILITOT 0.5  --      CBC:     Recent Labs  Lab 09/26/17  0145   WBC 4.42   RBC 2.65*   HGB 8.3*   HCT 25.1*   PLT 48*   MCV 95   MCH 31.3*   MCHC 33.1     Coagulation:     Recent Labs  Lab 09/26/17  0145   INR 1.0   APTT 29.1     Hgb A1C:     Recent Labs  Lab 09/25/17  0601   HGBA1C 5.0     TSH:     Recent Labs  Lab 09/25/17  1514   TSH 4.752*       Diagnostic Results:  CT brain without - no acute pathology; age indeterminate white matter lucencies      Fara Del Rosario MD  Comprehensive Stroke Center  Department of Vascular Neurology   [facility]

## 2017-09-27 PROBLEM — R23.9 ALTERATION IN SKIN INTEGRITY: Status: ACTIVE | Noted: 2017-09-27

## 2017-09-27 LAB
ALBUMIN SERPL BCP-MCNC: 1.8 G/DL
ALLENS TEST: ABNORMAL
AMYLASE SERPL-CCNC: 215 U/L
ANION GAP SERPL CALC-SCNC: 8 MMOL/L
ANISOCYTOSIS BLD QL SMEAR: SLIGHT
APTT BLDCRRT: 25.7 SEC
BACTERIA #/AREA URNS AUTO: NORMAL /HPF
BASOPHILS # BLD AUTO: 0 K/UL
BASOPHILS NFR BLD: 0 %
BILIRUB UR QL STRIP: NEGATIVE
BUN SERPL-MCNC: 14 MG/DL
CALCIUM SERPL-MCNC: 7.8 MG/DL
CHLORIDE SERPL-SCNC: 100 MMOL/L
CLARITY UR REFRACT.AUTO: ABNORMAL
CMV DNA SERPL NAA+PROBE-ACNC: NORMAL IU/ML
CO2 SERPL-SCNC: 29 MMOL/L
COLOR UR AUTO: YELLOW
CREAT SERPL-MCNC: 4.1 MG/DL
CRYPTOC AG CSF QL LA: NEGATIVE
DELSYS: ABNORMAL
DIFFERENTIAL METHOD: ABNORMAL
EBV DNA # BLD NAA+PROBE: NORMAL {COPIES}/ML
EBV DNA BY PCR: NORMAL
EOSINOPHIL # BLD AUTO: 0 K/UL
EOSINOPHIL NFR BLD: 0 %
ERYTHROCYTE [DISTWIDTH] IN BLOOD BY AUTOMATED COUNT: 16.3 %
ERYTHROCYTE [SEDIMENTATION RATE] IN BLOOD BY WESTERGREN METHOD: 15 MM/H
EST. GFR  (AFRICAN AMERICAN): 18 ML/MIN/1.73 M^2
EST. GFR  (NON AFRICAN AMERICAN): 15.5 ML/MIN/1.73 M^2
FIO2: 50
GLUCOSE SERPL-MCNC: 67 MG/DL
GLUCOSE UR QL STRIP: ABNORMAL
HCO3 UR-SCNC: 31.9 MMOL/L (ref 24–28)
HCT VFR BLD AUTO: 22.2 %
HGB BLD-MCNC: 7.4 G/DL
HGB UR QL STRIP: ABNORMAL
HYALINE CASTS UR QL AUTO: 0 /LPF
INR PPP: 1.1
KETONES UR QL STRIP: NEGATIVE
LEUKOCYTE ESTERASE UR QL STRIP: NEGATIVE
LIPASE SERPL-CCNC: 224 U/L
LOWER 95% CONFIDENCE INTERVAL: NORMAL
LYMPHOCYTES # BLD AUTO: 0.3 K/UL
LYMPHOCYTES NFR BLD: 3.8 %
MAGNESIUM SERPL-MCNC: 1.3 MG/DL
MCH RBC QN AUTO: 31.5 PG
MCHC RBC AUTO-ENTMCNC: 33.3 G/DL
MCV RBC AUTO: 95 FL
MICROSCOPIC COMMENT: NORMAL
MIN VOL: 6.4
MODE: ABNORMAL
MONOCYTES # BLD AUTO: 0.5 K/UL
MONOCYTES NFR BLD: 5.4 %
NEUTROPHILS # BLD AUTO: 7.6 K/UL
NEUTROPHILS NFR BLD: 90.8 %
NITRITE UR QL STRIP: NEGATIVE
PCO2 BLDA: 39.8 MMHG (ref 35–45)
PEEP: 5
PH SMN: 7.51 [PH] (ref 7.35–7.45)
PH UR STRIP: 7 [PH] (ref 5–8)
PHOSPHATE SERPL-MCNC: 2.8 MG/DL
PIP: 17
PLATELET # BLD AUTO: 77 K/UL
PLATELET BLD QL SMEAR: ABNORMAL
PMV BLD AUTO: 11.3 FL
PO2 BLDA: 213 MMHG (ref 80–100)
POC BE: 9 MMOL/L
POC SATURATED O2: 100 % (ref 95–100)
POC TCO2: 33 MMOL/L (ref 23–27)
POLYCHROMASIA BLD QL SMEAR: ABNORMAL
POTASSIUM SERPL-SCNC: 4 MMOL/L
PROT UR QL STRIP: ABNORMAL
PROTHROMBIN TIME: 11.2 SEC
RBC # BLD AUTO: 2.35 M/UL
RBC #/AREA URNS AUTO: 2 /HPF (ref 0–4)
SAMPLE: ABNORMAL
SITE: ABNORMAL
SODIUM SERPL-SCNC: 137 MMOL/L
SP GR UR STRIP: 1.02 (ref 1–1.03)
SP02: 100
T GONDII IGG SER QL IA: NORMAL
T GONDII IGG SERPL IA-ACNC: <5 IU/ML
UPPER 95% CONFIDENCE INTERVAL: NORMAL
URN SPEC COLLECT METH UR: ABNORMAL
UROBILINOGEN UR STRIP-ACNC: NEGATIVE EU/DL
VT: 450
WBC # BLD AUTO: 8.35 K/UL
WBC #/AREA URNS AUTO: 2 /HPF (ref 0–5)
WEST NILE VIRUS INTERPRETATION: NORMAL
WNV IGG SER QL IA: NEGATIVE
WNV IGM SER QL IA: NEGATIVE

## 2017-09-27 PROCEDURE — 87070 CULTURE OTHR SPECIMN AEROBIC: CPT

## 2017-09-27 PROCEDURE — 25000003 PHARM REV CODE 250: Performed by: PHYSICIAN ASSISTANT

## 2017-09-27 PROCEDURE — 63600175 PHARM REV CODE 636 W HCPCS: Performed by: NURSE PRACTITIONER

## 2017-09-27 PROCEDURE — 36620 INSERTION CATHETER ARTERY: CPT | Mod: ,,, | Performed by: PSYCHIATRY & NEUROLOGY

## 2017-09-27 PROCEDURE — 25000003 PHARM REV CODE 250

## 2017-09-27 PROCEDURE — 80069 RENAL FUNCTION PANEL: CPT

## 2017-09-27 PROCEDURE — 81001 URINALYSIS AUTO W/SCOPE: CPT

## 2017-09-27 PROCEDURE — 82150 ASSAY OF AMYLASE: CPT

## 2017-09-27 PROCEDURE — 85025 COMPLETE CBC W/AUTO DIFF WBC: CPT

## 2017-09-27 PROCEDURE — 63600175 PHARM REV CODE 636 W HCPCS: Performed by: PSYCHIATRY & NEUROLOGY

## 2017-09-27 PROCEDURE — 94761 N-INVAS EAR/PLS OXIMETRY MLT: CPT

## 2017-09-27 PROCEDURE — 63600175 PHARM REV CODE 636 W HCPCS: Performed by: INTERNAL MEDICINE

## 2017-09-27 PROCEDURE — 83735 ASSAY OF MAGNESIUM: CPT

## 2017-09-27 PROCEDURE — 63600175 PHARM REV CODE 636 W HCPCS: Performed by: PHYSICIAN ASSISTANT

## 2017-09-27 PROCEDURE — 85730 THROMBOPLASTIN TIME PARTIAL: CPT

## 2017-09-27 PROCEDURE — 25000003 PHARM REV CODE 250: Performed by: PSYCHIATRY & NEUROLOGY

## 2017-09-27 PROCEDURE — 82803 BLOOD GASES ANY COMBINATION: CPT

## 2017-09-27 PROCEDURE — 99291 CRITICAL CARE FIRST HOUR: CPT | Mod: 25,GC,, | Performed by: PSYCHIATRY & NEUROLOGY

## 2017-09-27 PROCEDURE — 25000003 PHARM REV CODE 250: Performed by: INTERNAL MEDICINE

## 2017-09-27 PROCEDURE — 95951 HC EEG MONITORING/VIDEO RECORD: CPT

## 2017-09-27 PROCEDURE — 95957 EEG DIGITAL ANALYSIS: CPT

## 2017-09-27 PROCEDURE — 85610 PROTHROMBIN TIME: CPT

## 2017-09-27 PROCEDURE — 27000221 HC OXYGEN, UP TO 24 HOURS

## 2017-09-27 PROCEDURE — 83690 ASSAY OF LIPASE: CPT

## 2017-09-27 PROCEDURE — 87205 SMEAR GRAM STAIN: CPT

## 2017-09-27 PROCEDURE — 99900026 HC AIRWAY MAINTENANCE (STAT)

## 2017-09-27 PROCEDURE — 87040 BLOOD CULTURE FOR BACTERIA: CPT | Mod: 59

## 2017-09-27 PROCEDURE — 99900035 HC TECH TIME PER 15 MIN (STAT)

## 2017-09-27 PROCEDURE — 95951 PR EEG MONITORING/VIDEORECORD: CPT | Mod: 26,,, | Performed by: PSYCHIATRY & NEUROLOGY

## 2017-09-27 PROCEDURE — 94003 VENT MGMT INPAT SUBQ DAY: CPT

## 2017-09-27 PROCEDURE — 36600 WITHDRAWAL OF ARTERIAL BLOOD: CPT

## 2017-09-27 PROCEDURE — 20000000 HC ICU ROOM

## 2017-09-27 PROCEDURE — 27200966 HC CLOSED SUCTION SYSTEM

## 2017-09-27 PROCEDURE — 99233 SBSQ HOSP IP/OBS HIGH 50: CPT | Mod: GC,,, | Performed by: INTERNAL MEDICINE

## 2017-09-27 RX ORDER — ACETAMINOPHEN 650 MG/20.3ML
LIQUID ORAL
Status: COMPLETED
Start: 2017-09-27 | End: 2017-09-27

## 2017-09-27 RX ORDER — FENTANYL CITRATE/PF 250MCG/5ML
100 SYRINGE (ML) INTRAVENOUS ONCE
Status: DISCONTINUED | OUTPATIENT
Start: 2017-09-27 | End: 2017-09-27

## 2017-09-27 RX ORDER — ACETAMINOPHEN 500 MG
500 TABLET ORAL DAILY PRN
Status: DISCONTINUED | OUTPATIENT
Start: 2017-09-27 | End: 2017-10-04

## 2017-09-27 RX ORDER — LEVETIRACETAM 5 MG/ML
500 INJECTION INTRAVASCULAR ONCE
Status: COMPLETED | OUTPATIENT
Start: 2017-09-27 | End: 2017-09-27

## 2017-09-27 RX ORDER — LEVETIRACETAM 100 MG/ML
500 SOLUTION ORAL 2 TIMES DAILY
Status: DISCONTINUED | OUTPATIENT
Start: 2017-09-27 | End: 2017-10-01

## 2017-09-27 RX ORDER — DIPHENHYDRAMINE HCL 25 MG
25 CAPSULE ORAL DAILY PRN
Status: DISCONTINUED | OUTPATIENT
Start: 2017-09-27 | End: 2017-09-29

## 2017-09-27 RX ORDER — FENTANYL CITRATE 50 UG/ML
100 INJECTION, SOLUTION INTRAMUSCULAR; INTRAVENOUS ONCE
Status: DISCONTINUED | OUTPATIENT
Start: 2017-09-27 | End: 2017-09-27

## 2017-09-27 RX ORDER — ACETAMINOPHEN 650 MG/20.3ML
650 LIQUID ORAL EVERY 6 HOURS PRN
Status: DISCONTINUED | OUTPATIENT
Start: 2017-09-27 | End: 2017-10-05 | Stop reason: HOSPADM

## 2017-09-27 RX ORDER — ACETAMINOPHEN 650 MG/20.3ML
650 LIQUID ORAL EVERY 6 HOURS PRN
Status: DISCONTINUED | OUTPATIENT
Start: 2017-09-27 | End: 2017-09-27

## 2017-09-27 RX ADMIN — LEVETIRACETAM 500 MG: 5 INJECTION INTRAVENOUS at 06:09

## 2017-09-27 RX ADMIN — ACYCLOVIR SODIUM 710 MG: 1000 INJECTION, SOLUTION INTRAVENOUS at 06:09

## 2017-09-27 RX ADMIN — LEVETIRACETAM 500 MG: 500 TABLET ORAL at 09:09

## 2017-09-27 RX ADMIN — LEVETIRACETAM 500 MG: 500 SOLUTION ORAL at 09:09

## 2017-09-27 RX ADMIN — HYDRALAZINE HYDROCHLORIDE 100 MG: 50 TABLET ORAL at 09:09

## 2017-09-27 RX ADMIN — CHLORHEXIDINE GLUCONATE 15 ML: 1.2 RINSE ORAL at 09:09

## 2017-09-27 RX ADMIN — AMLODIPINE BESYLATE 5 MG: 5 TABLET ORAL at 09:09

## 2017-09-27 RX ADMIN — ACETAMINOPHEN 650 MG: 650 SOLUTION ORAL at 04:09

## 2017-09-27 RX ADMIN — MEROPENEM AND SODIUM CHLORIDE 1 G: 1 INJECTION, SOLUTION INTRAVENOUS at 06:09

## 2017-09-27 RX ADMIN — MAGNESIUM SULFATE IN WATER 2 G: 40 INJECTION, SOLUTION INTRAVENOUS at 03:09

## 2017-09-27 RX ADMIN — ATORVASTATIN CALCIUM 10 MG: 10 TABLET, FILM COATED ORAL at 09:09

## 2017-09-27 RX ADMIN — AMPHOTERICIN B 200 MG: 50 INJECTION, POWDER, LYOPHILIZED, FOR SOLUTION INTRAVENOUS at 08:09

## 2017-09-27 RX ADMIN — FAMOTIDINE 20 MG: 20 TABLET, FILM COATED ORAL at 09:09

## 2017-09-27 RX ADMIN — DAPSONE 50 MG: 25 TABLET ORAL at 09:09

## 2017-09-27 NOTE — PROGRESS NOTES
Arterial line placed by MD at bedside. Consents in chart. Time out completed with E-ICU. Will continue to monitor.

## 2017-09-27 NOTE — HPI
Mr. Volodymyr Pope is a 52 year old male with AIDS (CD 49, not on HAART or prophylaxis), ESRD on HD , and seizure disorder. Consultation to hematology was made for thrombocytopenia. Patient was sent to [facility] ED on 9/15 from dialysis for evaluation of HTN and admitted for hypertensive urgency. Routine labs revealed platelet count of 19,000. He was also found to have altered mental status and had seizure during that hospitalization, which was then evaluated with MRI brain which revealed embolic strokes. He then developed a fever and was transferred to the ICU. He was treated with IV Vancomycin and Rocephin for presumed UTI . He initially had some improvement in mental status and was stepped down. After this, his mental status again worsened, and neurology sought an LP for further evaluation, but did not feel comfortable doing this without neurosurgery in house, given thrombocytopenia.     Mr. Pope was [place] [facility] [place]. He has been intubated for worsening mental status. He was transfused one unit platelets on 9/26 and LP was performed, which revealed protein of 65 and WBC 1. Hematology was consulted for further evaluation of thrombocytopenia. Infectious disease requests bone marrow evaluation to give insight as to underlying infectious etiology.

## 2017-09-27 NOTE — PROCEDURES
"Volodymyr Pope is a 52 y.o. male patient.    Temp: 99.1 °F (37.3 °C) (17 1505)  Pulse: 107 (17 1705)  Resp: 14 (17 170)  BP: 119/77 (17 1705)  SpO2: 100 % (17 170)  Weight: 78.8 kg (173 lb 11.6 oz) (17 0705)  Height: 6' (182.9 cm) (17)       Arterial Line  Date/Time: 2017 5:41 PM  Location procedure was performed: [facility] NEURO CRITICAL CARE  Performed by: JJ MCKEON  Authorized by: JJ MCKEON   Pre-op Diagnosis: encephalitis  Post-operative diagnosis: encephalitis  Consent Done: Yes  Consent: Verbal consent obtained.  Risks and benefits: risks, benefits and alternatives were discussed  Consent given by: spouse  Relevant documents: relevant documents present and verified  Required items: required blood products, implants, devices, and special equipment available  Patient identity confirmed: , MRN and name  Time out: Immediately prior to procedure a "time out" was called to verify the correct patient, procedure, equipment, support staff and site/side marked as required.  Preparation: Patient was prepped and draped in the usual sterile fashion.  Indications: multiple ABGs and respiratory failure  Location: left radial  Patient sedated: no  Jude's test normal: yes  Needle gauge: 20  Seldinger technique: Seldinger technique used  Number of attempts: 1  Complications: No  Post-procedure: dressing applied  Post-procedure CMS: normal  Patient tolerance: Patient tolerated the procedure well with no immediate complications          Jj Mckeon  2017    "

## 2017-09-27 NOTE — ASSESSMENT & PLAN NOTE
No baseline labs available  Patient presented to [facility] with platelet count 19,000  Platelet count is 77,000 on 9/27 s/p transfusion of one unit platelets on 9/26    Patient also has normocytic anemia    Differential diagnosis for thrombocytopenia in this patient includes sequelae of HIV, sepsis, hypersplenism, platelet clumping  Will review peripheral blood smear    Recommend checking reticulocyte count, Yvonne, spep, HIT antibody, and CT chest/abdomen/pelvis to evaluate for lymphoproliferative process and splenomegaly

## 2017-09-27 NOTE — PROGRESS NOTES
CHRIS Buenrostro notified of rhythmic facial twitching. EEG red button pushed. No new orders. Will continue to monitor.

## 2017-09-27 NOTE — PROGRESS NOTES
Notified Nancy,  of pt needing bite block per pt clenching down on ET tube and alarming clenching. Inline suction unable to be passed per pt clenching down on tongue and tube.     2043- Millie Cruz and Randa with Respiratory all at bedside attempting to suction and place bite block. Unable to with multiple attempts. Team notified..    2050- Andrews Smith NP and Ramon Daigle NP at bedside to assess pt and attempt to insert bite block.    2055- Per Andrews Smith NP give 100 mcg of Fentaynl IV now. Administered and bite block successfully administered.    Will continue to monitor...

## 2017-09-27 NOTE — SUBJECTIVE & OBJECTIVE
Oncology Treatment Plan:   [No treatment plan]    Medications:  Continuous Infusions:   Scheduled Meds:   acyclovir  10 mg/kg Intravenous Daily    amlodipine  5 mg Per NG tube Daily    amphotericin B liposome (AMBISOME) IVPB  3 mg/kg Intravenous Q24H    atorvastatin  10 mg Per NG tube QHS    chlorhexidine  15 mL Mouth/Throat BID    dapsone  50 mg Per NG tube Daily    famotidine  20 mg Per NG tube Daily    hydrALAZINE  100 mg Per NG tube Q8H    leucovorin  25 mg Per NG tube Weekly    levetiracetam oral soln  500 mg Per NG tube BID    meropenem (MERREM) IVPB  1 g Intravenous Q12H    pyrimethamine  50 mg Per NG tube Weekly     PRN Meds:sodium chloride, acetaminophen, acetaminophen, diphenhydrAMINE, hydrALAZINE, labetalol     Review of patient's allergies indicates:  No Known Allergies     Past Medical History:   Diagnosis Date    ESRD on hemodialysis     HIV (human immunodeficiency virus infection)     Seizures      Past Surgical History:   Procedure Laterality Date    LUMBAR PUNCTURE  9/26/2017          Family History     Family history is unknown by patient.        Social History Main Topics    Smoking status: Never Smoker    Smokeless tobacco: Never Used    Alcohol use No    Drug use: No    Sexual activity: Yes     Partners: Female       Review of Systems   Unable to perform ROS: Intubated     Objective:     Vital Signs (Most Recent):  Temp: 99.6 °F (37.6 °C) (09/27/17 1105)  Pulse: (!) 124 (09/27/17 1105)  Resp: (!) 22 (09/27/17 1105)  BP: 109/67 (09/27/17 1105)  SpO2: 100 % (09/27/17 1105) Vital Signs (24h Range):  Temp:  [98 °F (36.7 °C)-101 °F (38.3 °C)] 99.6 °F (37.6 °C)  Pulse:  [100-141] 124  Resp:  [15-28] 22  SpO2:  [94 %-100 %] 100 %  BP: (101-159)/() 109/67     Weight: 78.8 kg (173 lb 11.6 oz)  Body mass index is 23.56 kg/m².  Body surface area is 2 meters squared.      Intake/Output Summary (Last 24 hours) at 09/27/17 1145  Last data filed at 09/27/17 1105   Gross per 24 hour    Intake          2681.25 ml   Output             2763 ml   Net           -81.75 ml       Physical Exam   Constitutional: He appears well-developed.   Thin black male, intubated   HENT:   Head: Normocephalic and atraumatic.   Mucosa moist, drooling  ET tube in place   Eyes: Conjunctivae are normal. No scleral icterus.   Cardiovascular:   No murmur heard.  Tachycardic but regular   Pulmonary/Chest: No stridor. He has no wheezes.   Mechanical ventilation with harsh breath sounds   Abdominal: Soft. Bowel sounds are normal. He exhibits no distension. There is no guarding.   Musculoskeletal: He exhibits no edema.   Neurological:   Awake, somnolent but arousable to voice. Does not follow commands   Skin: Skin is warm and dry.       Significant Labs:   CBC:   Recent Labs  Lab 09/26/17  0145 09/26/17  1203 09/27/17  0349   WBC 4.42 5.57 8.35   HGB 8.3* 7.7* 7.4*   HCT 25.1* 23.5* 22.2*   PLT 48* 131* 77*    and CMP:   Recent Labs  Lab 09/25/17  1514 09/26/17  0145 09/26/17  2215 09/27/17  0349    135* 137 137   K 4.8 4.5 4.0 4.0   CL 98 98 100 100   CO2 23 21* 28 29   GLU 91 95 77 67*   BUN 53* 56* 28* 14   CREATININE 13.3* 13.5* 6.8* 4.1*   CALCIUM 7.8* 7.7* 7.6* 7.8*   PROT 7.7  --   --   --    ALBUMIN 2.0* 1.8* 1.7* 1.8*   BILITOT 0.5  --   --   --    ALKPHOS 112  --   --   --    AST 87*  --   --   --    ALT 49*  --   --   --    ANIONGAP 16 16 9 8   EGFRNONAA 3.7* 3.7* 8.4* 15.5*       Diagnostic Results:  I have reviewed all pertinent imaging results/findings within the past 24 hours.

## 2017-09-27 NOTE — ASSESSMENT & PLAN NOTE
Patient with worsening lethargy and encephalopathy since admission. MRI and clinical course not suggestive of bacterial meningitis, on meropenam. MRI with multifocal gyral diffusion restriction and T2/FLAIR hyperintensity involving the bilateral frontal and parietal lobes (particularly the left frontal eye field). Result suggestive of HIV encephalopathy/HIV associated infection, seizure activity, or multifocal infarcts. Also considered PML with out CSF samples to evaluate difficult to determine if infectious cause. MRI with no enhancing lesions or mass effect suggestive of cryptococcus with also negative antigen result or toxoplasmosis. If PML or HIV encephalopathy management is supportive. Cidofovir only recommended on patient that had been on HAART prior to PML. Now in the NICU as of 9/25, and intubated, due to worsening AMS.    - will follow Toxoplasma IgG, ANCA, [place] serology, CMV and RPR    - recommend discontinuing acyclovir as he does not have evidence of HSV.

## 2017-09-27 NOTE — PROGRESS NOTES
Consult received on patient's ulceration to penis, unknown etiology. Recommend applying clear barrier cream qshift and as needed. Nursing to continue care.       09/27/17 1056       Wound 09/27/17 1056 Ulceration lower penis   Date First Assessed/Time First Assessed: 09/27/17 1056   Pre-existing: Yes  Wound Type: Ulceration  Orientation: lower  Location: penis   Wound WDL ex  (unknown etiology)   Drainage Amount none   Drainage Characteristics/Odor no odor   Wound Base moist;pink   Periwound Area (healed scar tissue)   Wound Edges open   Wound Length (cm) 0.2   Wound Width (cm) 0.2   Depth (cm) 0   Non-staged Wound Description Partial thickness   Cleansed W/ sterile normal saline   Interventions barrier applied

## 2017-09-27 NOTE — PROGRESS NOTES
[facility]  Nephrology  Progress Note    Patient Name: Volodymyr Pope  MRN: [id number]  Admission Date: 9/23/2017  Hospital Length of Stay: 4 days  Attending Provider: Julio Granger MD   Primary Care Physician: Bulmaro Flores  Principal Problem:Acute encephalopathy    Subjective:     HPI: 53 yearold male with AIDS (CD 49 not on ART or PPx), ESRD on HD (M,W,F), and seizure disorder presents as a transfer from [facility] for Neurology eval and LP.  Patient presented to OSH on 9/15 from a dialysis center for HTN.  He was admitted for HTN urgency and routine workup revealed thrombocytopenia of 19,000.  His hospital course was complicated by AMS with lethargy.  MRI revealed multiple areas concerning for embolic strokes with seziure activity.  Neurology initially felt his AMS and resulting seizure was 2/2 to stroke and started him on Keppra, ASA and stain.  His mental status initially improved however he was found to be HTN with a fever and spent a brief time in the ICU.  Patient was started on ceftriaxone and Vanc given concern for UTI.  After stepping down to the floor patient was once again found to be lethargic and confused.  Neurology wished to pursue LP but due to platelet count of 52,000 they felt uncomfortable due to lack of Neurosurgery and this is the reason for transfer to [facility].  Other PMH significant for poorly controlled HTN and HIV/?AIDS with uncertain compliance.  Patient reportedly initially was Aox4, but when I see him he is not alert, not answering questions, arousable, but barely.     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    Prior to hospital admit in [place] he was receiving HD at [facility] in [place] on MWF through a R sided tunneled access line (reportedly w/o access problems and access placed in May), his nephrologist in [place] is Dr Hammond, treatment duration 4hrs, EDW: 78 kg, Normal UF 1-2 L, he does have residual renal function, uncertain how much, although noted he has made about 600ccs of urine overnight.    He reportedly received dialysis this past Friday, his lytes, acid/base and volume status are stable this morning, he is actually net negative 400ccs overnight, and appears to be below his EDW.      Interval History:   Had SLED overnight x 13 hours, net positive 700 ml/24h.  Electrolytes stable, remains on maintenance IVF.      Review of patient's allergies indicates:  No Known Allergies  Current Facility-Administered Medications   Medication Frequency    0.9%  NaCl infusion (for blood administration) Q24H PRN    0.9%  NaCl infusion Continuous    acetaminophen oral solution 650 mg Q6H PRN    acetaminophen tablet 500 mg Daily PRN    acyclovir (ZOVIRAX) 710 mg in dextrose 5 % 250 mL IVPB Daily    amlodipine tablet 5 mg Daily    amphotericin B liposome (AMBISOME) 200 mg in dextrose 5 % 200 mL IVPB Q24H    atorvastatin tablet 10 mg QHS    chlorhexidine 0.12 % solution 15 mL BID    dapsone tablet 50 mg Daily    diphenhydrAMINE capsule 25 mg Daily PRN    famotidine tablet 20 mg Daily    fentaNYL injection 100 mcg Once    fentaNYL injection 100 mcg Once    hydrALAZINE injection 10 mg Q4H PRN    hydrALAZINE tablet 100 mg Q8H    labetalol injection 10 mg Q4H PRN    leucovorin tablet 25 mg Weekly    levetiracetam tablet 500 mg Daily    meropenem-0.9% sodium chloride 1 g/50 mL IVPB Q12H    pyrimethamine tablet 50 mg Weekly       Objective:     Vital Signs (Most Recent):  Temp: (!) 100.6 °F (38.1 °C) (09/27/17 0705)  Pulse: (!) 114 (09/27/17 0743)  Resp: 16 (09/27/17 0743)  BP: 131/78 (09/27/17 0705)  SpO2:  100 % (09/27/17 0743)  O2 Device (Oxygen Therapy): ventilator (09/27/17 0743) Vital Signs (24h Range):  Temp:  [97.2 °F (36.2 °C)-101 °F (38.3 °C)] 100.6 °F (38.1 °C)  Pulse:  [] 114  Resp:  [15-28] 16  SpO2:  [94 %-100 %] 100 %  BP: (116-159)/() 131/78     Weight: 78.8 kg (173 lb 11.6 oz) (09/27/17 0705)  Body mass index is 23.56 kg/m².  Body surface area is 2 meters squared.    I/O last 3 completed shifts:  In: 5275.4 [I.V.:2892.5; Blood:732.9; NG/GT:400; IV Piggyback:1250]  Out: 2763 [Urine:100; Other:2663]    Physical Exam   Constitutional: He appears well-developed and well-nourished.   HENT:   Conjunctival edema      Eyes: Pupils are equal, round, and reactive to light.   Neck: Neck supple.   Cardiovascular: Normal rate, regular rhythm and normal heart sounds.  Exam reveals no friction rub.    No murmur heard.  Pulmonary/Chest: Effort normal and breath sounds normal. He has no wheezes. He has no rales.   intubated   Abdominal: Soft. Bowel sounds are normal. He exhibits no distension. There is no tenderness. There is no guarding.   Musculoskeletal: He exhibits no edema or deformity.   Neurological:   Opens eyes to voice.  Does not follow commands.   Skin: Skin is dry. No rash noted. No erythema.       Significant Labs:  ABGs:   Recent Labs  Lab 09/27/17  0553   PH 7.511*   PCO2 39.8   HCO3 31.9*   POCSATURATED 100   BE 9     CBC:   Recent Labs  Lab 09/26/17  1203 09/27/17  0349   WBC 5.57 8.35   RBC 2.47* 2.35*   HGB 7.7* 7.4*   HCT 23.5* 22.2*   *  --    MCV 95 95   MCH 31.2* 31.5*   MCHC 32.8 33.3     CMP:   Recent Labs  Lab 09/25/17  1514  09/27/17  0349   GLU 91  < > 67*   CALCIUM 7.8*  < > 7.8*   ALBUMIN 2.0*  < > 1.8*   PROT 7.7  --   --      < > 137   K 4.8  < > 4.0   CO2 23  < > 29   CL 98  < > 100   BUN 53*  < > 14   CREATININE 13.3*  < > 4.1*   ALKPHOS 112  --   --    ALT 49*  --   --    AST 87*  --   --    BILITOT 0.5  --   --    < > = values in this interval not displayed.        Assessment/Plan:     ESRD (end stage renal disease)    -Patient is ESRD on HD since May 2017? Underlying etiology uncertain, ?HIV nephropathy, reportedly last received session on Friday.   -MRI concern for cerebritis or other infection. ID following, LP performed.  -SLED overnight x 13 hours.  Net positive 700 ml/24h.  -electrolytes stable this morning, chest x-ray stable.  -Recommend discontinuing maintenance IVF.  -no need for RRT today.  Will reassess in AM.            Andrews Garcia NP  Nephrology  [facility]  Pager:  [phone]

## 2017-09-27 NOTE — ASSESSMENT & PLAN NOTE
-Patient is ESRD on HD since May 2017? Underlying etiology uncertain, ?HIV nephropathy, reportedly last received session on Friday.   -MRI concern for cerebritis or other infection. ID following, LP performed.  -SLED overnight x 13 hours.  Net positive 700 ml/24h.  -electrolytes stable this morning, chest x-ray stable.  -Recommend discontinuing maintenance IVF.  -no need for RRT today.  Will reassess in AM.

## 2017-09-27 NOTE — PROGRESS NOTES
[facility]  Neurocritical Care  Progress Note    Admit Date: 9/23/2017  Service Date: 09/27/2017  Length of Stay: 4    Subjective:     Chief Complaint: Acute encephalopathy    History of Present Illness: 52 y.o. man with HIV/AIDS admitted from [facility] with acute encephalopathy.  Infectious work-up, EEG planned.  Primary reason for transfer was for LP - once this is obtained, will discuss possible return to sending facility for ongoing treatment.  The patient became unresponsive overnight.     Active Hospital Problems          *Acute encephalopathy [G93.40]                    Yes          AIDS [B20]                  Yes          HTN (hypertension) [I10]                    Yes          ESRD (end stage renal disease) [N18.6]                   Yes          Seizure [R56.9]                       Yes          Thrombocytopenia [D69.6]                 Yes     Resolved Hospital Problems           Diagnosis        Date Resolved            POA      Hospital Course: 9/25: The patient became unresponsive overnight. Patient CT head showed no acute strokes. Pending MRI/MRA. BS was 90. VS within normal . O2 sats 93%.     Interval History:  No acute adverse events overnight    Review of Systems    Unable to obtain a complete ROS due to level of consciousness.  Objective:     Vitals:  Temp: 99.6 °F (37.6 °C) (09/27/17 1105)  Pulse: 107 (09/27/17 1539)  Resp: 14 (09/27/17 1539)  BP: 112/70 (09/27/17 1405)  SpO2: 100 % (09/27/17 1539)    Temp:  [98.8 °F (37.1 °C)-101 °F (38.3 °C)] 99.6 °F (37.6 °C)  Pulse:  [103-141] 107  Resp:  [3-28] 14  SpO2:  [94 %-100 %] 100 %  BP: (101-159)/() 112/70         Vent Mode: A/C  Oxygen Concentration (%):  [50] 50  Resp Rate Total:  [14 br/min-33 br/min] 33 br/min  Vt Set:  [450 mL] 450 mL  PEEP/CPAP:  [5 cmH20] 5 cmH20  Pressure Support:  [0 cmH20] 0 cmH20  Mean Airway Pressure:  [7.8 cmH20-15 cmH20] 12 cmH20    09/26 0701 - 09/27 0700  In: 3495.4 [I.V.:1932.5]  Out: 1097  [Urine:100]    Physical Exam   Constitutional: He appears well-developed.   HENT:   Head: Normocephalic.   Eyes: Pupils are equal, round, and reactive to light.   Cardiovascular: Normal rate and regular rhythm.    Pulmonary/Chest:   Intubated   Abdominal: Soft.   Neurological: GCS eye subscore is 1. GCS verbal subscore is 1. GCS motor subscore is 2.   PERRL  Corneal intact BL  OC reflex intact  Gag/cough intact  Breathing over vent  Triple flexion BL LE to noxious stim  De-cerebrate posturing BL UE to noxious stim   Vitals reviewed.      Medications:  Continuous Scheduled    acyclovir 10 mg/kg Daily   amlodipine 5 mg Daily   amphotericin B liposome (AMBISOME) IVPB 3 mg/kg Q24H   atorvastatin 10 mg QHS   chlorhexidine 15 mL BID   dapsone 50 mg Daily   famotidine 20 mg Daily   hydrALAZINE 100 mg Q8H   leucovorin 25 mg Weekly   levetiracetam oral soln 500 mg BID   meropenem (MERREM) IVPB 1 g Q12H   pyrimethamine 50 mg Weekly   PRN    sodium chloride  Q24H PRN   acetaminophen 650 mg Q6H PRN   acetaminophen 500 mg Daily PRN   diphenhydrAMINE 25 mg Daily PRN   hydrALAZINE 10 mg Q4H PRN   labetalol 10 mg Q4H PRN       Pulse: 107 (09/27/17 1539)  Resp: 14 (09/27/17 1539)  BP: 112/70 (09/27/17 1405)  SpO2: 100 % (09/27/17 1539)           Vent Mode: A/C  Oxygen Concentration (%):  [50] 50  Resp Rate Total:  [14 br/min-33 br/min] 33 br/min  Vt Set:  [450 mL] 450 mL  PEEP/CPAP:  [5 cmH20] 5 cmH20  Pressure Support:  [0 cmH20] 0 cmH20  Mean Airway Pressure:  [7.8 cmH20-15 cmH20] 12 cmH20    09/26 0701 - 09/27 0700  In: 3495.4 [I.V.:1932.5]  Out: 2763 [Urine:100]       4  09/26 0701 - 09/27 0700  In: 3495.4 [I.V.:1932.5]  Out: 2763 [Urine:100]     Recent Labs  Lab 09/27/17  0553   PH 7.511*   PCO2 39.8   PO2 213*   BE 9       Recent Labs  Lab 09/27/17  0041 09/27/17  0349   NA  --  137   K  --  4.0   CL  --  100   CO2  --  29   BUN  --  14   CREATININE  --  4.1*   CALCIUM  --  7.8*   MG 1.3*  --    PHOS  --  2.8       Recent  Labs  Lab 09/27/17  0349   WBC 8.35   HGB 7.4*   PLT 77*   INR 1.1   APTT 25.7       Recent Labs  Lab 09/27/17  0349   ALBUMIN 1.8*     Recent Labs      09/25/17   0545  09/25/17   1110   POCTGLUCOSE  86  90     Continuous Scheduled    acyclovir 10 mg/kg Daily   amlodipine 5 mg Daily   amphotericin B liposome (AMBISOME) IVPB 3 mg/kg Q24H   atorvastatin 10 mg QHS   chlorhexidine 15 mL BID   dapsone 50 mg Daily   famotidine 20 mg Daily   hydrALAZINE 100 mg Q8H   leucovorin 25 mg Weekly   levetiracetam oral soln 500 mg BID   meropenem (MERREM) IVPB 1 g Q12H   pyrimethamine 50 mg Weekly   PRN    sodium chloride  Q24H PRN   acetaminophen 650 mg Q6H PRN   acetaminophen 500 mg Daily PRN   diphenhydrAMINE 25 mg Daily PRN   hydrALAZINE 10 mg Q4H PRN   labetalol 10 mg Q4H PRN           Lines/Drains/Airways     Central Venous Catheter Line                 Tunneled Central Line Insertion/Assessment - Double Lumen  09/23/17 1100 right subclavian 4 days          Drain                 NG/OG Tube 09/25/17 1134 orogastric Center mouth 2 days    Male External Urinary Catheter 09/25/17 1345 Medium 2 days          Airway                 Airway - Non-Surgical 09/25/17 1129 Endotracheal Tube 2 days          Peripheral Intravenous Line                 Peripheral IV - Single Lumen 09/25/17 1137 Right Wrist 2 days         Peripheral IV - Single Lumen 09/26/17 1113 Right Forearm 1 day                        Assessment/Plan:     Neuro   * Acute encephalopathy    MRI with evidence of cerebritis  Most likely infectious in etiology  Continue broad spec abx  Antifungal coverage with Ampho  LP studies pending; NGTD, 1 WBC, protein 65  Continue HIV PPx   Greatly appreciate ID input - continue to discuss care  Continue Keppra - discuss EEG with epilepsy        Seizure    Continue Keppra  F/u EEG        Cardiac/Vascular   HTN (hypertension)    Low dose norvasc        Renal/   ESRD (end stage renal disease)    Renal following - appreciated  Fistula  in place  Consider placing HD cath for possible SLED        ID   AIDS    Continue ppx  Holding Bactrim in anuric renal failure in the setting of hyperkalemia and thrombocytopenia - continue leucovorin, dapsone,and pyrimethamine - greatly appreciate ID input          Hematology   Thrombocytopenia    Continue to monitor              Prophylaxis:  Venous Thromboembolism: mechanical chemical  Stress Ulcer: H2B  Ventilator Pneumonia: yes     Activity Orders          Bed rest starting at 09/26 1245        Full Code    Jj Staples MD  Neurocritical Care  [facility]

## 2017-09-27 NOTE — CONSULTS
[facility]  Hematology/Oncology  Consult Note    Patient Name: Volodymyr Pope  MRN: [id number]  Admission Date: 9/23/2017  Hospital Length of Stay: 4 days  Code Status: Full Code   Attending Provider: Julio Granger MD  Consulting Provider: Elena Figueroa DO  Primary Care Physician: Bulmaro Flores  Principal Problem:Acute encephalopathy    Inpatient consult to Hematology/Oncology  Consult performed by: ELENA FIGUEROA  Consult ordered by: TOMMIE MCKEON  Reason for consult: thrombocytopenia  Assessment/Recommendations: No baseline labs available  Patient presented to [facility] with platelet count 19,000  Platelet count is 77,000 on 9/27 s/p transfusion of one unit platelets on 9/26    Patient also has normocytic anemia    Differential diagnosis for thrombocytopenia in this patient includes sequelae of HIV, sepsis, hypersplenism, platelet clumping  Will review peripheral blood smear    Recommend checking reticulocyte count, Yvonne, spep, HIT antibody, and CT chest/abdomen/pelvis to evaluate for lymphoproliferative process and splenomegaly        Subjective:     HPI:  Mr. Volodymyr Pope is a 52 year old male with AIDS (CD 49, not on HAART or prophylaxis), ESRD on HD , and seizure disorder. Consultation to hematology was made for thrombocytopenia. Patient was sent to [facility] ED on 9/15 from dialysis for evaluation of HTN and admitted for hypertensive urgency. Routine labs revealed platelet count of 19,000. He was also found to have altered mental status and had seizure during that hospitalization, which was then evaluated with MRI brain which revealed embolic strokes. He then developed a fever and was transferred to the ICU. He was treated with IV Vancomycin and Rocephin for presumed UTI . He initially had some improvement in mental status and was stepped down. After this, his mental status again worsened, and neurology sought an LP for further evaluation, but did not feel comfortable doing  this without neurosurgery in house, given thrombocytopenia.     Mr. Pope was transferred to [facility] for LP on 9/24. He has been intubated for worsening mental status. He was transfused one unit platelets on 9/26 and LP was performed, which revealed protein of 65 and WBC 1. Hematology was consulted for further evaluation of thrombocytopenia. Infectious disease requests bone marrow evaluation to give insight as to underlying infectious etiology.     Oncology Treatment Plan:   [No treatment plan]    Medications:  Continuous Infusions:   Scheduled Meds:   acyclovir  10 mg/kg Intravenous Daily    amlodipine  5 mg Per NG tube Daily    amphotericin B liposome (AMBISOME) IVPB  3 mg/kg Intravenous Q24H    atorvastatin  10 mg Per NG tube QHS    chlorhexidine  15 mL Mouth/Throat BID    dapsone  50 mg Per NG tube Daily    famotidine  20 mg Per NG tube Daily    hydrALAZINE  100 mg Per NG tube Q8H    leucovorin  25 mg Per NG tube Weekly    levetiracetam oral soln  500 mg Per NG tube BID    meropenem (MERREM) IVPB  1 g Intravenous Q12H    pyrimethamine  50 mg Per NG tube Weekly     PRN Meds:sodium chloride, acetaminophen, acetaminophen, diphenhydrAMINE, hydrALAZINE, labetalol     Review of patient's allergies indicates:  No Known Allergies     Past Medical History:   Diagnosis Date    ESRD on hemodialysis     HIV (human immunodeficiency virus infection)     Seizures      Past Surgical History:   Procedure Laterality Date    LUMBAR PUNCTURE  9/26/2017          Family History     Family history is unknown by patient.        Social History Main Topics    Smoking status: Never Smoker    Smokeless tobacco: Never Used    Alcohol use No    Drug use: No    Sexual activity: Yes     Partners: Female       Review of Systems   Unable to perform ROS: Intubated     Objective:     Vital Signs (Most Recent):  Temp: 99.6 °F (37.6 °C) (09/27/17 1105)  Pulse: (!) 124 (09/27/17 1105)  Resp: (!) 22 (09/27/17 1105)  BP: 109/67  (09/27/17 1105)  SpO2: 100 % (09/27/17 1105) Vital Signs (24h Range):  Temp:  [98 °F (36.7 °C)-101 °F (38.3 °C)] 99.6 °F (37.6 °C)  Pulse:  [100-141] 124  Resp:  [15-28] 22  SpO2:  [94 %-100 %] 100 %  BP: (101-159)/() 109/67     Weight: 78.8 kg (173 lb 11.6 oz)  Body mass index is 23.56 kg/m².  Body surface area is 2 meters squared.      Intake/Output Summary (Last 24 hours) at 09/27/17 1145  Last data filed at 09/27/17 1105   Gross per 24 hour   Intake          2681.25 ml   Output             2763 ml   Net           -81.75 ml       Physical Exam   Constitutional: He appears well-developed.   Thin black male, intubated   HENT:   Head: Normocephalic and atraumatic.   Mucosa moist, drooling  ET tube in place   Eyes: Conjunctivae are normal. No scleral icterus.   Cardiovascular:   No murmur heard.  Tachycardic but regular   Pulmonary/Chest: No stridor. He has no wheezes.   Mechanical ventilation with harsh breath sounds   Abdominal: Soft. Bowel sounds are normal. He exhibits no distension. There is no guarding.   Musculoskeletal: He exhibits no edema.   Neurological:   Awake, somnolent but arousable to voice. Does not follow commands   Skin: Skin is warm and dry.       Significant Labs:   CBC:   Recent Labs  Lab 09/26/17  0145 09/26/17  1203 09/27/17  0349   WBC 4.42 5.57 8.35   HGB 8.3* 7.7* 7.4*   HCT 25.1* 23.5* 22.2*   PLT 48* 131* 77*    and CMP:   Recent Labs  Lab 09/25/17  1514 09/26/17  0145 09/26/17  2215 09/27/17  0349    135* 137 137   K 4.8 4.5 4.0 4.0   CL 98 98 100 100   CO2 23 21* 28 29   GLU 91 95 77 67*   BUN 53* 56* 28* 14   CREATININE 13.3* 13.5* 6.8* 4.1*   CALCIUM 7.8* 7.7* 7.6* 7.8*   PROT 7.7  --   --   --    ALBUMIN 2.0* 1.8* 1.7* 1.8*   BILITOT 0.5  --   --   --    ALKPHOS 112  --   --   --    AST 87*  --   --   --    ALT 49*  --   --   --    ANIONGAP 16 16 9 8   EGFRNONAA 3.7* 3.7* 8.4* 15.5*       Diagnostic Results:  I have reviewed all pertinent imaging results/findings within  the past 24 hours.    Assessment/Plan:     Thrombocytopenia    No baseline labs available  Patient presented to [facility] with platelet count 19,000  Platelet count is 77,000 on 9/27 s/p transfusion of one unit platelets on 9/26    Patient also has normocytic anemia    Differential diagnosis for thrombocytopenia in this patient includes sequelae of HIV, sepsis, hypersplenism, platelet clumping  Will review peripheral blood smear    Recommend checking reticulocyte count, Yvonne, spep, HIT antibody, and CT chest/abdomen/pelvis to evaluate for lymphoproliferative process and splenomegaly            Thank you for your consult. I will follow-up with patient. Please contact us if you have any additional questions.    Elena Winchester, DO  Hematology/Oncology  [facility]

## 2017-09-27 NOTE — SUBJECTIVE & OBJECTIVE
Interval History:  No acute adverse events overnight    Review of Systems    Unable to obtain a complete ROS due to level of consciousness.  Objective:     Vitals:  Temp: 99.6 °F (37.6 °C) (09/27/17 1105)  Pulse: 107 (09/27/17 1539)  Resp: 14 (09/27/17 1539)  BP: 112/70 (09/27/17 1405)  SpO2: 100 % (09/27/17 1539)    Temp:  [98.8 °F (37.1 °C)-101 °F (38.3 °C)] 99.6 °F (37.6 °C)  Pulse:  [103-141] 107  Resp:  [3-28] 14  SpO2:  [94 %-100 %] 100 %  BP: (101-159)/() 112/70         Vent Mode: A/C  Oxygen Concentration (%):  [50] 50  Resp Rate Total:  [14 br/min-33 br/min] 33 br/min  Vt Set:  [450 mL] 450 mL  PEEP/CPAP:  [5 cmH20] 5 cmH20  Pressure Support:  [0 cmH20] 0 cmH20  Mean Airway Pressure:  [7.8 cmH20-15 cmH20] 12 cmH20    09/26 0701 - 09/27 0700  In: 3495.4 [I.V.:1932.5]  Out: 2763 [Urine:100]    Physical Exam   Constitutional: He appears well-developed.   HENT:   Head: Normocephalic.   Eyes: Pupils are equal, round, and reactive to light.   Cardiovascular: Normal rate and regular rhythm.    Pulmonary/Chest:   Intubated   Abdominal: Soft.   Neurological: GCS eye subscore is 1. GCS verbal subscore is 1. GCS motor subscore is 2.   PERRL  Corneal intact BL  OC reflex intact  Gag/cough intact  Breathing over vent  Triple flexion BL LE to noxious stim  De-cerebrate posturing BL UE to noxious stim   Vitals reviewed.      Medications:  Continuous Scheduled    acyclovir 10 mg/kg Daily   amlodipine 5 mg Daily   amphotericin B liposome (AMBISOME) IVPB 3 mg/kg Q24H   atorvastatin 10 mg QHS   chlorhexidine 15 mL BID   dapsone 50 mg Daily   famotidine 20 mg Daily   hydrALAZINE 100 mg Q8H   leucovorin 25 mg Weekly   levetiracetam oral soln 500 mg BID   meropenem (MERREM) IVPB 1 g Q12H   pyrimethamine 50 mg Weekly   PRN    sodium chloride  Q24H PRN   acetaminophen 650 mg Q6H PRN   acetaminophen 500 mg Daily PRN   diphenhydrAMINE 25 mg Daily PRN   hydrALAZINE 10 mg Q4H PRN   labetalol 10 mg Q4H PRN       Pulse: 107  (09/27/17 1539)  Resp: 14 (09/27/17 1539)  BP: 112/70 (09/27/17 1405)  SpO2: 100 % (09/27/17 1539)           Vent Mode: A/C  Oxygen Concentration (%):  [50] 50  Resp Rate Total:  [14 br/min-33 br/min] 33 br/min  Vt Set:  [450 mL] 450 mL  PEEP/CPAP:  [5 cmH20] 5 cmH20  Pressure Support:  [0 cmH20] 0 cmH20  Mean Airway Pressure:  [7.8 cmH20-15 cmH20] 12 cmH20    09/26 0701 - 09/27 0700  In: 3495.4 [I.V.:1932.5]  Out: 2763 [Urine:100]       4  09/26 0701 - 09/27 0700  In: 3495.4 [I.V.:1932.5]  Out: 2763 [Urine:100]     Recent Labs  Lab 09/27/17  0553   PH 7.511*   PCO2 39.8   PO2 213*   BE 9       Recent Labs  Lab 09/27/17  0041 09/27/17  0349   NA  --  137   K  --  4.0   CL  --  100   CO2  --  29   BUN  --  14   CREATININE  --  4.1*   CALCIUM  --  7.8*   MG 1.3*  --    PHOS  --  2.8       Recent Labs  Lab 09/27/17 0349   WBC 8.35   HGB 7.4*   PLT 77*   INR 1.1   APTT 25.7       Recent Labs  Lab 09/27/17  0349   ALBUMIN 1.8*     Recent Labs      09/25/17   0545  09/25/17   1110   POCTGLUCOSE  86  90     Continuous Scheduled    acyclovir 10 mg/kg Daily   amlodipine 5 mg Daily   amphotericin B liposome (AMBISOME) IVPB 3 mg/kg Q24H   atorvastatin 10 mg QHS   chlorhexidine 15 mL BID   dapsone 50 mg Daily   famotidine 20 mg Daily   hydrALAZINE 100 mg Q8H   leucovorin 25 mg Weekly   levetiracetam oral soln 500 mg BID   meropenem (MERREM) IVPB 1 g Q12H   pyrimethamine 50 mg Weekly   PRN    sodium chloride  Q24H PRN   acetaminophen 650 mg Q6H PRN   acetaminophen 500 mg Daily PRN   diphenhydrAMINE 25 mg Daily PRN   hydrALAZINE 10 mg Q4H PRN   labetalol 10 mg Q4H PRN           Lines/Drains/Airways     Central Venous Catheter Line                 Tunneled Central Line Insertion/Assessment - Double Lumen  09/23/17 1100 right subclavian 4 days          Drain                 NG/OG Tube 09/25/17 1134 orogastric Center mouth 2 days    Male External Urinary Catheter 09/25/17 1345 Medium 2 days          Airway                 Airway -  Non-Surgical 09/25/17 1129 Endotracheal Tube 2 days          Peripheral Intravenous Line                 Peripheral IV - Single Lumen 09/25/17 1137 Right Wrist 2 days         Peripheral IV - Single Lumen 09/26/17 1113 Right Forearm 1 day

## 2017-09-27 NOTE — PROGRESS NOTES
NCC at bedside. Pt more alert and pulling at lines. MD states ok to place restraints. Will continue to monitor.

## 2017-09-27 NOTE — ASSESSMENT & PLAN NOTE
MRI with evidence of cerebritis  Most likely infectious in etiology  Continue broad spec abx  Antifungal coverage with Ampho  LP studies pending; NGTD, 1 WBC, protein 65  Continue HIV PPx   Greatly appreciate ID input - continue to discuss care  Continue Keppra - discuss EEG with epilepsy

## 2017-09-27 NOTE — PROGRESS NOTES
Spoke with [facility] nurse regarding pt's Magnesium level of 1.3. Per orders for replacements go by orders placed under prn. Will continue to monitor..

## 2017-09-27 NOTE — ASSESSMENT & PLAN NOTE
Case of 53 y/o male with PMHX of HIV currently AIDS not treated at this time with no follow up in out patient to our knowledge. Patient CD4 count from OSH 49. CD4 count on 9/23 18.4. Which requires PCP and Toxoplasma and MAC prophylaxis.     - Would recommend to adjust coverage:  discontinue dapsone, leucovorine and pyrimethamine and start Bactrim DS once daily PO to cover PCP and Toxoplasma prophylaxis.   - ceftriaxone was changed to meropenem (high dose) for broad spectrum and CNS coverage  - HAART not indicated until we determine status of current lethargic state and rule out infectious causes. Begin HAART can cause an aggressive immune response also known as IRIS. It can also worcen HIV encephalopathy and PML that is possible etiology as suggested by MRI results.   - RPR (9/23) nonreactive, HIV RNA quant (9/26) pending.  - recommend Azythromycin for MAC prophylaxis   - Previously recommended Hepatitis Panel. Histoplasmosis ag pending.  CT chest/abdo pending.  - EBV PCR; CMV PCR; JCV IgG all pending.  FTA reactive minimal.  - on amphotericin

## 2017-09-27 NOTE — SUBJECTIVE & OBJECTIVE
Past Medical History:   Diagnosis Date    ESRD on hemodialysis     HIV (human immunodeficiency virus infection)     Seizures        Past Surgical History:   Procedure Laterality Date    LUMBAR PUNCTURE  9/26/2017            Review of patient's allergies indicates:  No Known Allergies    Medications:  Prescriptions Prior to Admission   Medication Sig    cloNIDine (CATAPRES) 0.1 MG tablet Take 0.1 mg by mouth 2 (two) times daily.    hydrALAZINE (APRESOLINE) 50 MG tablet Take 50 mg by mouth every 6 (six) hours.     lamivudine-zidovudine 150-300mg (COMBIVIR) 150-300 mg Tab Take 1 tablet by mouth every 12 (twelve) hours.    levetiracetam (KEPPRA) 1000 MG tablet Take 500 mg by mouth every Mon, Wed, Fri. Take after dialysis.     Antibiotics     Start     Stop Route Frequency Ordered    09/26/17 0900  dapsone tablet 50 mg      -- PER NG TUBE Daily 09/25/17 1743    09/26/17 0600  meropenem-0.9% sodium chloride 1 g/50 mL IVPB      -- IV Every 12 hours (non-standard times) 09/26/17 0449        Antifungals     Start     Stop Route Frequency Ordered    09/27/17 0830  amphotericin B liposome (AMBISOME) 200 mg in dextrose 5 % 200 mL IVPB  (amphotericin B liposome (AMBISOME) IVPB panel)      -- IV Every 24 hours (non-standard times) 09/27/17 0648        Antivirals         Stop Route Frequency     acyclovir (ZOVIRAX) injection      -- IV Daily             There is no immunization history on file for this patient.    Family History     Family history is unknown by patient.        Social History     Social History    Marital status:      Spouse name: N/A    Number of children: N/A    Years of education: N/A     Social History Main Topics    Smoking status: Never Smoker    Smokeless tobacco: Never Used    Alcohol use No    Drug use: No    Sexual activity: Yes     Partners: Female     Other Topics Concern    None     Social History Narrative    None     Review of Systems   Unable to perform ROS: Intubated      Objective:     Vital Signs (Most Recent):  Temp: (!) 100.6 °F (38.1 °C) (09/27/17 0705)  Pulse: (!) 111 (09/27/17 1005)  Resp: 17 (09/27/17 1005)  BP: 121/81 (09/27/17 1005)  SpO2: 100 % (09/27/17 1005) Vital Signs (24h Range):  Temp:  [98 °F (36.7 °C)-101 °F (38.3 °C)] 100.6 °F (38.1 °C)  Pulse:  [100-141] 111  Resp:  [15-28] 17  SpO2:  [94 %-100 %] 100 %  BP: (101-159)/() 121/81     Weight: 78.8 kg (173 lb 11.6 oz)  Body mass index is 23.56 kg/m².    Estimated Creatinine Clearance: 22.9 mL/min (based on SCr of 4.1 mg/dL (H)).    Physical Exam   Constitutional: He appears well-developed and well-nourished.   Eyes: Pupils are equal, round, and reactive to light.   Neck: Neck supple.   Cardiovascular: Regular rhythm and normal heart sounds.  Exam reveals no friction rub.    No murmur heard.  tachycardic   Pulmonary/Chest: Effort normal and breath sounds normal. He has no wheezes. He has no rales.   intubated   Abdominal: Soft. Bowel sounds are normal. He exhibits no distension. There is no tenderness. There is no guarding.   Musculoskeletal: He exhibits no edema or deformity.   Skin: Skin is dry. No rash noted. No erythema.       Significant Labs:   Blood Culture:     Recent Labs  Lab 09/23/17 2039 09/23/17 2122 09/24/17  0013   LABBLOO No Growth to date  No Growth to date  No Growth to date  No Growth to date No Growth to date  No Growth to date  No Growth to date  No Growth to date No Growth to date  No Growth to date  No Growth to date  No Growth to date     BMP:     Recent Labs  Lab 09/27/17  0041 09/27/17  0349   GLU  --  67*   NA  --  137   K  --  4.0   CL  --  100   CO2  --  29   BUN  --  14   CREATININE  --  4.1*   CALCIUM  --  7.8*   MG 1.3*  --      C4 Count: No results for input(s): C4 in the last 48 hours.  CBC:     Recent Labs  Lab 09/26/17  0145 09/26/17  1203 09/27/17  0349   WBC 4.42 5.57 8.35   HGB 8.3* 7.7* 7.4*   HCT 25.1* 23.5* 22.2*   PLT 48* 131* 77*       Significant  Imaging: I have reviewed all pertinent imaging results/findings within the past 24 hours.

## 2017-09-27 NOTE — ASSESSMENT & PLAN NOTE
Continue ppx  Holding Bactrim in anuric renal failure in the setting of hyperkalemia and thrombocytopenia - continue leucovorin, dapsone,and pyrimethamine - greatly appreciate ID input

## 2017-09-27 NOTE — SUBJECTIVE & OBJECTIVE
Interval History:   Had SLED overnight x 13 hours, net positive 700 ml/24h.  Electrolytes stable, remains on maintenance IVF.      Review of patient's allergies indicates:  No Known Allergies  Current Facility-Administered Medications   Medication Frequency    0.9%  NaCl infusion (for blood administration) Q24H PRN    0.9%  NaCl infusion Continuous    acetaminophen oral solution 650 mg Q6H PRN    acetaminophen tablet 500 mg Daily PRN    acyclovir (ZOVIRAX) 710 mg in dextrose 5 % 250 mL IVPB Daily    amlodipine tablet 5 mg Daily    amphotericin B liposome (AMBISOME) 200 mg in dextrose 5 % 200 mL IVPB Q24H    atorvastatin tablet 10 mg QHS    chlorhexidine 0.12 % solution 15 mL BID    dapsone tablet 50 mg Daily    diphenhydrAMINE capsule 25 mg Daily PRN    famotidine tablet 20 mg Daily    fentaNYL injection 100 mcg Once    fentaNYL injection 100 mcg Once    hydrALAZINE injection 10 mg Q4H PRN    hydrALAZINE tablet 100 mg Q8H    labetalol injection 10 mg Q4H PRN    leucovorin tablet 25 mg Weekly    levetiracetam tablet 500 mg Daily    meropenem-0.9% sodium chloride 1 g/50 mL IVPB Q12H    pyrimethamine tablet 50 mg Weekly       Objective:     Vital Signs (Most Recent):  Temp: (!) 100.6 °F (38.1 °C) (09/27/17 0705)  Pulse: (!) 114 (09/27/17 0743)  Resp: 16 (09/27/17 0743)  BP: 131/78 (09/27/17 0705)  SpO2: 100 % (09/27/17 0743)  O2 Device (Oxygen Therapy): ventilator (09/27/17 0743) Vital Signs (24h Range):  Temp:  [97.2 °F (36.2 °C)-101 °F (38.3 °C)] 100.6 °F (38.1 °C)  Pulse:  [] 114  Resp:  [15-28] 16  SpO2:  [94 %-100 %] 100 %  BP: (116-159)/() 131/78     Weight: 78.8 kg (173 lb 11.6 oz) (09/27/17 0705)  Body mass index is 23.56 kg/m².  Body surface area is 2 meters squared.    I/O last 3 completed shifts:  In: 5275.4 [I.V.:2892.5; Blood:732.9; NG/GT:400; IV Piggyback:1250]  Out: 2763 [Urine:100; Other:2663]    Physical Exam   Constitutional: He appears well-developed and  well-nourished.   HENT:   Conjunctival edema      Eyes: Pupils are equal, round, and reactive to light.   Neck: Neck supple.   Cardiovascular: Normal rate, regular rhythm and normal heart sounds.  Exam reveals no friction rub.    No murmur heard.  Pulmonary/Chest: Effort normal and breath sounds normal. He has no wheezes. He has no rales.   intubated   Abdominal: Soft. Bowel sounds are normal. He exhibits no distension. There is no tenderness. There is no guarding.   Musculoskeletal: He exhibits no edema or deformity.   Neurological:   Opens eyes to voice.  Does not follow commands.   Skin: Skin is dry. No rash noted. No erythema.       Significant Labs:  ABGs:   Recent Labs  Lab 09/27/17  0553   PH 7.511*   PCO2 39.8   HCO3 31.9*   POCSATURATED 100   BE 9     CBC:   Recent Labs  Lab 09/26/17  1203 09/27/17  0349   WBC 5.57 8.35   RBC 2.47* 2.35*   HGB 7.7* 7.4*   HCT 23.5* 22.2*   *  --    MCV 95 95   MCH 31.2* 31.5*   MCHC 32.8 33.3     CMP:   Recent Labs  Lab 09/25/17  1514  09/27/17  0349   GLU 91  < > 67*   CALCIUM 7.8*  < > 7.8*   ALBUMIN 2.0*  < > 1.8*   PROT 7.7  --   --      < > 137   K 4.8  < > 4.0   CO2 23  < > 29   CL 98  < > 100   BUN 53*  < > 14   CREATININE 13.3*  < > 4.1*   ALKPHOS 112  --   --    ALT 49*  --   --    AST 87*  --   --    BILITOT 0.5  --   --    < > = values in this interval not displayed.
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
## 2017-09-27 NOTE — PROGRESS NOTES
Notified Andrews Smith NP of pt's temp of 101 oral. Reported pt's on antivirals and currently on CRRT. Per orders give pt 650 mg of tylenol through OG tube now and if pt's temp increases place on cooling blanket.

## 2017-09-27 NOTE — PROGRESS NOTES
Spoke with Dialysis nurse Queenie concerning d/c orders to CRRT. Dialysis nurse notified Kelin Kee NP to clarify orders. Will leave pt on CRRT until clarification met. Dialysis will notify nurse.

## 2017-09-28 PROBLEM — G40.901 STATUS EPILEPTICUS: Status: ACTIVE | Noted: 2017-09-24

## 2017-09-28 PROBLEM — J96.01 ACUTE RESPIRATORY FAILURE WITH HYPOXIA AND HYPERCAPNIA: Status: ACTIVE | Noted: 2017-09-28

## 2017-09-28 PROBLEM — J96.02 ACUTE RESPIRATORY FAILURE WITH HYPOXIA AND HYPERCAPNIA: Status: ACTIVE | Noted: 2017-09-28

## 2017-09-28 LAB
ABO + RH BLD: NORMAL
ABO + RH BLD: NORMAL
ALBUMIN SERPL BCP-MCNC: 1.6 G/DL
ALBUMIN SERPL BCP-MCNC: 1.6 G/DL
ALBUMIN SERPL BCP-MCNC: 1.7 G/DL
ALBUMIN SERPL ELPH-MCNC: 2.08 G/DL
ALLENS TEST: ABNORMAL
ALPHA1 GLOB SERPL ELPH-MCNC: 0.43 G/DL
ALPHA2 GLOB SERPL ELPH-MCNC: 0.53 G/DL
AMYLASE SERPL-CCNC: 189 U/L
ANION GAP SERPL CALC-SCNC: 10 MMOL/L
ANION GAP SERPL CALC-SCNC: 12 MMOL/L
ANION GAP SERPL CALC-SCNC: 8 MMOL/L
ANISOCYTOSIS BLD QL SMEAR: SLIGHT
ANISOCYTOSIS BLD QL SMEAR: SLIGHT
APTT BLDCRRT: 30 SEC
B-GLOBULIN SERPL ELPH-MCNC: 0.9 G/DL
BACTERIA BLD CULT: NORMAL
BACTERIA BLD CULT: NORMAL
BASO STIPL BLD QL SMEAR: ABNORMAL
BASO STIPL BLD QL SMEAR: ABNORMAL
BASOPHILS # BLD AUTO: 0.01 K/UL
BASOPHILS # BLD AUTO: 0.01 K/UL
BASOPHILS NFR BLD: 0.2 %
BASOPHILS NFR BLD: 0.2 %
BLD GP AB SCN CELLS X3 SERPL QL: NORMAL
BLD GP AB SCN CELLS X3 SERPL QL: NORMAL
BLD PROD TYP BPU: NORMAL
BLD PROD TYP BPU: NORMAL
BLOOD UNIT EXPIRATION DATE: NORMAL
BLOOD UNIT EXPIRATION DATE: NORMAL
BLOOD UNIT TYPE CODE: 6200
BLOOD UNIT TYPE CODE: 6200
BLOOD UNIT TYPE: NORMAL
BLOOD UNIT TYPE: NORMAL
BUN SERPL-MCNC: 14 MG/DL
BUN SERPL-MCNC: 20 MG/DL
BUN SERPL-MCNC: 25 MG/DL
CALCIUM SERPL-MCNC: 7.4 MG/DL
CALCIUM SERPL-MCNC: 7.5 MG/DL
CALCIUM SERPL-MCNC: 7.8 MG/DL
CHLORIDE SERPL-SCNC: 101 MMOL/L
CHLORIDE SERPL-SCNC: 104 MMOL/L
CHLORIDE SERPL-SCNC: 99 MMOL/L
CO2 SERPL-SCNC: 24 MMOL/L
CO2 SERPL-SCNC: 26 MMOL/L
CO2 SERPL-SCNC: 27 MMOL/L
CODING SYSTEM: NORMAL
CODING SYSTEM: NORMAL
CREAT SERPL-MCNC: 4 MG/DL
CREAT SERPL-MCNC: 6.5 MG/DL
CREAT SERPL-MCNC: 7.4 MG/DL
DACRYOCYTES BLD QL SMEAR: ABNORMAL
DAT IGG-SP REAG RBC-IMP: NORMAL
DELSYS: ABNORMAL
DIFFERENTIAL METHOD: ABNORMAL
DIFFERENTIAL METHOD: ABNORMAL
DISPENSE STATUS: NORMAL
DISPENSE STATUS: NORMAL
EOSINOPHIL # BLD AUTO: 0 K/UL
EOSINOPHIL # BLD AUTO: 0.1 K/UL
EOSINOPHIL NFR BLD: 0 %
EOSINOPHIL NFR BLD: 1.1 %
ERYTHROCYTE [DISTWIDTH] IN BLOOD BY AUTOMATED COUNT: 16 %
ERYTHROCYTE [DISTWIDTH] IN BLOOD BY AUTOMATED COUNT: 16 %
ERYTHROCYTE [SEDIMENTATION RATE] IN BLOOD BY WESTERGREN METHOD: 14 MM/H
EST. GFR  (AFRICAN AMERICAN): 10.3 ML/MIN/1.73 M^2
EST. GFR  (AFRICAN AMERICAN): 18.5 ML/MIN/1.73 M^2
EST. GFR  (AFRICAN AMERICAN): 8.8 ML/MIN/1.73 M^2
EST. GFR  (NON AFRICAN AMERICAN): 16 ML/MIN/1.73 M^2
EST. GFR  (NON AFRICAN AMERICAN): 7.6 ML/MIN/1.73 M^2
EST. GFR  (NON AFRICAN AMERICAN): 8.9 ML/MIN/1.73 M^2
FIBRINOGEN PPP-MCNC: 418 MG/DL
FIO2: 50
GAMMA GLOB SERPL ELPH-MCNC: 1.87 G/DL
GLUCOSE SERPL-MCNC: 70 MG/DL
GLUCOSE SERPL-MCNC: 71 MG/DL
GLUCOSE SERPL-MCNC: 76 MG/DL
HAPTOGLOB SERPL-MCNC: <10 MG/DL
HCO3 UR-SCNC: 28.9 MMOL/L (ref 24–28)
HCT VFR BLD AUTO: 19.5 %
HCT VFR BLD AUTO: 19.8 %
HGB BLD-MCNC: 6.3 G/DL
HGB BLD-MCNC: 6.5 G/DL
HHV6 IGG TITR SER IF: ABNORMAL TITER
HHV6 IGM TITR SER IF: ABNORMAL TITER
HIV UQ DATE RECEIVED: ABNORMAL
HIV UQ DATE REPORTED: ABNORMAL
HIV1 RNA # SERPL NAA+PROBE: ABNORMAL COPIES/ML
HIV1 RNA SERPL NAA+PROBE-LOG#: 4.76 LOG (10) COPIES/ML
HIV1 RNA SERPL QL NAA+PROBE: DETECTED
HSV1, PCR, CSF: NEGATIVE
HSV2, PCR, CSF: NEGATIVE
HYPOCHROMIA BLD QL SMEAR: ABNORMAL
HYPOCHROMIA BLD QL SMEAR: ABNORMAL
INR PPP: 1
LACTATE CSF-SCNC: 1.8 MMOL/L (ref 1.2–2.6)
LDH SERPL L TO P-CCNC: 450 U/L
LIPASE SERPL-CCNC: 251 U/L
LYMPHOCYTES # BLD AUTO: 0.3 K/UL
LYMPHOCYTES # BLD AUTO: 0.4 K/UL
LYMPHOCYTES NFR BLD: 6.5 %
LYMPHOCYTES NFR BLD: 8.3 %
MAGNESIUM SERPL-MCNC: 1.8 MG/DL
MAGNESIUM SERPL-MCNC: 2.1 MG/DL
MCH RBC QN AUTO: 31.2 PG
MCH RBC QN AUTO: 31.7 PG
MCHC RBC AUTO-ENTMCNC: 32.3 G/DL
MCHC RBC AUTO-ENTMCNC: 32.8 G/DL
MCV RBC AUTO: 97 FL
MCV RBC AUTO: 97 FL
MODE: ABNORMAL
MONOCYTES # BLD AUTO: 0.4 K/UL
MONOCYTES # BLD AUTO: 0.5 K/UL
MONOCYTES NFR BLD: 9.2 %
MONOCYTES NFR BLD: 9.3 %
NEUTROPHILS # BLD AUTO: 3.6 K/UL
NEUTROPHILS # BLD AUTO: 4.2 K/UL
NEUTROPHILS NFR BLD: 81.2 %
NEUTROPHILS NFR BLD: 84 %
OVALOCYTES BLD QL SMEAR: ABNORMAL
PCO2 BLDA: 40.6 MMHG (ref 35–45)
PEEP: 5
PH SMN: 7.46 [PH] (ref 7.35–7.45)
PHOSPHATE SERPL-MCNC: 2.9 MG/DL
PHOSPHATE SERPL-MCNC: 4 MG/DL
PHOSPHATE SERPL-MCNC: 4.5 MG/DL
PLATELET # BLD AUTO: 53 K/UL
PLATELET # BLD AUTO: 58 K/UL
PLATELET BLD QL SMEAR: ABNORMAL
PLATELET BLD QL SMEAR: ABNORMAL
PMV BLD AUTO: 10.7 FL
PMV BLD AUTO: ABNORMAL FL
PO2 BLDA: 235 MMHG (ref 80–100)
POC BE: 5 MMOL/L
POC SATURATED O2: 100 % (ref 95–100)
POC TCO2: 30 MMOL/L (ref 23–27)
POCT GLUCOSE: 72 MG/DL (ref 70–110)
POCT GLUCOSE: 81 MG/DL (ref 70–110)
POIKILOCYTOSIS BLD QL SMEAR: SLIGHT
POLYCHROMASIA BLD QL SMEAR: ABNORMAL
POLYCHROMASIA BLD QL SMEAR: ABNORMAL
POTASSIUM SERPL-SCNC: 3.8 MMOL/L
POTASSIUM SERPL-SCNC: 3.9 MMOL/L
POTASSIUM SERPL-SCNC: 4.2 MMOL/L
PROT SERPL-MCNC: 5.8 G/DL
PROTHROMBIN TIME: 10.9 SEC
RBC # BLD AUTO: 2.02 M/UL
RBC # BLD AUTO: 2.05 M/UL
RETICS/RBC NFR AUTO: 2 %
SAMPLE: ABNORMAL
SCHISTOCYTES BLD QL SMEAR: PRESENT
SITE: ABNORMAL
SODIUM SERPL-SCNC: 135 MMOL/L
SODIUM SERPL-SCNC: 138 MMOL/L
SODIUM SERPL-SCNC: 138 MMOL/L
SP02: 100
SPECIMEN SOURCE: NORMAL
SPECIMEN SOURCE: NORMAL
TRANS ERYTHROCYTES VOL PATIENT: NORMAL ML
TRANS ERYTHROCYTES VOL PATIENT: NORMAL ML
TRANSFERRIN SERPL-MCNC: 82 MG/DL
VARICELLA ZOSTER BY PCR RESULT: NEGATIVE
VT: 450
VZV IGG SER IA-ACNC: 0.96 INDEX
WBC # BLD AUTO: 4.45 K/UL
WBC # BLD AUTO: 5.08 K/UL
WNV RNA SPEC QL NAA+PROBE: NEGATIVE

## 2017-09-28 PROCEDURE — 82803 BLOOD GASES ANY COMBINATION: CPT

## 2017-09-28 PROCEDURE — 80069 RENAL FUNCTION PANEL: CPT | Mod: 91

## 2017-09-28 PROCEDURE — 94003 VENT MGMT INPAT SUBQ DAY: CPT

## 2017-09-28 PROCEDURE — 83010 ASSAY OF HAPTOGLOBIN QUANT: CPT

## 2017-09-28 PROCEDURE — 95951 PR EEG MONITORING/VIDEORECORD: CPT | Mod: 26,,, | Performed by: PSYCHIATRY & NEUROLOGY

## 2017-09-28 PROCEDURE — 86334 IMMUNOFIX E-PHORESIS SERUM: CPT | Mod: 26,,, | Performed by: PATHOLOGY

## 2017-09-28 PROCEDURE — 84165 PROTEIN E-PHORESIS SERUM: CPT | Mod: 26,,, | Performed by: PATHOLOGY

## 2017-09-28 PROCEDURE — 25000003 PHARM REV CODE 250: Performed by: INTERNAL MEDICINE

## 2017-09-28 PROCEDURE — 86901 BLOOD TYPING SEROLOGIC RH(D): CPT | Mod: 91

## 2017-09-28 PROCEDURE — 87040 BLOOD CULTURE FOR BACTERIA: CPT

## 2017-09-28 PROCEDURE — 85045 AUTOMATED RETICULOCYTE COUNT: CPT

## 2017-09-28 PROCEDURE — 25000003 PHARM REV CODE 250: Performed by: NURSE PRACTITIONER

## 2017-09-28 PROCEDURE — 83615 LACTATE (LD) (LDH) ENZYME: CPT

## 2017-09-28 PROCEDURE — 95816 EEG AWAKE AND DROWSY: CPT | Mod: 26,,, | Performed by: PSYCHIATRY & NEUROLOGY

## 2017-09-28 PROCEDURE — 86860 RBC ANTIBODY ELUTION: CPT

## 2017-09-28 PROCEDURE — 95951 HC EEG MONITORING/VIDEO RECORD: CPT

## 2017-09-28 PROCEDURE — 86900 BLOOD TYPING SEROLOGIC ABO: CPT | Mod: 91

## 2017-09-28 PROCEDURE — 99900035 HC TECH TIME PER 15 MIN (STAT)

## 2017-09-28 PROCEDURE — 85025 COMPLETE CBC W/AUTO DIFF WBC: CPT

## 2017-09-28 PROCEDURE — 25000003 PHARM REV CODE 250: Performed by: PHYSICIAN ASSISTANT

## 2017-09-28 PROCEDURE — 36620 INSERTION CATHETER ARTERY: CPT | Mod: GC,,, | Performed by: PSYCHIATRY & NEUROLOGY

## 2017-09-28 PROCEDURE — 83735 ASSAY OF MAGNESIUM: CPT

## 2017-09-28 PROCEDURE — 27000221 HC OXYGEN, UP TO 24 HOURS

## 2017-09-28 PROCEDURE — 99233 SBSQ HOSP IP/OBS HIGH 50: CPT | Mod: GC,,, | Performed by: INTERNAL MEDICINE

## 2017-09-28 PROCEDURE — 85610 PROTHROMBIN TIME: CPT

## 2017-09-28 PROCEDURE — 86022 PLATELET ANTIBODIES: CPT

## 2017-09-28 PROCEDURE — 20000000 HC ICU ROOM

## 2017-09-28 PROCEDURE — 86920 COMPATIBILITY TEST SPIN: CPT

## 2017-09-28 PROCEDURE — 25500020 PHARM REV CODE 255: Performed by: PSYCHIATRY & NEUROLOGY

## 2017-09-28 PROCEDURE — 82150 ASSAY OF AMYLASE: CPT

## 2017-09-28 PROCEDURE — 99900026 HC AIRWAY MAINTENANCE (STAT)

## 2017-09-28 PROCEDURE — 95957 EEG DIGITAL ANALYSIS: CPT

## 2017-09-28 PROCEDURE — 86880 COOMBS TEST DIRECT: CPT

## 2017-09-28 PROCEDURE — 94761 N-INVAS EAR/PLS OXIMETRY MLT: CPT

## 2017-09-28 PROCEDURE — 36600 WITHDRAWAL OF ARTERIAL BLOOD: CPT

## 2017-09-28 PROCEDURE — 85384 FIBRINOGEN ACTIVITY: CPT

## 2017-09-28 PROCEDURE — 85730 THROMBOPLASTIN TIME PARTIAL: CPT

## 2017-09-28 PROCEDURE — 63600175 PHARM REV CODE 636 W HCPCS: Performed by: INTERNAL MEDICINE

## 2017-09-28 PROCEDURE — 86334 IMMUNOFIX E-PHORESIS SERUM: CPT

## 2017-09-28 PROCEDURE — 63600175 PHARM REV CODE 636 W HCPCS

## 2017-09-28 PROCEDURE — 84466 ASSAY OF TRANSFERRIN: CPT

## 2017-09-28 PROCEDURE — 86850 RBC ANTIBODY SCREEN: CPT

## 2017-09-28 PROCEDURE — 80069 RENAL FUNCTION PANEL: CPT

## 2017-09-28 PROCEDURE — 90945 DIALYSIS ONE EVALUATION: CPT

## 2017-09-28 PROCEDURE — 84165 PROTEIN E-PHORESIS SERUM: CPT

## 2017-09-28 PROCEDURE — 25000003 PHARM REV CODE 250: Performed by: PSYCHIATRY & NEUROLOGY

## 2017-09-28 PROCEDURE — 86900 BLOOD TYPING SEROLOGIC ABO: CPT

## 2017-09-28 PROCEDURE — P9021 RED BLOOD CELLS UNIT: HCPCS

## 2017-09-28 PROCEDURE — 99233 SBSQ HOSP IP/OBS HIGH 50: CPT | Mod: ,,, | Performed by: INTERNAL MEDICINE

## 2017-09-28 PROCEDURE — 99291 CRITICAL CARE FIRST HOUR: CPT | Mod: ,,, | Performed by: ANESTHESIOLOGY

## 2017-09-28 PROCEDURE — 63600175 PHARM REV CODE 636 W HCPCS: Performed by: PHYSICIAN ASSISTANT

## 2017-09-28 PROCEDURE — 63600175 PHARM REV CODE 636 W HCPCS: Performed by: NURSE PRACTITIONER

## 2017-09-28 PROCEDURE — 83690 ASSAY OF LIPASE: CPT

## 2017-09-28 RX ORDER — MAGNESIUM SULFATE HEPTAHYDRATE 40 MG/ML
2 INJECTION, SOLUTION INTRAVENOUS
Status: DISCONTINUED | OUTPATIENT
Start: 2017-09-28 | End: 2017-09-29

## 2017-09-28 RX ORDER — HYDROCODONE BITARTRATE AND ACETAMINOPHEN 500; 5 MG/1; MG/1
TABLET ORAL
Status: DISCONTINUED | OUTPATIENT
Start: 2017-09-28 | End: 2017-10-02

## 2017-09-28 RX ORDER — MIDAZOLAM HYDROCHLORIDE 1 MG/ML
2 INJECTION INTRAMUSCULAR; INTRAVENOUS ONCE
Status: COMPLETED | OUTPATIENT
Start: 2017-09-28 | End: 2017-09-28

## 2017-09-28 RX ORDER — LORAZEPAM 2 MG/ML
2 INJECTION INTRAMUSCULAR ONCE
Status: COMPLETED | OUTPATIENT
Start: 2017-09-28 | End: 2017-09-28

## 2017-09-28 RX ORDER — SODIUM,POTASSIUM PHOSPHATES 280-250MG
2 POWDER IN PACKET (EA) ORAL
Status: DISCONTINUED | OUTPATIENT
Start: 2017-09-28 | End: 2017-09-29

## 2017-09-28 RX ORDER — FENTANYL CITRATE 50 UG/ML
100 INJECTION, SOLUTION INTRAMUSCULAR; INTRAVENOUS ONCE
Status: COMPLETED | OUTPATIENT
Start: 2017-09-28 | End: 2017-09-28

## 2017-09-28 RX ORDER — FENTANYL CITRATE 50 UG/ML
50 INJECTION, SOLUTION INTRAMUSCULAR; INTRAVENOUS ONCE
Status: COMPLETED | OUTPATIENT
Start: 2017-09-29 | End: 2017-09-28

## 2017-09-28 RX ORDER — LORAZEPAM 2 MG/ML
INJECTION INTRAMUSCULAR
Status: COMPLETED
Start: 2017-09-28 | End: 2017-09-28

## 2017-09-28 RX ORDER — HYDROCODONE BITARTRATE AND ACETAMINOPHEN 500; 5 MG/1; MG/1
TABLET ORAL
Status: DISCONTINUED | OUTPATIENT
Start: 2017-09-28 | End: 2017-09-29

## 2017-09-28 RX ADMIN — LEVETIRACETAM 500 MG: 500 SOLUTION ORAL at 08:09

## 2017-09-28 RX ADMIN — HYDRALAZINE HYDROCHLORIDE 100 MG: 50 TABLET ORAL at 09:09

## 2017-09-28 RX ADMIN — HYDRALAZINE HYDROCHLORIDE 100 MG: 50 TABLET ORAL at 05:09

## 2017-09-28 RX ADMIN — LORAZEPAM 2 MG: 2 INJECTION INTRAMUSCULAR at 06:09

## 2017-09-28 RX ADMIN — CHLORHEXIDINE GLUCONATE 15 ML: 1.2 RINSE ORAL at 09:09

## 2017-09-28 RX ADMIN — LEVETIRACETAM 500 MG: 500 SOLUTION ORAL at 09:09

## 2017-09-28 RX ADMIN — AMLODIPINE BESYLATE 5 MG: 5 TABLET ORAL at 08:09

## 2017-09-28 RX ADMIN — MEROPENEM AND SODIUM CHLORIDE 1 G: 1 INJECTION, SOLUTION INTRAVENOUS at 05:09

## 2017-09-28 RX ADMIN — AMPHOTERICIN B 200 MG: 50 INJECTION, POWDER, LYOPHILIZED, FOR SOLUTION INTRAVENOUS at 08:09

## 2017-09-28 RX ADMIN — HYDRALAZINE HYDROCHLORIDE 100 MG: 50 TABLET ORAL at 01:09

## 2017-09-28 RX ADMIN — SODIUM CHLORIDE: 0.9 INJECTION, SOLUTION INTRAVENOUS at 05:09

## 2017-09-28 RX ADMIN — MEROPENEM AND SODIUM CHLORIDE 1 G: 1 INJECTION, SOLUTION INTRAVENOUS at 06:09

## 2017-09-28 RX ADMIN — ACYCLOVIR SODIUM 710 MG: 1000 INJECTION, SOLUTION INTRAVENOUS at 05:09

## 2017-09-28 RX ADMIN — CHLORHEXIDINE GLUCONATE 15 ML: 1.2 RINSE ORAL at 08:09

## 2017-09-28 RX ADMIN — IOHEXOL 75 ML: 350 INJECTION, SOLUTION INTRAVENOUS at 03:09

## 2017-09-28 RX ADMIN — DAPSONE 50 MG: 25 TABLET ORAL at 08:09

## 2017-09-28 RX ADMIN — FENTANYL CITRATE 100 MCG: 50 INJECTION INTRAMUSCULAR; INTRAVENOUS at 04:09

## 2017-09-28 RX ADMIN — MIDAZOLAM HYDROCHLORIDE 2 MG: 1 INJECTION, SOLUTION INTRAMUSCULAR; INTRAVENOUS at 03:09

## 2017-09-28 RX ADMIN — LORAZEPAM 2 MG: 2 INJECTION, SOLUTION INTRAMUSCULAR; INTRAVENOUS at 05:09

## 2017-09-28 RX ADMIN — FENTANYL CITRATE 50 MCG: 50 INJECTION INTRAMUSCULAR; INTRAVENOUS at 11:09

## 2017-09-28 RX ADMIN — LORAZEPAM 2 MG: 2 INJECTION INTRAMUSCULAR at 05:09

## 2017-09-28 RX ADMIN — FAMOTIDINE 20 MG: 20 TABLET, FILM COATED ORAL at 08:09

## 2017-09-28 RX ADMIN — ATORVASTATIN CALCIUM 10 MG: 10 TABLET, FILM COATED ORAL at 09:09

## 2017-09-28 RX ADMIN — LORAZEPAM 2 MG: 2 INJECTION, SOLUTION INTRAMUSCULAR; INTRAVENOUS at 06:09

## 2017-09-28 NOTE — ASSESSMENT & PLAN NOTE
Patient with worsening lethargy and encephalopathy since admission. MRI and clinical course not suggestive of bacterial meningitis, on meropenam. MRI with multifocal gyral diffusion restriction and T2/FLAIR hyperintensity involving the bilateral frontal and parietal lobes (particularly the left frontal eye field). Result suggestive of HIV encephalopathy/HIV associated infection, seizure activity, or multifocal infarcts. Also considered PML with out CSF samples to evaluate difficult to determine if infectious cause. MRI with no enhancing lesions or mass effect suggestive of cryptococcus with also negative antigen result or toxoplasmosis. If PML or HIV encephalopathy management is supportive. Cidofovir only recommended on patient that had been on HAART prior to PML. Now in the NICU as of 9/25, and intubated, due to AMS.     - recommend discontinuing acyclovir as he does not have evidence of HSV.

## 2017-09-28 NOTE — ASSESSMENT & PLAN NOTE
-Patient is ESRD on HD since May 2017? Underlying etiology uncertain, ?HIV nephropathy, reportedly last received session on Friday.   -MRI concern for cerebritis or other infection. ID following, LP performed.    Last received SLED on Tuesday and tolerated well.  Net positive 1.2L/24h.  Received CT with contrast yesterday, receiving PRBC today.  -will provide 12 hour SLED for metabolic clearance/volume management  --400 cc/hr as tolerated.
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
## 2017-09-28 NOTE — SUBJECTIVE & OBJECTIVE
Past Medical History:   Diagnosis Date    ESRD on hemodialysis     HIV (human immunodeficiency virus infection)     Seizures        Past Surgical History:   Procedure Laterality Date    LUMBAR PUNCTURE  9/26/2017            Review of patient's allergies indicates:  No Known Allergies    Medications:  Prescriptions Prior to Admission   Medication Sig    cloNIDine (CATAPRES) 0.1 MG tablet Take 0.1 mg by mouth 2 (two) times daily.    hydrALAZINE (APRESOLINE) 50 MG tablet Take 50 mg by mouth every 6 (six) hours.     lamivudine-zidovudine 150-300mg (COMBIVIR) 150-300 mg Tab Take 1 tablet by mouth every 12 (twelve) hours.    levetiracetam (KEPPRA) 1000 MG tablet Take 500 mg by mouth every Mon, Wed, Fri. Take after dialysis.     Antibiotics     Start     Stop Route Frequency Ordered    09/26/17 0900  dapsone tablet 50 mg      -- PER NG TUBE Daily 09/25/17 1743    09/26/17 0600  meropenem-0.9% sodium chloride 1 g/50 mL IVPB      -- IV Every 12 hours (non-standard times) 09/26/17 0449        Antifungals     Start     Stop Route Frequency Ordered    09/27/17 0830  amphotericin B liposome (AMBISOME) 200 mg in dextrose 5 % 200 mL IVPB  (amphotericin B liposome (AMBISOME) IVPB panel)      -- IV Every 24 hours (non-standard times) 09/27/17 0648        Antivirals         Stop Route Frequency     acyclovir (ZOVIRAX) injection      -- IV Daily             There is no immunization history on file for this patient.    Family History     Family history is unknown by patient.        Social History     Social History    Marital status:      Spouse name: N/A    Number of children: N/A    Years of education: N/A     Social History Main Topics    Smoking status: Never Smoker    Smokeless tobacco: Never Used    Alcohol use No    Drug use: No    Sexual activity: Yes     Partners: Female     Other Topics Concern    None     Social History Narrative    None     Review of Systems   Unable to perform ROS: Intubated      Objective:     Vital Signs (Most Recent):  Temp: (P) 97.5 °F (36.4 °C) (09/28/17 1040)  Pulse: 103 (09/28/17 1025)  Resp: 17 (09/28/17 1025)  BP: (!) 144/81 (09/28/17 1025)  SpO2: 100 % (09/28/17 1025) Vital Signs (24h Range):  Temp:  [97.5 °F (36.4 °C)-99.5 °F (37.5 °C)] (P) 97.5 °F (36.4 °C)  Pulse:  [101-126] 103  Resp:  [3-24] 17  SpO2:  [99 %-100 %] 100 %  BP: (104-144)/(65-89) 144/81  Arterial Line BP: (112-163)/() 139/77     Weight: 78.8 kg (173 lb 11.6 oz)  Body mass index is 23.56 kg/m².    Estimated Creatinine Clearance: 14.4 mL/min (based on SCr of 6.5 mg/dL (H)).    Physical Exam   Constitutional: He appears well-developed and well-nourished.   Eyes: Pupils are equal, round, and reactive to light.   Neck: Neck supple.   Cardiovascular: Regular rhythm and normal heart sounds.  Exam reveals no friction rub.    No murmur heard.   (improved)   Pulmonary/Chest: Effort normal and breath sounds normal. He has no wheezes. He has no rales.   intubated   Abdominal: Soft. Bowel sounds are normal. He exhibits no distension. There is no tenderness. There is no guarding.   Musculoskeletal: He exhibits no edema or deformity.   Skin: Skin is dry. No rash noted. No erythema.       Significant Labs:   Blood Culture:     Recent Labs  Lab 09/23/17 2039 09/23/17 2122 09/24/17  0013 09/27/17  0617 09/27/17  0625   LABBLOO No Growth to date  No Growth to date  No Growth to date  No Growth to date  No Growth to date No Growth to date  No Growth to date  No Growth to date  No Growth to date  No Growth to date No Growth to date  No Growth to date  No Growth to date  No Growth to date  No Growth to date No Growth to date  No Growth to date No Growth to date  No Growth to date     BMP:     Recent Labs  Lab 09/27/17  0041  09/28/17  0300   GLU  --   < > 71   NA  --   < > 138   K  --   < > 3.9   CL  --   < > 101   CO2  --   < > 27   BUN  --   < > 20   CREATININE  --   < > 6.5*   CALCIUM  --   < > 7.4*    MG 1.3*  --   --    < > = values in this interval not displayed.  C4 Count: No results for input(s): C4 in the last 48 hours.  CBC:     Recent Labs  Lab 09/27/17  0349 09/28/17  0300 09/28/17  0455   WBC 8.35 5.08 4.45   HGB 7.4* 6.5* 6.3*   HCT 22.2* 19.8* 19.5*   PLT 77* 53* 58*       Significant Imaging: I have reviewed all pertinent imaging results/findings within the past 24 hours.

## 2017-09-28 NOTE — PROGRESS NOTES
Pt on continuous EEG monitoring. Dr. Bel MD at bedside. Ativan 2mg given. Will continue to monitor.

## 2017-09-28 NOTE — PLAN OF CARE
Problem: Patient Care Overview  Goal: Plan of Care Review  POC reviewed with pt at 0500. Pt unable to verbalize understanding. No acute events overnight. CT and MRI complete. Pt progressing toward goals. Will continue to monitor. See flowsheets for full assessment and VS info

## 2017-09-28 NOTE — PROGRESS NOTES
[facility]  Nephrology  Progress Note    Patient Name: Volodymyr Pope  MRN: [id number]  Admission Date: 9/23/2017  Hospital Length of Stay: 5 days  Attending Provider: Julio Granger MD   Primary Care Physician: Bulmaro Flores  Principal Problem:Status epilepticus    Subjective:     HPI: 53 yearold male with AIDS (CD 49 not on ART or PPx), ESRD on HD (M,W,F), and seizure disorder presents as a transfer from [facility] for Neurology eval and LP.  Patient presented to OSH on 9/15 from a dialysis center for HTN.  He was admitted for HTN urgency and routine workup revealed thrombocytopenia of 19,000.  His hospital course was complicated by AMS with lethargy.  MRI revealed multiple areas concerning for embolic strokes with seziure activity.  Neurology initially felt his AMS and resulting seizure was 2/2 to stroke and started him on Keppra, ASA and stain.  His mental status initially improved however he was found to be HTN with a fever and spent a brief time in the ICU.  Patient was started on ceftriaxone and Vanc given concern for UTI.  After stepping down to the floor patient was once again found to be lethargic and confused.  Neurology wished to pursue LP but due to platelet count of 52,000 they felt uncomfortable due to lack of Neurosurgery and this is the reason for transfer to [facility].  Other PMH significant for poorly controlled HTN and HIV/?AIDS with uncertain compliance.  Patient reportedly initially was Aox4, but when I see him he is not alert, not answering questions, arousable, but barely.     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    Prior to hospital admit in [place] he was receiving HD at [facility] in [place] on MWF through a R sided tunneled access line (reportedly w/o access problems and access placed in May), his nephrologist in [place] is Dr Hammond, treatment duration 4hrs, EDW: 78 kg, Normal UF 1-2 L, he does have residual renal function, uncertain how much, although noted he has made about 600ccs of urine overnight.    He reportedly received dialysis this past Friday, his lytes, acid/base and volume status are stable this morning, he is actually net negative 400ccs overnight, and appears to be below his EDW.      Interval History:   RRT held yesterday, net positive 1.2L/24h.  Electrolytes stable this morning.  H/H dropped, currently receiving 2 units of PRBC today.  Had CT with contrast performed yesterday.    Review of patient's allergies indicates:  No Known Allergies  Current Facility-Administered Medications   Medication Frequency    0.9%  NaCl infusion (for blood administration) Q24H PRN    0.9%  NaCl infusion (for blood administration) Q24H PRN    0.9%  NaCl infusion (for blood administration) Q24H PRN    0.9%  NaCl infusion (for blood administration) Q24H PRN    acetaminophen oral solution 650 mg Q6H PRN    acetaminophen tablet 500 mg Daily PRN    acyclovir (ZOVIRAX) 710 mg in dextrose 5 % 250 mL IVPB Daily    amlodipine tablet 5 mg Daily    amphotericin B liposome (AMBISOME) 200 mg in dextrose 5 % 200 mL IVPB Q24H    atorvastatin tablet 10 mg QHS    chlorhexidine 0.12 % solution 15 mL BID    dapsone tablet 50 mg Daily    diphenhydrAMINE capsule 25 mg Daily PRN    famotidine tablet 20 mg Daily    hydrALAZINE injection 10 mg Q4H PRN    hydrALAZINE tablet 100 mg Q8H    labetalol injection 10 mg Q4H PRN    leucovorin tablet 25 mg Weekly    levetiracetam oral soln Soln 500 mg BID    meropenem-0.9% sodium chloride 1 g/50 mL IVPB Q12H    pyrimethamine tablet 50 mg Weekly       Objective:     Vital Signs (Most  Recent):  Temp: 97.6 °F (36.4 °C) (09/28/17 1025)  Pulse: 101 (09/28/17 0912)  Resp: 18 (09/28/17 0912)  BP: (!) 144/81 (09/28/17 1025)  SpO2: 100 % (09/28/17 0912)  O2 Device (Oxygen Therapy): ventilator (09/28/17 1025) Vital Signs (24h Range):  Temp:  [97.6 °F (36.4 °C)-99.6 °F (37.6 °C)] 97.6 °F (36.4 °C)  Pulse:  [101-126] 101  Resp:  [3-24] 18  SpO2:  [99 %-100 %] 100 %  BP: (104-144)/(65-89) 144/81  Arterial Line BP: (112-163)/() 140/76     Weight: 78.8 kg (173 lb 11.6 oz) (09/27/17 0705)  Body mass index is 23.56 kg/m².  Body surface area is 2 meters squared.    I/O last 3 completed shifts:  In: 2621.3 [I.V.:1271.3; NG/GT:400; IV Piggyback:950]  Out: 2568 [Urine:100; Other:2468]    Physical Exam   Constitutional: He appears well-developed and well-nourished.   HENT:   Head: Normocephalic and atraumatic.   Eyes: Pupils are equal, round, and reactive to light.   Neck: Neck supple.   Cardiovascular: Normal rate, regular rhythm and normal heart sounds.  Exam reveals no friction rub.    No murmur heard.  Pulmonary/Chest: Effort normal and breath sounds normal. He has no wheezes. He has no rales.   intubated   Abdominal: Soft. Bowel sounds are normal. He exhibits no distension. There is no tenderness. There is no guarding.   Musculoskeletal: He exhibits no edema or deformity.   Neurological: He is alert.   Opens eyes to voice. Follows simple commands.   Skin: Skin is dry. No rash noted. No erythema.       Significant Labs:  CBC:   Recent Labs  Lab 09/28/17  0455   WBC 4.45   RBC 2.02*   HGB 6.3*   HCT 19.5*   PLT 58*   MCV 97   MCH 31.2*   MCHC 32.3     CMP:   Recent Labs  Lab 09/25/17  1514  09/28/17  0300   GLU 91  < > 71   CALCIUM 7.8*  < > 7.4*   ALBUMIN 2.0*  < > 1.6*   PROT 7.7  --   --      < > 138   K 4.8  < > 3.9   CO2 23  < > 27   CL 98  < > 101   BUN 53*  < > 20   CREATININE 13.3*  < > 6.5*   ALKPHOS 112  --   --    ALT 49*  --   --    AST 87*  --   --    BILITOT 0.5  --   --    < > = values  in this interval not displayed.         Assessment/Plan:     ESRD (end stage renal disease)    -Patient is ESRD on HD since May 2017? Underlying etiology uncertain, ?HIV nephropathy, reportedly last received session on Friday.   -MRI concern for cerebritis or other infection. ID following, LP performed.    Last received SLED on Tuesday and tolerated well.  Net positive 1.2L/24h.  Received CT with contrast yesterday, receiving PRBC today.  -will provide 12 hour SLED for metabolic clearance/volume management  --400 cc/hr as tolerated.            Andrews Garcia NP  Nephrology  [facility]  Pager:  [phone]

## 2017-09-28 NOTE — SUBJECTIVE & OBJECTIVE
Interval History:   RRT held yesterday, net positive 1.2L/24h.  Electrolytes stable this morning.  H/H dropped, currently receiving 2 units of PRBC today.  Had CT with contrast performed yesterday.    Review of patient's allergies indicates:  No Known Allergies  Current Facility-Administered Medications   Medication Frequency    0.9%  NaCl infusion (for blood administration) Q24H PRN    0.9%  NaCl infusion (for blood administration) Q24H PRN    0.9%  NaCl infusion (for blood administration) Q24H PRN    0.9%  NaCl infusion (for blood administration) Q24H PRN    acetaminophen oral solution 650 mg Q6H PRN    acetaminophen tablet 500 mg Daily PRN    acyclovir (ZOVIRAX) 710 mg in dextrose 5 % 250 mL IVPB Daily    amlodipine tablet 5 mg Daily    amphotericin B liposome (AMBISOME) 200 mg in dextrose 5 % 200 mL IVPB Q24H    atorvastatin tablet 10 mg QHS    chlorhexidine 0.12 % solution 15 mL BID    dapsone tablet 50 mg Daily    diphenhydrAMINE capsule 25 mg Daily PRN    famotidine tablet 20 mg Daily    hydrALAZINE injection 10 mg Q4H PRN    hydrALAZINE tablet 100 mg Q8H    labetalol injection 10 mg Q4H PRN    leucovorin tablet 25 mg Weekly    levetiracetam oral soln Soln 500 mg BID    meropenem-0.9% sodium chloride 1 g/50 mL IVPB Q12H    pyrimethamine tablet 50 mg Weekly       Objective:     Vital Signs (Most Recent):  Temp: 97.6 °F (36.4 °C) (09/28/17 1025)  Pulse: 101 (09/28/17 0912)  Resp: 18 (09/28/17 0912)  BP: (!) 144/81 (09/28/17 1025)  SpO2: 100 % (09/28/17 0912)  O2 Device (Oxygen Therapy): ventilator (09/28/17 1025) Vital Signs (24h Range):  Temp:  [97.6 °F (36.4 °C)-99.6 °F (37.6 °C)] 97.6 °F (36.4 °C)  Pulse:  [101-126] 101  Resp:  [3-24] 18  SpO2:  [99 %-100 %] 100 %  BP: (104-144)/(65-89) 144/81  Arterial Line BP: (112-163)/() 140/76     Weight: 78.8 kg (173 lb 11.6 oz) (09/27/17 0705)  Body mass index is 23.56 kg/m².  Body surface area is 2 meters squared.    I/O last 3 completed  shifts:  In: 2621.3 [I.V.:1271.3; NG/GT:400; IV Piggyback:950]  Out: 2568 [Urine:100; Other:2468]    Physical Exam   Constitutional: He appears well-developed and well-nourished.   HENT:   Head: Normocephalic and atraumatic.   Eyes: Pupils are equal, round, and reactive to light.   Neck: Neck supple.   Cardiovascular: Normal rate, regular rhythm and normal heart sounds.  Exam reveals no friction rub.    No murmur heard.  Pulmonary/Chest: Effort normal and breath sounds normal. He has no wheezes. He has no rales.   intubated   Abdominal: Soft. Bowel sounds are normal. He exhibits no distension. There is no tenderness. There is no guarding.   Musculoskeletal: He exhibits no edema or deformity.   Neurological: He is alert.   Opens eyes to voice. Follows simple commands.   Skin: Skin is dry. No rash noted. No erythema.       Significant Labs:  CBC:   Recent Labs  Lab 09/28/17  0455   WBC 4.45   RBC 2.02*   HGB 6.3*   HCT 19.5*   PLT 58*   MCV 97   MCH 31.2*   MCHC 32.3     CMP:   Recent Labs  Lab 09/25/17  1514  09/28/17  0300   GLU 91  < > 71   CALCIUM 7.8*  < > 7.4*   ALBUMIN 2.0*  < > 1.6*   PROT 7.7  --   --      < > 138   K 4.8  < > 3.9   CO2 23  < > 27   CL 98  < > 101   BUN 53*  < > 20   CREATININE 13.3*  < > 6.5*   ALKPHOS 112  --   --    ALT 49*  --   --    AST 87*  --   --    BILITOT 0.5  --   --    < > = values in this interval not displayed.

## 2017-09-28 NOTE — SUBJECTIVE & OBJECTIVE
Interval History:   - neurocritical care notes seizure-like activity yesterday on EEG, possibly status epilepticus. He had a coherent interaction with neurocritical team yesterday afternoon.  - febrile episode of 101 at 0418 hrs on 9/27/17.  - patient remains intubated today. He will be undergoing a repeat EEG.    Oncology Treatment Plan:   [No treatment plan]    Medications:  Continuous Infusions:   Scheduled Meds:   acyclovir  10 mg/kg Intravenous Daily    amlodipine  5 mg Per NG tube Daily    amphotericin B liposome (AMBISOME) IVPB  3 mg/kg Intravenous Q24H    atorvastatin  10 mg Per NG tube QHS    chlorhexidine  15 mL Mouth/Throat BID    dapsone  50 mg Per NG tube Daily    famotidine  20 mg Per NG tube Daily    hydrALAZINE  100 mg Per NG tube Q8H    leucovorin  25 mg Per NG tube Weekly    levetiracetam oral soln  500 mg Per NG tube BID    meropenem (MERREM) IVPB  1 g Intravenous Q12H    pyrimethamine  50 mg Per NG tube Weekly     PRN Meds:sodium chloride, sodium chloride, sodium chloride, sodium chloride, acetaminophen, acetaminophen, diphenhydrAMINE, hydrALAZINE, labetalol     Review of Systems   Unable to perform ROS: Intubated     Objective:     Vital Signs (Most Recent):  Temp: 97.6 °F (36.4 °C) (09/28/17 1025)  Pulse: 101 (09/28/17 0912)  Resp: 18 (09/28/17 0912)  BP: (!) 144/81 (09/28/17 1025)  SpO2: 100 % (09/28/17 0912) Vital Signs (24h Range):  Temp:  [97.6 °F (36.4 °C)-99.6 °F (37.6 °C)] 97.6 °F (36.4 °C)  Pulse:  [101-126] 101  Resp:  [3-24] 18  SpO2:  [99 %-100 %] 100 %  BP: (104-144)/(65-89) 144/81  Arterial Line BP: (112-163)/() 140/76     Weight: 78.8 kg (173 lb 11.6 oz)  Body mass index is 23.56 kg/m².  Body surface area is 2 meters squared.      Intake/Output Summary (Last 24 hours) at 09/28/17 1038  Last data filed at 09/28/17 0900   Gross per 24 hour   Intake              885 ml   Output                0 ml   Net              885 ml     Physical Exam   Constitutional: He  appears well-developed.   Thin black male, intubated   HENT:   Head: Normocephalic and atraumatic.   Mucosa moist, drooling  ET tube in place   Eyes: Conjunctivae are normal. No scleral icterus.   Cardiovascular:   No murmur heard.  Tachycardic but regular   Pulmonary/Chest: No stridor. He has no wheezes.   Mechanical ventilation with harsh breath sounds   Abdominal: Soft. Bowel sounds are normal. He exhibits no distension. There is no guarding.   Musculoskeletal: He exhibits no edema.   Neurological:   Awake, somnolent but arousable to voice. Does not follow commands   Skin: Skin is warm and dry.     Significant Labs:   Labs have been reviewed.    CT chest/abdomen/pelvis (9/27/17):  1.  Multiple prominent and mildly enlarged periaortic lymph nodes.  These are of uncertain etiology and could possibly be reactive versus sequela of lymphoproliferative process.  Clinical correlation and further evaluation as warranted.    2.  Diffuse gallbladder wall thickening, which could be reactive secondary to underlying hepatic dysfunction.  If there is clinical concern for acute cholecystitis, further evaluation with ultrasound can be performed.  Mild periportal edema and mild hepatomegaly.    3.  Bibasilar atelectatic versus consolidative change, clinical correlation advised.  Endotracheal tube in place.    4.  Nonspecific small volume of free pelvic fluid.

## 2017-09-28 NOTE — ASSESSMENT & PLAN NOTE
Case of 51 y/o male with PMHX of HIV currently AIDS not treated at this time with no follow up in out patient to our knowledge. Patient CD4 count from OSH 49. CD4 count on 9/23 18.4. Which requires PCP and Toxoplasma and MAC prophylaxis.     - Would recommend to adjust coverage:  discontinue dapsone, leucovorine and pyrimethamine and start Bactrim DS once daily PO to cover PCP and Toxoplasma prophylaxis.   - continue meropenam  - HAART not indicated until we determine status of current lethargic state and rule out infectious causes. Begin HAART can cause an aggressive immune response also known as IRIS. It can also worcen HIV encephalopathy and PML that is possible etiology as suggested by MRI results.   - RPR (9/23) nonreactive, HIV RNA quant (9/26) pending.  - recommend Azythromycin for MAC prophylaxis   - Previously recommended Hepatitis Panel. Histoplasmosis ag pending.  CT chest/abdo reveals many prominent and mildly enlarged periaortic lymph nodes.    - EBV PCR; CMV PCR; JCV IgG all pending.  FTA reactive minimal.  - on amphotericin

## 2017-09-28 NOTE — PROGRESS NOTES
Axillary temp 96.1 F. NCC team notified. Verbal order to place pt on bear-hugger. Will continue to monitor.

## 2017-09-28 NOTE — PROGRESS NOTES
[facility]  Hematology/Oncology  Progress Note    Patient Name: Volodymyr Pope  Admission Date: 9/23/2017  Hospital Length of Stay: 5 days  Code Status: Full Code     Subjective:     HPI:  Mr. Volodymyr Pope is a 52 year old male with AIDS (CD 49, not on HAART or prophylaxis), ESRD on HD , and seizure disorder. Consultation to hematology was made for thrombocytopenia. Patient was sent to [facility] ED on 9/15 from dialysis for evaluation of HTN and admitted for hypertensive urgency. Routine labs revealed platelet count of 19,000. He was also found to have altered mental status and had seizure during that hospitalization, which was then evaluated with MRI brain which revealed embolic strokes. He then developed a fever and was transferred to the ICU. He was treated with IV Vancomycin and Rocephin for presumed UTI . He initially had some improvement in mental status and was stepped down. After this, his mental status again worsened, and neurology sought an LP for further evaluation, but did not feel comfortable doing this without neurosurgery in house, given thrombocytopenia.     Mr. Pope was transferred to [facility] for LP on 9/24. He has been intubated for worsening mental status. He was transfused one unit platelets on 9/26 and LP was performed, which revealed protein of 65 and WBC 1. Hematology was consulted for further evaluation of thrombocytopenia. Infectious disease requests bone marrow evaluation to give insight as to underlying infectious etiology.     Interval History:   - neurocritical care notes seizure-like activity yesterday on EEG, possibly status epilepticus. He had a coherent interaction with neurocritical team yesterday afternoon.  - febrile episode of 101 at 0418 hrs on 9/27/17.  - patient remains intubated today. He will be undergoing a repeat EEG.    Oncology Treatment Plan:   [No treatment plan]    Medications:  Continuous Infusions:   Scheduled Meds:   acyclovir  10 mg/kg Intravenous Daily     amlodipine  5 mg Per NG tube Daily    amphotericin B liposome (AMBISOME) IVPB  3 mg/kg Intravenous Q24H    atorvastatin  10 mg Per NG tube QHS    chlorhexidine  15 mL Mouth/Throat BID    dapsone  50 mg Per NG tube Daily    famotidine  20 mg Per NG tube Daily    hydrALAZINE  100 mg Per NG tube Q8H    leucovorin  25 mg Per NG tube Weekly    levetiracetam oral soln  500 mg Per NG tube BID    meropenem (MERREM) IVPB  1 g Intravenous Q12H    pyrimethamine  50 mg Per NG tube Weekly     PRN Meds:sodium chloride, sodium chloride, sodium chloride, sodium chloride, acetaminophen, acetaminophen, diphenhydrAMINE, hydrALAZINE, labetalol     Review of Systems   Unable to perform ROS: Intubated     Objective:     Vital Signs (Most Recent):  Temp: 97.6 °F (36.4 °C) (09/28/17 1025)  Pulse: 101 (09/28/17 0912)  Resp: 18 (09/28/17 0912)  BP: (!) 144/81 (09/28/17 1025)  SpO2: 100 % (09/28/17 0912) Vital Signs (24h Range):  Temp:  [97.6 °F (36.4 °C)-99.6 °F (37.6 °C)] 97.6 °F (36.4 °C)  Pulse:  [101-126] 101  Resp:  [3-24] 18  SpO2:  [99 %-100 %] 100 %  BP: (104-144)/(65-89) 144/81  Arterial Line BP: (112-163)/() 140/76     Weight: 78.8 kg (173 lb 11.6 oz)  Body mass index is 23.56 kg/m².  Body surface area is 2 meters squared.      Intake/Output Summary (Last 24 hours) at 09/28/17 1038  Last data filed at 09/28/17 0900   Gross per 24 hour   Intake              885 ml   Output                0 ml   Net              885 ml     Physical Exam   Constitutional: He appears well-developed.   Thin black male, intubated   HENT:   Head: Normocephalic and atraumatic.   Mucosa moist, drooling  ET tube in place   Eyes: Conjunctivae are normal. No scleral icterus.   Cardiovascular:   No murmur heard.  Tachycardic but regular   Pulmonary/Chest: No stridor. He has no wheezes.   Mechanical ventilation with harsh breath sounds   Abdominal: Soft. Bowel sounds are normal. He exhibits no distension. There is no guarding.    Musculoskeletal: He exhibits no edema.   Neurological:   Awake, somnolent but arousable to voice. Does not follow commands   Skin: Skin is warm and dry.     Significant Labs:   Labs have been reviewed.    CT chest/abdomen/pelvis (9/27/17):  1.  Multiple prominent and mildly enlarged periaortic lymph nodes.  These are of uncertain etiology and could possibly be reactive versus sequela of lymphoproliferative process.  Clinical correlation and further evaluation as warranted.    2.  Diffuse gallbladder wall thickening, which could be reactive secondary to underlying hepatic dysfunction.  If there is clinical concern for acute cholecystitis, further evaluation with ultrasound can be performed.  Mild periportal edema and mild hepatomegaly.    3.  Bibasilar atelectatic versus consolidative change, clinical correlation advised.  Endotracheal tube in place.    4.  Nonspecific small volume of free pelvic fluid.    Assessment/Plan:     Thrombocytopenia    - I have reviewed the peripheral smear. Platelets are decreased in number. There is no platelet clumping. A few schistocytes (2-3) per high-power field.  - CT imaging revealed minimal retroperitoneal lymphadenopathy (~1.2 cm). Spleen was normal in size. We do not feel that the minimal lymphadenopathy represents a lymphoproliferative process (at least from imaging).  - patient had a positive direct Yvonne for IgG and complement. Haptoglobin is less than 10. These could represent an autoimmune hemolytic anemia or a nonspecific false-positive Yvonne.  - I will check fibrinogen. His coagulation studies are normal, which would somewhat argue against disseminated intravascular coagulation. However, a decreased fibrinogen may point towards that diagnosis.  - continue treatment of sepsis (unclear source).  - we may consider a bone marrow biopsy tomorrow if workup is still unclear.            Oren Gonsales MD  Hematology/Oncology  [facility]

## 2017-09-28 NOTE — ASSESSMENT & PLAN NOTE
- I have reviewed the peripheral smear. Platelets are decreased in number. There is no platelet clumping. A few schistocytes (2-3) per high-power field.  - CT imaging revealed minimal retroperitoneal lymphadenopathy (~1.2 cm). Spleen was normal in size. We do not feel that the minimal lymphadenopathy represents a lymphoproliferative process (at least from imaging).  - patient had a positive direct Yvonne for IgG and complement. Haptoglobin is less than 10. These could represent an autoimmune hemolytic anemia or a nonspecific false-positive Yvonne.  - I will check fibrinogen. His coagulation studies are normal, which would somewhat argue against disseminated intravascular coagulation. However, a decreased fibrinogen may point towards that diagnosis.  - continue treatment of sepsis (unclear source).  - we may consider a bone marrow biopsy tomorrow if workup is still unclear.

## 2017-09-28 NOTE — PLAN OF CARE
Problem: Patient Care Overview  Goal: Plan of Care Review  POC reviewed with pt and family at 1500. Pt unable to verbalize understanding. Pt family verbalized understanding via telephone. Questions and concerns addressed. Pt on continuous EEG monitoring and CRRT per MD orders. Will continue to monitor. See flowsheets for full assessment and VS info.

## 2017-09-28 NOTE — PROGRESS NOTES
Pt bilateral upper extremities not withdrawing to painful stimuli. Pt not engaging to voice. Dr. Bel MD at bedside. Order to give Ativan 2mg. Will continue to monitor.

## 2017-09-28 NOTE — PLAN OF CARE
Problem: Patient Care Overview  Goal: Plan of Care Review  Outcome: Ongoing (interventions implemented as appropriate)  POC reviewed with pt and family at 1400. Pt unable to verbalize understanding r/t ETT. Family verbalized understanding. Questions and concerns addressed. Pt's neuro status improved throughout the shift. Pt progressing toward goals. Will continue to monitor. See flowsheets for full assessment and VS info.

## 2017-09-28 NOTE — PROGRESS NOTES
ICU Progress Note  Neurocritical Care    Admit Date: 9/23/2017  LOS: 5    CC: Status epilepticus    Code Status: Full Code     SUBJECTIVE:     Interval History/Significant Events:   Sepsis  arf  Altered mental;; status  ESRD  Anemia chronic and acute loss  High ary/lipase  Thrombocytopenia  Moderate mal-nutrition        Medications:  Continuous Infusions:   Scheduled Meds:   acyclovir  10 mg/kg Intravenous Daily    amlodipine  5 mg Per NG tube Daily    amphotericin B liposome (AMBISOME) IVPB  3 mg/kg Intravenous Q24H    atorvastatin  10 mg Per NG tube QHS    chlorhexidine  15 mL Mouth/Throat BID    dapsone  50 mg Per NG tube Daily    famotidine  20 mg Per NG tube Daily    hydrALAZINE  100 mg Per NG tube Q8H    leucovorin  25 mg Per NG tube Weekly    levetiracetam oral soln  500 mg Per NG tube BID    meropenem (MERREM) IVPB  1 g Intravenous Q12H    pyrimethamine  50 mg Per NG tube Weekly     PRN Meds:.sodium chloride, sodium chloride, sodium chloride, sodium chloride, acetaminophen, acetaminophen, diphenhydrAMINE, hydrALAZINE, labetalol    OBJECTIVE:   Vital Signs (Most Recent):   Temp: 97.8 °F (36.6 °C) (09/28/17 0712)  Pulse: 101 (09/28/17 0912)  Resp: 18 (09/28/17 0912)  BP: 118/70 (09/28/17 0300)  SpO2: 100 % (09/28/17 0912)    Vital Signs (24h Range):   Temp:  [97.8 °F (36.6 °C)-99.6 °F (37.6 °C)] 97.8 °F (36.6 °C)  Pulse:  [101-126] 101  Resp:  [3-24] 18  SpO2:  [99 %-100 %] 100 %  BP: (104-133)/(65-89) 118/70  Arterial Line BP: (112-163)/() 140/76    ICP/CPP (Last 24h):        I & O (Last 24h):   Intake/Output Summary (Last 24 hours) at 09/28/17 0947  Last data filed at 09/28/17 0900   Gross per 24 hour   Intake              960 ml   Output                0 ml   Net              960 ml     Physical Exam:  GA: Alert, comfortable, no acute distress.   HEENT: No scleral icterus or JVD.   Pulmonary: Clear to auscultation A/P/L. No wheezing, crackles, or rhonchi.  Cardiac: RRR S1 & S2 w/o  rubs/murmurs/gallops.   Abdominal: Bowel sounds present x 4. No appreciable hepatosplenomegaly.  Skin: No jaundice, rashes, or visible lesions.  Neuro:  Awake  Interactive at times  No change in exam      Vent Data:   Vent Mode: A/C  Oxygen Concentration (%):  [40-50] 40  Resp Rate Total:  [14 br/min-33 br/min] 17 br/min  Vt Set:  [450 mL] 450 mL  PEEP/CPAP:  [5 cmH20] 5 cmH20  Pressure Support:  [0 cmH20] 0 cmH20  Mean Airway Pressure:  [8.3 uwC73-61 cmH20] 9.2 cmH20    Lines/Drains/Airway:  Indwelling RIJ cath ? age       Airway - Non-Surgical 09/25/17 1129 Endotracheal Tube (Active)   Secured at 27 cm 9/28/2017  7:44 AM   Measured At Lips 9/28/2017  7:44 AM   Secured Location Center 9/28/2017  7:44 AM   Secured by Commercial tube lopes 9/28/2017  7:44 AM   Bite Block secure and patent 9/28/2017  7:44 AM   Site Condition Cool;Dry 9/28/2017  7:44 AM   Status Intact;Secured;Patent 9/28/2017  7:44 AM   Site Assessment Clean;Dry 9/28/2017  7:44 AM           Tunneled Central Line Insertion/Assessment - Double Lumen  09/23/17 1100 right subclavian (Active)   Dressing biopatch in place;dressing dry and intact 9/28/2017  3:00 AM   IV Device Securement sutures 9/28/2017  3:00 AM   Additional Site Signs no erythema;no warmth;no edema;no drainage;no streak formation 9/27/2017  3:05 PM   Distal Patency/Care normal saline locked 9/28/2017  3:00 AM   Proximal Patency/Care normal saline locked 9/28/2017  3:00 AM   Dressing Change Due 10/03/17 9/28/2017  3:00 AM   Daily Line Review Performed 9/28/2017  3:00 AM           Arterial Line 09/27/17 1808 Left Radial (Active)   Site Assessment Clean;Dry;Intact;No redness;No swelling 9/28/2017  3:00 AM   Line Status Pulsatile blood flow 9/28/2017  3:00 AM   Art Line Waveform Appropriate 9/28/2017  3:00 AM   Arterial Line Interventions Zeroed and calibrated 9/28/2017  3:00 AM   Color/Movement/Sensation Capillary refill less than 3 sec 9/28/2017  3:00 AM   Dressing Type Transparent  9/28/2017  3:00 AM   Dressing Status Biopatch in place;Clean;Dry;Intact 9/28/2017  3:00 AM   Dressing Intervention Dressing reinforced 9/28/2017  3:00 AM   Dressing Change Due 10/04/17 9/28/2017  3:00 AM           NG/OG Tube 09/25/17 1134 orogastric Center mouth (Active)   Placement Check placement verified by x-ray 9/28/2017  3:00 AM   Distal Tube Length (cm) 55 9/27/2017 11:05 AM   Tolerance no signs/symptoms of discomfort 9/28/2017  3:00 AM   Securement taped to commercial device 9/28/2017  3:00 AM   Clamp Status/Tolerance clamped 9/28/2017  3:00 AM   Suction Setting/Drainage Method dependent drainage 9/28/2017  3:00 AM   Insertion Site Appearance no redness, warmth, tenderness, skin breakdown, drainage 9/28/2017  3:00 AM   Flush/Irrigation flushed w/;water;no resistance met 9/28/2017  3:00 AM   Current Rate (mL/hr) 0 mL/hr 9/27/2017 11:05 AM   Intake (mL) 80 mL 9/28/2017  9:00 AM   Intake (mL) - Breast Milk Tube Feeding 100 9/27/2017  9:05 AM     Nutrition/Tube Feeds (if NPO state why): repeat ayr/lipase      Labs:  ABG:   Recent Labs  Lab 09/28/17  0309   PH 7.461*   PO2 235*   PCO2 40.6   HCO3 28.9*   POCSATURATED 100   BE 5     BMP:  Recent Labs  Lab 09/28/17  0300      K 3.9      CO2 27   BUN 20   CREATININE 6.5*   GLU 71   PHOS 4.0     LFT: Lab Results   Component Value Date    AST 87 (H) 09/25/2017    ALT 49 (H) 09/25/2017    ALKPHOS 112 09/25/2017    BILITOT 0.5 09/25/2017    ALBUMIN 1.6 (L) 09/28/2017    PROT 7.7 09/25/2017     CBC:   Lab Results   Component Value Date    WBC 4.45 09/28/2017    HGB 6.3 (L) 09/28/2017    HCT 19.5 (LL) 09/28/2017    MCV 97 09/28/2017    PLT 58 (L) 09/28/2017     Microbiology x 7d:   Microbiology Results (last 7 days)     Procedure Component Value Units Date/Time    Blood culture [[id number]]     Order Status:  No result Specimen:  Blood     Blood culture [[id number]]     Order Status:  Canceled Specimen:  Blood     Blood culture [[id number]]     Order Status:   Canceled Specimen:  Blood     Culture, Respiratory with Gram Stain [[id number]]     Order Status:  Canceled Specimen:  Respiratory     CSF culture [[id number]] Collected:  09/26/17 1442    Order Status:  Completed Specimen:  CSF (Spinal Fluid) from CSF Tap, Tube 3 Updated:  09/28/17 0757     CSF CULTURE No Growth to date     Gram Stain Result Cytospin indicates:      No WBC's      No organisms seen    Blood culture [[id number]] Collected:  09/24/17 0013    Order Status:  Completed Specimen:  Blood Updated:  09/28/17 0612     Blood Culture, Routine No Growth to date     Blood Culture, Routine No Growth to date     Blood Culture, Routine No Growth to date     Blood Culture, Routine No Growth to date     Blood Culture, Routine No Growth to date    Narrative:       OBTAIN from Central line please    Blood culture [[id number]] Collected:  09/23/17 2039    Order Status:  Completed Specimen:  Blood Updated:  09/27/17 2312     Blood Culture, Routine No Growth to date     Blood Culture, Routine No Growth to date     Blood Culture, Routine No Growth to date     Blood Culture, Routine No Growth to date     Blood Culture, Routine No Growth to date    Blood culture [[id number]] Collected:  09/23/17 2122    Order Status:  Completed Specimen:  Blood Updated:  09/27/17 2312     Blood Culture, Routine No Growth to date     Blood Culture, Routine No Growth to date     Blood Culture, Routine No Growth to date     Blood Culture, Routine No Growth to date     Blood Culture, Routine No Growth to date    Narrative:       Collection has been rescheduled by RNS at 9/23/2017 20:40 Reason: dr. garza in.  Collection has been rescheduled by RNS at 9/23/2017 20:48 Reason: dr.   still with pt.  Collection has been rescheduled by RNS at 9/23/2017 20:40 Reason: dr. garza in.  Collection has been rescheduled by RNS at 9/23/2017 20:48 Reason: dr.   still with pt.    AFB Culture & Smear [[id number]] Collected:  09/26/17 1442    Order Status:  Completed  Specimen:  CSF (Spinal Fluid) from CSF Tap, Tube 3 Updated:  09/27/17 2127     AFB Culture & Smear Culture in progress     AFB CULTURE STAIN No acid fast bacilli seen.    Blood culture [[id number]] Collected:  09/27/17 0617    Order Status:  Completed Specimen:  Blood from Peripheral, Antecubital, Right Updated:  09/27/17 1515     Blood Culture, Routine No Growth to date    Narrative:       Blood cultures from 2 different sites. 4 bottles total.  Please draw before starting antibiotics.    Blood culture [[id number]] Collected:  09/27/17 0625    Order Status:  Completed Specimen:  Blood from Peripheral, Foot, Left Updated:  09/27/17 1515     Blood Culture, Routine No Growth to date    Narrative:       Blood cultures x 2 different sites. 4 bottles total. Please  draw cultures before administering antibiotics.    Culture, Respiratory with Gram Stain [[id number]] Collected:  09/27/17 0709    Order Status:  Completed Specimen:  Respiratory from Endotracheal Aspirate Updated:  09/27/17 1103     Gram Stain (Respiratory) <10 epithelial cells per low power field.     Gram Stain (Respiratory) Many WBC's     Gram Stain (Respiratory) No organisms seen    Narrative:       Mini-BAL.    Cryptococcal antigen, CSF [[id number]] Collected:  09/26/17 1442    Order Status:  Completed Specimen:  CSF (Spinal Fluid) from CSF Tap, Tube 3 Updated:  09/27/17 1009     Crypto Ag, CSF Negative    AFB Culture & Smear [[id number]] Collected:  09/26/17 0611    Order Status:  Completed Specimen:  Blood from Blood Updated:  09/27/17 0927     AFB Culture & Smear Culture in progress    Gram stain [[id number]] Collected:  09/26/17 1442    Order Status:  Canceled Specimen:  CSF (Spinal Fluid) from CSF Tap, Tube 3 Updated:  09/26/17 1616    Fungus Culture, Blood or Bone Marrow [[id number]] Collected:  09/26/17 0611    Order Status:  No result Specimen:  Blood from Arm, Left Updated:  09/26/17 0753    Fungus culture [[id number]] Collected:  09/26/17 0611    Order  Status:  Canceled Specimen:  Blood from Arm, Left Updated:  09/26/17 0627    Urine culture [[id number]]     Order Status:  Canceled Specimen:  Urine     Cryptococcal antigen [[id number]] Collected:  09/24/17 0351    Order Status:  Completed Specimen:  Blood Updated:  09/24/17 0833     Cryptococcal Ag, Blood Negative    Narrative:       Add on Cryptococcal antigen per Dr. Enma Song, order #[id number]   07:35  09/24/2017     Cryptococcal antigen [[id number]]     Order Status:  Completed Specimen:  Blood from Blood     AFB Culture & Smear [[id number]]     Order Status:  Sent Specimen:  Blood         Imaging:  CT chest:  1.  Multiple prominent and mildly enlarged periaortic lymph nodes.  These are of uncertain etiology and could possibly be reactive versus sequela of lymphoproliferative process.  Clinical correlation and further evaluation as warranted.    2.  Diffuse gallbladder wall thickening, which could be reactive secondary to underlying hepatic dysfunction.  If there is clinical concern for acute cholecystitis, further evaluation with ultrasound can be performed.  Mild periportal edema and mild hepatomegaly.    3.  Bibasilar atelectatic versus consolidative change, clinical correlation advised.  Endotracheal tube in place.    4.  Nonspecific small volume of free pelvic fluid.    5.  Multiple additional findings as detailed above.  I personally reviewed the above image.    ASSESSMENT/PLAN:     Active Hospital Problems    Diagnosis    *Status epilepticus    Acute respiratory failure with hypoxia and hypercapnia    Santiago coma scale total score 3-8    Alteration in skin integrity    Acute encephalopathy    AIDS    Essential hypertension    ESRD (end stage renal disease)    Thrombocytopenia      Plan:  abx  2 units prbc  Follow h/h  Follow ary/lipase  Follow exam  RRT per nephrology  Check serum transferrin  Lavage ngt      Critical secondary to Patient has a condition that poses threat to life and bodily  function:   Arf/anemia/seizures/discussed with epilepsy service    Cc time > 37 mins     Critical care was time spent personally by me on the following activities: development of treatment plan with patient or surrogate and bedside caregivers, discussions with consultants, evaluation of patient's response to treatment, examination of patient, ordering and performing treatments and interventions, ordering and review of laboratory studies, ordering and review of radiographic studies, pulse oximetry, re-evaluation of patient's condition. This critical care time did not overlap with that of any other provider or involve time for any procedures.

## 2017-09-29 PROBLEM — K86.1 CHRONIC PANCREATITIS: Status: ACTIVE | Noted: 2017-09-29

## 2017-09-29 LAB
ABSOLUTE CD3: 221 CELLS/UL (ref 700–2100)
ABSOLUTE CD8: 166 CELLS/UL (ref 200–900)
ALBUMIN SERPL BCP-MCNC: 1.7 G/DL
ALBUMIN SERPL BCP-MCNC: 1.8 G/DL
ALLENS TEST: ABNORMAL
AMYLASE SERPL-CCNC: 228 U/L
ANION GAP SERPL CALC-SCNC: 7 MMOL/L
ANION GAP SERPL CALC-SCNC: 8 MMOL/L
APTT BLDCRRT: 27.6 SEC
BACTERIA BLD CULT: NORMAL
BACTERIA SPEC AEROBE CULT: NORMAL
BASOPHILS # BLD AUTO: 0.02 K/UL
BASOPHILS NFR BLD: 0.4 %
BUN SERPL-MCNC: 6 MG/DL
BUN SERPL-MCNC: 7 MG/DL
CALCIUM SERPL-MCNC: 7.8 MG/DL
CALCIUM SERPL-MCNC: 7.8 MG/DL
CD3%: 73.8 % (ref 55–83)
CD3+CD4+ CELLS # BLD: 42 CELLS/UL (ref 300–1400)
CD3+CD4+ CELLS NFR BLD: 14 % (ref 28–57)
CD4/CD8 RATIO: 0.25 (ref 0.9–3.6)
CD8 % SUPPRESSOR T CELL: 55.3 % (ref 10–39)
CHLORIDE SERPL-SCNC: 106 MMOL/L
CHLORIDE SERPL-SCNC: 106 MMOL/L
CO2 SERPL-SCNC: 25 MMOL/L
CO2 SERPL-SCNC: 26 MMOL/L
CREAT SERPL-MCNC: 2.2 MG/DL
CREAT SERPL-MCNC: 2.6 MG/DL
CRYPTOC AB TITR SER: NORMAL TITER
CYTOMEGALOVIRUS SOURCE: NORMAL
DELSYS: ABNORMAL
DIFFERENTIAL METHOD: ABNORMAL
EBV DNA # SPEC NAA+PROBE: <390 CPY/ML
EBV DNA SPEC NAA+PROBE-LOG#: <2.6 LOG
EBV DNA SPEC QL NAA+PROBE: NOT DETECTED
EOSINOPHIL # BLD AUTO: 0 K/UL
EOSINOPHIL NFR BLD: 0 %
ERYTHROCYTE [DISTWIDTH] IN BLOOD BY AUTOMATED COUNT: 18.7 %
ERYTHROCYTE [SEDIMENTATION RATE] IN BLOOD BY WESTERGREN METHOD: 14 MM/H
EST. GFR  (AFRICAN AMERICAN): 31.2 ML/MIN/1.73 M^2
EST. GFR  (AFRICAN AMERICAN): 38.1 ML/MIN/1.73 M^2
EST. GFR  (NON AFRICAN AMERICAN): 26.9 ML/MIN/1.73 M^2
EST. GFR  (NON AFRICAN AMERICAN): 33 ML/MIN/1.73 M^2
FIO2: 40
FIO2: 40
FLOW: 4
FLOW: 8
GLUCOSE SERPL-MCNC: 74 MG/DL
GLUCOSE SERPL-MCNC: 76 MG/DL
GRAM STN SPEC: NORMAL
HCO3 UR-SCNC: 25.9 MMOL/L (ref 24–28)
HCO3 UR-SCNC: 28.3 MMOL/L (ref 24–28)
HCO3 UR-SCNC: 28.4 MMOL/L (ref 24–28)
HCT VFR BLD AUTO: 27.4 %
HEPARIN INDUCED THROMBOCYTOPENIA: NORMAL
HGB BLD-MCNC: 9.1 G/DL
HISTOPLASMA AG INTERP.: NEGATIVE
HISTOPLASMA RESULT: NORMAL NG/ML
INR PPP: 1
INTERPRETATION SERPL IFE-IMP: NORMAL
LIPASE SERPL-CCNC: 231 U/L
LYMPHOCYTES # BLD AUTO: 0.5 K/UL
LYMPHOCYTES NFR BLD: 10.5 %
Lab: <2.4 LOG (10) COPIES/ML
Lab: <250 COPIES/ML
Lab: NOT DETECTED
MAGNESIUM SERPL-MCNC: 1.7 MG/DL
MCH RBC QN AUTO: 29.8 PG
MCHC RBC AUTO-ENTMCNC: 33.2 G/DL
MCV RBC AUTO: 90 FL
MODE: ABNORMAL
MONOCYTES # BLD AUTO: 0.6 K/UL
MONOCYTES NFR BLD: 13.5 %
NEUTROPHILS # BLD AUTO: 3.6 K/UL
NEUTROPHILS NFR BLD: 75.2 %
PATHOLOGIST INTERPRETATION IFE: NORMAL
PCO2 BLDA: 38.7 MMHG (ref 35–45)
PCO2 BLDA: 39.1 MMHG (ref 35–45)
PCO2 BLDA: 42.1 MMHG (ref 35–45)
PEEP: 5
PH SMN: 7.43 [PH] (ref 7.35–7.45)
PH SMN: 7.43 [PH] (ref 7.35–7.45)
PH SMN: 7.47 [PH] (ref 7.35–7.45)
PHOSPHATE SERPL-MCNC: 2 MG/DL
PHOSPHATE SERPL-MCNC: 2.1 MG/DL
PLATELET # BLD AUTO: 65 K/UL
PMV BLD AUTO: 10.6 FL
PO2 BLDA: 139 MMHG (ref 80–100)
PO2 BLDA: 172 MMHG (ref 80–100)
PO2 BLDA: 174 MMHG (ref 80–100)
POC BE: 2 MMOL/L
POC BE: 4 MMOL/L
POC BE: 5 MMOL/L
POC SATURATED O2: 100 % (ref 95–100)
POC SATURATED O2: 100 % (ref 95–100)
POC SATURATED O2: 99 % (ref 95–100)
POC TCO2: 27 MMOL/L (ref 23–27)
POC TCO2: 30 MMOL/L (ref 23–27)
POC TCO2: 30 MMOL/L (ref 23–27)
POCT GLUCOSE: 80 MG/DL (ref 70–110)
POTASSIUM SERPL-SCNC: 4.4 MMOL/L
POTASSIUM SERPL-SCNC: 4.4 MMOL/L
PROTHROMBIN TIME: 10.9 SEC
RBC # BLD AUTO: 3.05 M/UL
SAMPLE: ABNORMAL
SITE: ABNORMAL
SODIUM SERPL-SCNC: 139 MMOL/L
SODIUM SERPL-SCNC: 139 MMOL/L
SP02: 100
SP02: 97
SPECIMEN SOURCE: NORMAL
SPECIMEN SOURCE: NORMAL
T GONDII DNA CSF QL NAA+PROBE: NEGATIVE
VT: 450
WBC # BLD AUTO: 4.74 K/UL

## 2017-09-29 PROCEDURE — 95951 HC EEG MONITORING/VIDEO RECORD: CPT

## 2017-09-29 PROCEDURE — 20000000 HC ICU ROOM

## 2017-09-29 PROCEDURE — 85730 THROMBOPLASTIN TIME PARTIAL: CPT

## 2017-09-29 PROCEDURE — 82150 ASSAY OF AMYLASE: CPT

## 2017-09-29 PROCEDURE — 85610 PROTHROMBIN TIME: CPT

## 2017-09-29 PROCEDURE — 63600175 PHARM REV CODE 636 W HCPCS: Performed by: NURSE PRACTITIONER

## 2017-09-29 PROCEDURE — 27200966 HC CLOSED SUCTION SYSTEM

## 2017-09-29 PROCEDURE — 80069 RENAL FUNCTION PANEL: CPT

## 2017-09-29 PROCEDURE — 80069 RENAL FUNCTION PANEL: CPT | Mod: 91

## 2017-09-29 PROCEDURE — 99900035 HC TECH TIME PER 15 MIN (STAT)

## 2017-09-29 PROCEDURE — 95951 PR EEG MONITORING/VIDEORECORD: CPT | Mod: 26,,, | Performed by: PSYCHIATRY & NEUROLOGY

## 2017-09-29 PROCEDURE — 25000003 PHARM REV CODE 250: Performed by: INTERNAL MEDICINE

## 2017-09-29 PROCEDURE — 85025 COMPLETE CBC W/AUTO DIFF WBC: CPT

## 2017-09-29 PROCEDURE — 83690 ASSAY OF LIPASE: CPT

## 2017-09-29 PROCEDURE — 83735 ASSAY OF MAGNESIUM: CPT

## 2017-09-29 PROCEDURE — 25000003 PHARM REV CODE 250: Performed by: PSYCHIATRY & NEUROLOGY

## 2017-09-29 PROCEDURE — 82803 BLOOD GASES ANY COMBINATION: CPT

## 2017-09-29 PROCEDURE — 99232 SBSQ HOSP IP/OBS MODERATE 35: CPT | Mod: ,,, | Performed by: NURSE PRACTITIONER

## 2017-09-29 PROCEDURE — 63600175 PHARM REV CODE 636 W HCPCS: Performed by: PHYSICIAN ASSISTANT

## 2017-09-29 PROCEDURE — 99291 CRITICAL CARE FIRST HOUR: CPT | Mod: ,,, | Performed by: ANESTHESIOLOGY

## 2017-09-29 PROCEDURE — 25000003 PHARM REV CODE 250: Performed by: PHYSICIAN ASSISTANT

## 2017-09-29 PROCEDURE — 25000003 PHARM REV CODE 250: Performed by: NURSE PRACTITIONER

## 2017-09-29 PROCEDURE — 63600175 PHARM REV CODE 636 W HCPCS: Performed by: INTERNAL MEDICINE

## 2017-09-29 PROCEDURE — 87385 HISTOPLASMA CAPSUL AG IA: CPT

## 2017-09-29 PROCEDURE — 37799 UNLISTED PX VASCULAR SURGERY: CPT

## 2017-09-29 PROCEDURE — 99233 SBSQ HOSP IP/OBS HIGH 50: CPT | Mod: GC,,, | Performed by: INTERNAL MEDICINE

## 2017-09-29 PROCEDURE — 95957 EEG DIGITAL ANALYSIS: CPT

## 2017-09-29 RX ORDER — LEUCOVORIN CALCIUM 25 MG/1
25 TABLET ORAL WEEKLY
Status: DISCONTINUED | OUTPATIENT
Start: 2017-10-03 | End: 2017-09-29

## 2017-09-29 RX ORDER — PYRIMETHAMINE 25 MG/1
50 TABLET ORAL WEEKLY
Status: DISCONTINUED | OUTPATIENT
Start: 2017-10-03 | End: 2017-09-29

## 2017-09-29 RX ORDER — SULFAMETHOXAZOLE AND TRIMETHOPRIM 800; 160 MG/1; MG/1
1 TABLET ORAL
Status: DISCONTINUED | OUTPATIENT
Start: 2017-09-29 | End: 2017-10-05 | Stop reason: HOSPADM

## 2017-09-29 RX ORDER — MAGNESIUM SULFATE HEPTAHYDRATE 40 MG/ML
4 INJECTION, SOLUTION INTRAVENOUS
Status: DISCONTINUED | OUTPATIENT
Start: 2017-09-29 | End: 2017-10-03

## 2017-09-29 RX ORDER — MAGNESIUM SULFATE HEPTAHYDRATE 40 MG/ML
2 INJECTION, SOLUTION INTRAVENOUS
Status: DISCONTINUED | OUTPATIENT
Start: 2017-09-29 | End: 2017-10-03

## 2017-09-29 RX ORDER — DAPSONE 25 MG/1
50 TABLET ORAL DAILY
Status: DISCONTINUED | OUTPATIENT
Start: 2017-09-30 | End: 2017-09-29

## 2017-09-29 RX ADMIN — MEROPENEM AND SODIUM CHLORIDE 1 G: 1 INJECTION, SOLUTION INTRAVENOUS at 05:09

## 2017-09-29 RX ADMIN — AMPHOTERICIN B 200 MG: 50 INJECTION, POWDER, LYOPHILIZED, FOR SOLUTION INTRAVENOUS at 08:09

## 2017-09-29 RX ADMIN — MEROPENEM AND SODIUM CHLORIDE 1 G: 1 INJECTION, SOLUTION INTRAVENOUS at 06:09

## 2017-09-29 RX ADMIN — DAPSONE 50 MG: 25 TABLET ORAL at 09:09

## 2017-09-29 RX ADMIN — CHLORHEXIDINE GLUCONATE 15 ML: 1.2 RINSE ORAL at 09:09

## 2017-09-29 RX ADMIN — ACYCLOVIR SODIUM 710 MG: 1000 INJECTION, SOLUTION INTRAVENOUS at 05:09

## 2017-09-29 RX ADMIN — LEVETIRACETAM 500 MG: 500 SOLUTION ORAL at 08:09

## 2017-09-29 RX ADMIN — ATORVASTATIN CALCIUM 10 MG: 10 TABLET, FILM COATED ORAL at 09:09

## 2017-09-29 RX ADMIN — LEVETIRACETAM 500 MG: 500 SOLUTION ORAL at 09:09

## 2017-09-29 RX ADMIN — HYDRALAZINE HYDROCHLORIDE 100 MG: 50 TABLET ORAL at 05:09

## 2017-09-29 RX ADMIN — SULFAMETHOXAZOLE AND TRIMETHOPRIM 1 TABLET: 800; 160 TABLET ORAL at 06:09

## 2017-09-29 RX ADMIN — CHLORHEXIDINE GLUCONATE 15 ML: 1.2 RINSE ORAL at 08:09

## 2017-09-29 RX ADMIN — AMLODIPINE BESYLATE 5 MG: 5 TABLET ORAL at 08:09

## 2017-09-29 RX ADMIN — HYDRALAZINE HYDROCHLORIDE 100 MG: 50 TABLET ORAL at 03:09

## 2017-09-29 RX ADMIN — HYDRALAZINE HYDROCHLORIDE 100 MG: 50 TABLET ORAL at 09:09

## 2017-09-29 RX ADMIN — FAMOTIDINE 20 MG: 20 TABLET, FILM COATED ORAL at 08:09

## 2017-09-29 NOTE — PROGRESS NOTES
ICU Progress Note  Neurocritical Care    Admit Date: 9/23/2017  LOS: 6    CC: Status epilepticus    Code Status: Full Code     SUBJECTIVE:     Interval History/Significant Events:   Just self extubates  Acute resp failure  esrd  Chr pancreatitis; ary/lipase still rising  Anemia  hypopjophatemia  Sao2 94%  Large amount of resp secretions  abx    Medications:  Continuous Infusions:   Scheduled Meds:   amlodipine  5 mg Per NG tube Daily    amphotericin B liposome (AMBISOME) IVPB  3 mg/kg Intravenous Q24H    atorvastatin  10 mg Per NG tube QHS    chlorhexidine  15 mL Mouth/Throat BID    famotidine  20 mg Per NG tube Daily    hydrALAZINE  100 mg Per NG tube Q8H    levetiracetam oral soln  500 mg Per NG tube BID    meropenem (MERREM) IVPB  1 g Intravenous Q12H     PRN Meds:.sodium chloride 0.9%, sodium chloride, sodium chloride, sodium chloride, acetaminophen, acetaminophen, hydrALAZINE, labetalol, magnesium sulfate IVPB    OBJECTIVE:   Vital Signs (Most Recent):   Temp: 99 °F (37.2 °C) (09/29/17 0705)  Pulse: (!) 132 (09/29/17 1015)  Resp: (!) 29 (09/29/17 1015)  BP: (!) 144/81 (09/28/17 1025)  SpO2: 95 % (09/29/17 1015)    Vital Signs (24h Range):   Temp:  [96.1 °F (35.6 °C)-99.9 °F (37.7 °C)] 99 °F (37.2 °C)  Pulse:  [] 132  Resp:  [14-29] 29  SpO2:  [95 %-100 %] 95 %  BP: (144)/(81) 144/81  Arterial Line BP: (116-162)/(62-93) 129/77    ICP/CPP (Last 24h):        I & O (Last 24h):   Intake/Output Summary (Last 24 hours) at 09/29/17 1019  Last data filed at 09/29/17 0900   Gross per 24 hour   Intake          3121.67 ml   Output             4038 ml   Net          -916.33 ml     Physical Exam:  GA: Alert, comfortable, no acute distress.   HEENT: No scleral icterus or JVD.   Pulmonary: Clear to auscultation A/P/L. No wheezing, crackles, or rhonchi.  Cardiac: RRR S1 & S2 w/o rubs/murmurs/gallops.   Abdominal: Bowel sounds present x 4. No appreciable hepatosplenomegaly.  Skin: No jaundice, rashes, or visible  lesions.  Neuro:  Awake  Alert  Follows commands  Pupils 3 mm Reactive    Vent Data:   Vent Mode: A/C  Oxygen Concentration (%):  [40] 40  Resp Rate Total:  [14 br/min-59 br/min] 27 br/min  Vt Set:  [450 mL] 450 mL  PEEP/CPAP:  [5 cmH20] 5 cmH20  Pressure Support:  [0 cmH20] 0 cmH20  Mean Airway Pressure:  [7.6 ouI92-58 cmH20] 8.3 cmH20    Lines/Drains/Airway:   RIJ : RRT cath       Tunneled Central Line Insertion/Assessment - Double Lumen  09/23/17 1100 right subclavian (Active)   Dressing biopatch in place;dressing dry and intact 9/29/2017  7:05 AM   IV Device Securement sutures 9/29/2017  7:05 AM   Additional Site Signs no erythema;no warmth;no edema;no pain;no drainage;no streak formation 9/29/2017  7:05 AM   Distal Patency/Care flushed w/o difficulty;normal saline locked 9/29/2017  7:05 AM   Proximal Patency/Care flushed w/o difficulty;normal saline locked 9/29/2017  7:05 AM   Dressing Change Due 10/03/17 9/29/2017  7:05 AM   Daily Line Review Performed 9/29/2017  7:05 AM           Arterial Line 09/27/17 1808 Left Radial (Active)   Site Assessment Clean;Dry;Intact;No redness;No swelling 9/29/2017  7:05 AM   Line Status Pulsatile blood flow 9/29/2017  7:05 AM   Art Line Waveform Appropriate;Square wave test performed 9/29/2017  7:05 AM   Arterial Line Interventions Zeroed and calibrated;Leveled;Connections checked and tightened;Flushed per protocol 9/29/2017  7:05 AM   Color/Movement/Sensation Capillary refill less than 3 sec 9/29/2017  7:05 AM   Dressing Type Transparent 9/29/2017  7:05 AM   Dressing Status Biopatch in place;Clean;Dry;Intact 9/29/2017  7:05 AM   Dressing Intervention Dressing reinforced 9/29/2017  7:05 AM   Dressing Change Due 10/04/17 9/29/2017  7:05 AM           NG/OG Tube 09/25/17 1134 orogastric Center mouth (Active)   Placement Check placement verified by x-ray 9/29/2017  7:05 AM   Distal Tube Length (cm) 55 9/29/2017  7:05 AM   Tolerance no signs/symptoms of discomfort 9/29/2017  7:05 AM    Securement taped to commercial device 9/29/2017  7:05 AM   Clamp Status/Tolerance clamped 9/29/2017  7:05 AM   Suction Setting/Drainage Method dependent drainage 9/29/2017  3:00 AM   Insertion Site Appearance no redness, warmth, tenderness, skin breakdown, drainage 9/29/2017  7:05 AM   Flush/Irrigation flushed w/;water;no resistance met 9/29/2017  7:05 AM   Current Rate (mL/hr) 0 mL/hr 9/29/2017  7:05 AM   Intake (mL) 100 mL 9/29/2017  9:00 AM   Intake (mL) - Breast Milk Tube Feeding 100 9/27/2017  9:05 AM     Nutrition/Tube Feeds (if NPO state why): npo    Labs:  ABG:   Recent Labs  Lab 09/29/17 0419   PH 7.435   PO2 174*   PCO2 42.1   HCO3 28.3*   POCSATURATED 100   BE 4     BMP:  Recent Labs  Lab 09/29/17 0413 09/29/17 0914     --    K 4.4  --      --    CO2 25  --    BUN 6  --    CREATININE 2.2*  --    GLU 74  --    MG  --  1.7   PHOS 2.0*  --      LFT: Lab Results   Component Value Date    AST 87 (H) 09/25/2017    ALT 49 (H) 09/25/2017    ALKPHOS 112 09/25/2017    BILITOT 0.5 09/25/2017    ALBUMIN 1.7 (L) 09/29/2017    PROT 7.7 09/25/2017     CBC:   Lab Results   Component Value Date    WBC 4.74 09/29/2017    HGB 9.1 (L) 09/29/2017    HCT 27.4 (L) 09/29/2017    MCV 90 09/29/2017    PLT 65 (L) 09/29/2017     Microbiology x 7d:   Microbiology Results (last 7 days)     Procedure Component Value Units Date/Time    Blood culture [[id number]] Collected:  09/27/17 0617    Order Status:  Completed Specimen:  Blood from Peripheral, Antecubital, Right Updated:  09/29/17 1012     Blood Culture, Routine No Growth to date     Blood Culture, Routine No Growth to date     Blood Culture, Routine No Growth to date    Narrative:       Blood cultures from 2 different sites. 4 bottles total.  Please draw before starting antibiotics.    Blood culture [[id number]] Collected:  09/27/17 0625    Order Status:  Completed Specimen:  Blood from Peripheral, Foot, Left Updated:  09/29/17 1012     Blood Culture, Routine No  Growth to date     Blood Culture, Routine No Growth to date     Blood Culture, Routine No Growth to date    Narrative:       Blood cultures x 2 different sites. 4 bottles total. Please  draw cultures before administering antibiotics.    CSF culture [[id number]] Collected:  09/26/17 1442    Order Status:  Completed Specimen:  CSF (Spinal Fluid) from CSF Tap, Tube 3 Updated:  09/29/17 0742     CSF CULTURE No Growth to date     Gram Stain Result Cytospin indicates:      No WBC's      No organisms seen    Blood culture [[id number]] Collected:  09/24/17 0013    Order Status:  Completed Specimen:  Blood Updated:  09/29/17 0612     Blood Culture, Routine No growth after 5 days.    Narrative:       OBTAIN from Central line please    Blood culture [[id number]] Collected:  09/23/17 2122    Order Status:  Completed Specimen:  Blood Updated:  09/28/17 2312     Blood Culture, Routine No growth after 5 days.    Narrative:       Collection has been rescheduled by RNS at 9/23/2017 20:40 Reason: dr. garza in.  Collection has been rescheduled by RNS at 9/23/2017 20:48 Reason: dr.   still with pt.  Collection has been rescheduled by RNS at 9/23/2017 20:40 Reason: dr. garza in.  Collection has been rescheduled by RNS at 9/23/2017 20:48 Reason: dr.   still with pt.    Blood culture [[id number]] Collected:  09/23/17 2039    Order Status:  Completed Specimen:  Blood Updated:  09/28/17 2312     Blood Culture, Routine No growth after 5 days.    Blood culture [[id number]] Collected:  09/28/17 1030    Order Status:  Completed Specimen:  Blood from Line, Subclavian, Right Updated:  09/28/17 1915     Blood Culture, Routine No Growth to date    Narrative:       Please draw from R tunneled catheter    Culture, Respiratory with Gram Stain [[id number]] Collected:  09/27/17 0709    Order Status:  Completed Specimen:  Respiratory from Endotracheal Aspirate Updated:  09/28/17 1127     Respiratory Culture Normal respiratory brittany     Gram Stain  (Respiratory) <10 epithelial cells per low power field.     Gram Stain (Respiratory) Many WBC's     Gram Stain (Respiratory) No organisms seen    Narrative:       Mini-BAL.    Blood culture [[id number]]     Order Status:  Canceled Specimen:  Blood     Blood culture [[id number]]     Order Status:  Canceled Specimen:  Blood     Culture, Respiratory with Gram Stain [[id number]]     Order Status:  Canceled Specimen:  Respiratory     AFB Culture & Smear [[id number]] Collected:  09/26/17 1442    Order Status:  Completed Specimen:  CSF (Spinal Fluid) from CSF Tap, Tube 3 Updated:  09/27/17 2127     AFB Culture & Smear Culture in progress     AFB CULTURE STAIN No acid fast bacilli seen.    Cryptococcal antigen, CSF [[id number]] Collected:  09/26/17 1442    Order Status:  Completed Specimen:  CSF (Spinal Fluid) from CSF Tap, Tube 3 Updated:  09/27/17 1009     Crypto Ag, CSF Negative    AFB Culture & Smear [[id number]] Collected:  09/26/17 0611    Order Status:  Completed Specimen:  Blood from Blood Updated:  09/27/17 0927     AFB Culture & Smear Culture in progress    Gram stain [[id number]] Collected:  09/26/17 1442    Order Status:  Canceled Specimen:  CSF (Spinal Fluid) from CSF Tap, Tube 3 Updated:  09/26/17 1616    Fungus Culture, Blood or Bone Marrow [[id number]] Collected:  09/26/17 0611    Order Status:  No result Specimen:  Blood from Arm, Left Updated:  09/26/17 0753    Fungus culture [[id number]] Collected:  09/26/17 0611    Order Status:  Canceled Specimen:  Blood from Arm, Left Updated:  09/26/17 0627    Urine culture [[id number]]     Order Status:  Canceled Specimen:  Urine     Cryptococcal antigen [[id number]] Collected:  09/24/17 0351    Order Status:  Completed Specimen:  Blood Updated:  09/24/17 0833     Cryptococcal Ag, Blood Negative    Narrative:       Add on Cryptococcal antigen per Dr. Enma Song, order #[id number]   07:35  09/24/2017     Cryptococcal antigen [[id number]]     Order Status:  Completed  Specimen:  Blood from Blood     AFB Culture & Smear [[id number]]     Order Status:  Sent Specimen:  Blood         Imaging:  MRI:  Disruption of the soft tissue and increased distance between the interspinous processes of C5 and C6 with fatty infiltration likely represents remote posttraumatic interspinous ligament injury at the region.     Ill-defined 0.9 cm STIR hyperintense signal of the cord is noted with cord diminutive caliber/atrophy at the level of  C6 possibly represents myelomalacia. Other differentials include demyelinating processes or possibly related to the patient's HIV history.    Diffuse hypointense signal of the bone marrow on T1 likely represents an infiltrative or degenerative process in this patient with a history of HIV, end-stage renal disease, and anemia.    Moderate cervical spondylosis worst at C6-C7, as described above.  I personally reviewed the above image.    ASSESSMENT/PLAN:     Active Hospital Problems    Diagnosis    *Status epilepticus    Chronic pancreatitis    Acute respiratory failure with hypoxia and hypercapnia    [place] coma scale total score 3-8    Alteration in skin integrity    Acute encephalopathy    AIDS    Essential hypertension    ESRD (end stage renal disease)    Thrombocytopenia        Plan:  Follow abg  Follow secretions  Check cxr  Very concerned about resp status; may need reintubation  Follow ID recs  Follow urine for histoplasmosis    Critical secondary to Patient has a condition that poses threat to life and bodily function: arf/seizures/follow cxr/abg/resp status      Total cc time > 39 mins     Critical care was time spent personally by me on the following activities: development of treatment plan with patient or surrogate and bedside caregivers, discussions with consultants, evaluation of patient's response to treatment, examination of patient, ordering and performing treatments and interventions, ordering and review of laboratory studies, ordering  and review of radiographic studies, pulse oximetry, re-evaluation of patient's condition. This critical care time did not overlap with that of any other provider or involve time for any procedures.

## 2017-09-29 NOTE — PROGRESS NOTES
NCC notified of pt's HR remaining above 120 for over 5 min. 50 mcg of fentanyl ordered. Will administer when approved by pharmacy and continue to monitor pt.

## 2017-09-29 NOTE — PROGRESS NOTES
[facility]  Nephrology  Progress Note    Patient Name: Volodymyr Pope  MRN: [id number]  Admission Date: 9/23/2017  Hospital Length of Stay: 6 days  Attending Provider: Julio Granger MD   Primary Care Physician: Bulmaro Flores  Principal Problem:Status epilepticus    Subjective:     HPI: 51 yearold male with AIDS (CD 49 not on ART or PPx), ESRD on HD (M,W,F), and seizure disorder presents as a transfer from [facility] for Neurology eval and LP.  Patient presented to OSH on 9/15 from a dialysis center for HTN.  He was admitted for HTN urgency and routine workup revealed thrombocytopenia of 19,000.  His hospital course was complicated by AMS with lethargy.  MRI revealed multiple areas concerning for embolic strokes with seziure activity.  Neurology initially felt his AMS and resulting seizure was 2/2 to stroke and started him on Keppra, ASA and stain.  His mental status initially improved however he was found to be HTN with a fever and spent a brief time in the ICU.  Patient was started on ceftriaxone and Vanc given concern for UTI.  After stepping down to the floor patient was once again found to be lethargic and confused.  Neurology wished to pursue LP but due to platelet count of 52,000 they felt uncomfortable due to lack of Neurosurgery and this is the reason for transfer to [facility].  Other PMH significant for poorly controlled HTN and HIV/?AIDS with uncertain compliance.  Patient reportedly initially was Aox4, but when I see him he is not alert, not answering questions, arousable, but barely.     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    Prior to hospital admit in [place] he was receiving HD at [facility] in [place] on MWF through a R sided tunneled access line (reportedly w/o access problems and access placed in May), his nephrologist in [place] is Dr Hammond, treatment duration 4hrs, EDW: 78 kg, Normal UF 1-2 L, he does have residual renal function, uncertain how much, although noted he has made about 600ccs of urine overnight.    He reportedly received dialysis this past Friday, his lytes, acid/base and volume status are stable this morning, he is actually net negative 400ccs overnight, and appears to be below his EDW.      Interval History:   SLED overnight x 12 hours, net UF of 500 ml/24h.  Electrolytes stable.  Appears more alert this morning on exam.  Vent settings stable.    Review of patient's allergies indicates:  No Known Allergies  Current Facility-Administered Medications   Medication Frequency    0.9%  NaCl infusion (CRRT USE ONLY) PRN    0.9%  NaCl infusion (for blood administration) Q24H PRN    0.9%  NaCl infusion (for blood administration) Q24H PRN    0.9%  NaCl infusion (for blood administration) Q24H PRN    acetaminophen oral solution 650 mg Q6H PRN    acetaminophen tablet 500 mg Daily PRN    acyclovir (ZOVIRAX) 710 mg in dextrose 5 % 250 mL IVPB Daily    amlodipine tablet 5 mg Daily    amphotericin B liposome (AMBISOME) 200 mg in dextrose 5 % 200 mL IVPB Q24H    atorvastatin tablet 10 mg QHS    chlorhexidine 0.12 % solution 15 mL BID    dapsone tablet 50 mg Daily    diphenhydrAMINE capsule 25 mg Daily PRN    famotidine tablet 20 mg Daily    hydrALAZINE injection 10 mg Q4H PRN    hydrALAZINE tablet 100 mg Q8H    labetalol injection 10 mg Q4H PRN    leucovorin tablet 25 mg Weekly    levetiracetam oral soln Soln 500 mg BID    magnesium sulfate 2g in water 50mL IVPB (premix) PRN    meropenem-0.9% sodium chloride 1 g/50 mL IVPB Q12H    potassium, sodium phosphates 280-160-250 mg packet 2 packet PRN     potassium, sodium phosphates 280-160-250 mg packet 2 packet PRN    potassium, sodium phosphates 280-160-250 mg packet 2 packet PRN    pyrimethamine tablet 50 mg Weekly       Objective:     Vital Signs (Most Recent):  Temp: 99 °F (37.2 °C) (09/29/17 0705)  Pulse: (!) 123 (09/29/17 0909)  Resp: 20 (09/29/17 0909)  BP: (!) 144/81 (09/28/17 1025)  SpO2: 100 % (09/29/17 0909)  O2 Device (Oxygen Therapy): ventilator (09/29/17 0909) Vital Signs (24h Range):  Temp:  [96.1 °F (35.6 °C)-99.9 °F (37.7 °C)] 99 °F (37.2 °C)  Pulse:  [] 123  Resp:  [14-27] 20  SpO2:  [99 %-100 %] 100 %  BP: (144)/(81) 144/81  Arterial Line BP: (116-162)/(62-93) 134/72     Weight: 78.8 kg (173 lb 11.6 oz) (09/27/17 0705)  Body mass index is 23.56 kg/m².  Body surface area is 2 meters squared.    I/O last 3 completed shifts:  In: 3991.7 [I.V.:2295; Blood:626.7; NG/GT:220; IV Piggyback:850]  Out: 4038 [Other:4038]    Physical Exam   Constitutional: He appears well-developed and well-nourished.   HENT:   Head: Normocephalic and atraumatic.   Eyes: Pupils are equal, round, and reactive to light.   Neck: Neck supple.   Cardiovascular: Normal rate, regular rhythm and normal heart sounds.  Exam reveals no friction rub.    No murmur heard.  Pulmonary/Chest: Effort normal and breath sounds normal. He has no wheezes. He has no rales.   intubated   Abdominal: Soft. Bowel sounds are normal. He exhibits no distension. There is no tenderness. There is no guarding.   Musculoskeletal: He exhibits no edema or deformity.   Neurological: He is alert.   Opens eyes to voice. Follows simple commands.   Skin: Skin is dry. No rash noted. No erythema.       Significant Labs:  ABGs:   Recent Labs  Lab 09/29/17 0419   PH 7.435   PCO2 42.1   HCO3 28.3*   POCSATURATED 100   BE 4     CBC:   Recent Labs  Lab 09/29/17 0215   WBC 4.74   RBC 3.05*   HGB 9.1*   HCT 27.4*   PLT 65*   MCV 90   MCH 29.8   MCHC 33.2     CMP:   Recent Labs  Lab 09/25/17  1514  09/29/17  0417    GLU 91  < > 74   CALCIUM 7.8*  < > 7.8*   ALBUMIN 2.0*  < > 1.7*   PROT 7.7  --   --      < > 139   K 4.8  < > 4.4   CO2 23  < > 25   CL 98  < > 106   BUN 53*  < > 6   CREATININE 13.3*  < > 2.2*   ALKPHOS 112  --   --    ALT 49*  --   --    AST 87*  --   --    BILITOT 0.5  --   --    < > = values in this interval not displayed.         Assessment/Plan:     ESRD (end stage renal disease)    -Patient is ESRD on HD since May 2017? Underlying etiology uncertain, ?HIV nephropathy, reportedly last received session on Friday.   -MRI concern for cerebritis or other infection. ID following, LP performed.    -SLED overnight, tolerated well, net negative 500 ml/24h.  -electrolytes stable, volume status acceptable.  Minimal vent settings.  -No need for RRT today.  Will continue following and reassess for needs daily.            Andrews Garcia NP  Nephrology  [facility]  Pager:  [phone]

## 2017-09-29 NOTE — ASSESSMENT & PLAN NOTE
Case of 51 y/o male with PMHX of HIV currently AIDS not treated at this time with no follow up in out patient to our knowledge. Patient CD4 count from OSH 49. CD4 count on 9/23 18.4. Which requires PCP and Toxoplasma and MAC prophylaxis.     - Would recommend to adjust coverage:  discontinue dapsone, leucovorine and pyrimethamine and start Bactrim DS once daily PO to cover PCP and Toxoplasma prophylaxis.   - recommend discontinuing meropenam  - HAART not indicated until we determine status of current lethargic state and rule out infectious causes. Begin HAART can cause an aggressive immune response also known as IRIS. It can also worcen HIV encephalopathy and PML that is possible etiology as suggested by MRI results.   - recommend Azythromycin for MAC prophylaxis   - Previously recommended Hepatitis Panel. Urinary histoplasmosis ag pending.  CT chest/abdo reveals many prominent and mildly enlarged periaortic lymph nodes.    - EBV PCR negative; CMV PCR negative; JCV IgG pending.  FTA reactive minimal.  - on amphotericin, continue at least until histo antigen results.

## 2017-09-29 NOTE — SUBJECTIVE & OBJECTIVE
Past Medical History:   Diagnosis Date    ESRD on hemodialysis     HIV (human immunodeficiency virus infection)     Seizures        Past Surgical History:   Procedure Laterality Date    LUMBAR PUNCTURE  9/26/2017            Review of patient's allergies indicates:  No Known Allergies    Medications:  Prescriptions Prior to Admission   Medication Sig    cloNIDine (CATAPRES) 0.1 MG tablet Take 0.1 mg by mouth 2 (two) times daily.    hydrALAZINE (APRESOLINE) 50 MG tablet Take 50 mg by mouth every 6 (six) hours.     lamivudine-zidovudine 150-300mg (COMBIVIR) 150-300 mg Tab Take 1 tablet by mouth every 12 (twelve) hours.    levetiracetam (KEPPRA) 1000 MG tablet Take 500 mg by mouth every Mon, Wed, Fri. Take after dialysis.     Antibiotics     Start     Stop Route Frequency Ordered    09/30/17 0900  dapsone tablet 50 mg      -- PER NG TUBE Daily 09/29/17 1126    09/26/17 0600  meropenem-0.9% sodium chloride 1 g/50 mL IVPB      -- IV Every 12 hours (non-standard times) 09/26/17 0449        Antifungals     Start     Stop Route Frequency Ordered    09/27/17 0830  amphotericin B liposome (AMBISOME) 200 mg in dextrose 5 % 200 mL IVPB  (amphotericin B liposome (AMBISOME) IVPB panel)      -- IV Every 24 hours (non-standard times) 09/27/17 0648        Antivirals     None             There is no immunization history on file for this patient.    Family History     Family history is unknown by patient.        Social History     Social History    Marital status:      Spouse name: N/A    Number of children: N/A    Years of education: N/A     Social History Main Topics    Smoking status: Never Smoker    Smokeless tobacco: Never Used    Alcohol use No    Drug use: No    Sexual activity: Yes     Partners: Female     Other Topics Concern    None     Social History Narrative    None     Review of Systems   Unable to perform ROS: Mental status change     Objective:     Vital Signs (Most Recent):  Temp: 99.5 °F (37.5  °C) (09/29/17 1105)  Pulse: 106 (09/29/17 1300)  Resp: 15 (09/29/17 1300)  BP: (!) 144/81 (09/28/17 1025)  SpO2: 99 % (09/29/17 1300) Vital Signs (24h Range):  Temp:  [96.8 °F (36 °C)-99.9 °F (37.7 °C)] 99.5 °F (37.5 °C)  Pulse:  [] 106  Resp:  [13-29] 15  SpO2:  [95 %-100 %] 99 %  Arterial Line BP: (116-158)/(62-91) 140/79     Weight: 78.8 kg (173 lb 11.6 oz)  Body mass index is 23.56 kg/m².    Estimated Creatinine Clearance: 42.6 mL/min (based on SCr of 2.2 mg/dL (H)).    Physical Exam   Constitutional: He appears well-developed and well-nourished.   Eyes: Pupils are equal, round, and reactive to light.   Neck: Neck supple.   Cardiovascular: Regular rhythm and normal heart sounds.  Exam reveals no friction rub.    No murmur heard.  tachycardic   Pulmonary/Chest: Effort normal and breath sounds normal. He has no wheezes. He has no rales.   Abdominal: Soft. Bowel sounds are normal. He exhibits no distension. There is no tenderness. There is no guarding.   Musculoskeletal: He exhibits no edema or deformity.   Neurological: He is alert.   More awake, can answer some yes or no questions   Skin: Skin is dry. No rash noted. No erythema.       Significant Labs:   Blood Culture:     Recent Labs  Lab 09/23/17  2122 09/24/17  0013 09/27/17  0617 09/27/17  0625 09/28/17  1030   LABBLOO No growth after 5 days. No growth after 5 days. No Growth to date  No Growth to date  No Growth to date No Growth to date  No Growth to date  No Growth to date No Growth to date  No Growth to date     BMP:     Recent Labs  Lab 09/29/17  0413 09/29/17  0914   GLU 74  --      --    K 4.4  --      --    CO2 25  --    BUN 6  --    CREATININE 2.2*  --    CALCIUM 7.8*  --    MG  --  1.7     C4 Count: No results for input(s): C4 in the last 48 hours.  CBC:     Recent Labs  Lab 09/28/17  0300 09/28/17  0455 09/29/17  0215   WBC 5.08 4.45 4.74   HGB 6.5* 6.3* 9.1*   HCT 19.8* 19.5* 27.4*   PLT 53* 58* 65*       Significant  Imaging: I have reviewed all pertinent imaging results/findings within the past 24 hours.

## 2017-09-29 NOTE — SUBJECTIVE & OBJECTIVE
Interval History:   SLED overnight x 12 hours, net UF of 500 ml/24h.  Electrolytes stable.  Appears more alert this morning on exam.  Vent settings stable.    Review of patient's allergies indicates:  No Known Allergies  Current Facility-Administered Medications   Medication Frequency    0.9%  NaCl infusion (CRRT USE ONLY) PRN    0.9%  NaCl infusion (for blood administration) Q24H PRN    0.9%  NaCl infusion (for blood administration) Q24H PRN    0.9%  NaCl infusion (for blood administration) Q24H PRN    acetaminophen oral solution 650 mg Q6H PRN    acetaminophen tablet 500 mg Daily PRN    acyclovir (ZOVIRAX) 710 mg in dextrose 5 % 250 mL IVPB Daily    amlodipine tablet 5 mg Daily    amphotericin B liposome (AMBISOME) 200 mg in dextrose 5 % 200 mL IVPB Q24H    atorvastatin tablet 10 mg QHS    chlorhexidine 0.12 % solution 15 mL BID    dapsone tablet 50 mg Daily    diphenhydrAMINE capsule 25 mg Daily PRN    famotidine tablet 20 mg Daily    hydrALAZINE injection 10 mg Q4H PRN    hydrALAZINE tablet 100 mg Q8H    labetalol injection 10 mg Q4H PRN    leucovorin tablet 25 mg Weekly    levetiracetam oral soln Soln 500 mg BID    magnesium sulfate 2g in water 50mL IVPB (premix) PRN    meropenem-0.9% sodium chloride 1 g/50 mL IVPB Q12H    potassium, sodium phosphates 280-160-250 mg packet 2 packet PRN    potassium, sodium phosphates 280-160-250 mg packet 2 packet PRN    potassium, sodium phosphates 280-160-250 mg packet 2 packet PRN    pyrimethamine tablet 50 mg Weekly       Objective:     Vital Signs (Most Recent):  Temp: 99 °F (37.2 °C) (09/29/17 0705)  Pulse: (!) 123 (09/29/17 0909)  Resp: 20 (09/29/17 0909)  BP: (!) 144/81 (09/28/17 1025)  SpO2: 100 % (09/29/17 0909)  O2 Device (Oxygen Therapy): ventilator (09/29/17 0909) Vital Signs (24h Range):  Temp:  [96.1 °F (35.6 °C)-99.9 °F (37.7 °C)] 99 °F (37.2 °C)  Pulse:  [] 123  Resp:  [14-27] 20  SpO2:  [99 %-100 %] 100 %  BP: (144)/(81)  144/81  Arterial Line BP: (116-162)/(62-93) 134/72     Weight: 78.8 kg (173 lb 11.6 oz) (09/27/17 0705)  Body mass index is 23.56 kg/m².  Body surface area is 2 meters squared.    I/O last 3 completed shifts:  In: 3991.7 [I.V.:2295; Blood:626.7; NG/GT:220; IV Piggyback:850]  Out: 4038 [Other:4038]    Physical Exam   Constitutional: He appears well-developed and well-nourished.   HENT:   Head: Normocephalic and atraumatic.   Eyes: Pupils are equal, round, and reactive to light.   Neck: Neck supple.   Cardiovascular: Normal rate, regular rhythm and normal heart sounds.  Exam reveals no friction rub.    No murmur heard.  Pulmonary/Chest: Effort normal and breath sounds normal. He has no wheezes. He has no rales.   intubated   Abdominal: Soft. Bowel sounds are normal. He exhibits no distension. There is no tenderness. There is no guarding.   Musculoskeletal: He exhibits no edema or deformity.   Neurological: He is alert.   Opens eyes to voice. Follows simple commands.   Skin: Skin is dry. No rash noted. No erythema.       Significant Labs:  ABGs:   Recent Labs  Lab 09/29/17  0419   PH 7.435   PCO2 42.1   HCO3 28.3*   POCSATURATED 100   BE 4     CBC:   Recent Labs  Lab 09/29/17  0215   WBC 4.74   RBC 3.05*   HGB 9.1*   HCT 27.4*   PLT 65*   MCV 90   MCH 29.8   MCHC 33.2     CMP:   Recent Labs  Lab 09/25/17  1514  09/29/17  0413   GLU 91  < > 74   CALCIUM 7.8*  < > 7.8*   ALBUMIN 2.0*  < > 1.7*   PROT 7.7  --   --      < > 139   K 4.8  < > 4.4   CO2 23  < > 25   CL 98  < > 106   BUN 53*  < > 6   CREATININE 13.3*  < > 2.2*   ALKPHOS 112  --   --    ALT 49*  --   --    AST 87*  --   --    BILITOT 0.5  --   --    < > = values in this interval not displayed.

## 2017-09-29 NOTE — PROGRESS NOTES
Pt found partially extubated. ET lopes and part of tube found to be sitting on pt's chest. NCC team and respiratory therapist at bedside. Dr. Alonzo MD removed remaining part of ET tube. O2 sat 94%. Secretions suctioned and venti mask applied at 40%.    1016 - Pt O2 sat 99% with venti mask at 40%. Oral care provided. Will continue to monitor.

## 2017-09-29 NOTE — PLAN OF CARE
Problem: Patient Care Overview  Goal: Plan of Care Review  Outcome: Ongoing (interventions implemented as appropriate)  POC reviewed with pt at 1700. Pt verbalized understanding. Questions and concerns addressed. Pt progressing toward goals. Pt maintaining O2 sat % on 4 L NC. Continue EEG in place. Will continue to monitor. See flowsheets for full assessment and VS info.

## 2017-09-29 NOTE — PLAN OF CARE
09/29/17 1526   Discharge Reassessment   Assessment Type Discharge Planning Reassessment   Do you have any problems affording any of your prescribed medications? No   Discharge Plan A Skilled Nursing Facility   Discharge Plan B Rehab   Patient choice form signed by patient/caregiver N/A   Can the patient answer the patient profile reliably? No, cognitively impaired   How does the patient rate their overall health at the present time? (star)   Describe the patient's ability to walk at the present time. Does not walk or unable to take any steps at all   How often would a person be available to care for the patient? Often   Number of comorbid conditions (as recorded on the chart) Two   During the past month, has the patient often been bothered by feeling down, depressed or hopeless? (star)   During the past month, has the patient often been bothered by little interest or pleasure in doing things? (star)       Per MD, patient on intubation watch.  Not medically stable for discharge.     Kelli Goode RN, CCRN-K, [facility]  Neuro-Critical Care   [phone]

## 2017-09-29 NOTE — PLAN OF CARE
Problem: Patient Care Overview  Goal: Plan of Care Review  Outcome: Ongoing (interventions implemented as appropriate)  POC reviewed with pt at 0450. Pt unable to verbalize understanding. No acute events overnight. Pt progressing toward goals. Will continue to monitor. See flowsheets for full assessment and VS info

## 2017-09-29 NOTE — PROCEDURES
DATE OF STUDY:  09/26/2017    ICU EEG/VIDEO MONITORING REPORT     METHODOLOGY:  Electroencephalographic (EEG) is recorded with electrodes placed   according to the International 10-20 placement system.  Thirty two (32) channels   of digital signal (sampling rate of 512/sec), including T1 and T2, were   simultaneously recorded from the scalp and may include EKG, EMG, and/or eye   monitors.  Recording band pass was 0.1 to 512 Hz.  Digital video recording of   the patient is simultaneously recorded with the EEG.  The patient is instructed   to report clinical symptoms which may occur during the recording session.  EEG   and video recording are stored and archived in digital format.  Activation   procedures, which include photic stimulation, hyperventilation and instructing   patients to perform simple tasks, are done in selected patients  The EEG is displayed on a monitor screen and can be reviewed using different   montages.  Computer-assisted analysis is employed to detect spike and   electrographic seizure activity.   The entire record is submitted for computer   analysis.  The entire recording is visually reviewed, and the times identified   by computer analysis as being spikes or seizures are reviewed again.    Compressed spectral analysis (CSA) is also performed on the activity recorded   from each individual channel.  This is displayed as a power display of   frequencies from 0 to 30 Hz over time.   The CSA is reviewed looking for   asymmetries in power between homologous areas of the scalp, then compared with   the original EEG recording.    [employer] software was also utilized in the review of this study.  This software   suite analyzes the EEG recording in multiple domains.  Coherence and rhythmicity   are computed to identify EEG sections which may contain organized seizures.    Each channel undergoes analysis to detect the presence of spike and sharp waves   which have special and morphological  characteristics of epileptic activity.  The   routine EEG recording is converted from special into frequency domain.  This is   then displayed comparing homologous areas to identify areas of significant   asymmetry.  Algorithm to identify non-cortically generated artifact is used to   separate artifact from the EEG.      RECORDING TIMES:  Duration is 16 hours and 22 minutes.  The study begins on   09/25/2017 at 14:19 and ends at 09/26/2017 at 07:00.    EEG FINDINGS:  This study consists of diffusely suppressed cerebral activity   with mostly low amplitude delta activity in the 3 microvolt per mm range of   amplitude and the 1 to 3 Hz frequency band.  There are occasional superimposed   theta frequencies seen in the 4 to 5 Hz range and little variability throughout   this portion of the record.  There were no focal findings.  There are no   seizures.  There are occasionally larger amplitude delta waves with triphasic   components seen, but no rhythmic runs and no definite epileptiform discharges.    There are also no hemispheric findings and no normal waking or sleep   architecture.    INTERPRETATION:  Abnormal EEG due to moderately severe encephalopathy with   diffuse suppression globally of cortical rhythms.  No evidence of epilepsy or   focality.      BREANA/KASSIE  dd: 09/28/2017 10:50:03 (CDT)  td: 09/28/2017 19:04:55 (CDT)  Doc ID   #[id number]  Job ID #[id number]    CC:

## 2017-09-29 NOTE — ASSESSMENT & PLAN NOTE
-Patient is ESRD on HD since May 2017? Underlying etiology uncertain, ?HIV nephropathy, reportedly last received session on Friday.   -MRI concern for cerebritis or other infection. ID following, LP performed.    -SLED overnight, tolerated well, net negative 500 ml/24h.  -electrolytes stable, volume status acceptable.  Minimal vent settings.  -No need for RRT today.  Will continue following and reassess for needs daily.

## 2017-09-29 NOTE — PROCEDURES
DATE OF SERVICE:  09/28/2017  ICU EEG/VIDEO MONITORING REPORT     METHODOLOGY:  Electroencephalographic (EEG) is recorded with electrodes placed   according to the International 10-20 placement system.  Thirty two (32) channels   of digital signal (sampling rate of 512/sec), including T1 and T2, were   simultaneously recorded from the scalp and may include EKG, EMG, and/or eye   monitors.  Recording band pass was 0.1 to 512 Hz.  Digital video recording of   the patient is simultaneously recorded with the EEG.  The patient is instructed   to report clinical symptoms which may occur during the recording session.  EEG   and video recording are stored and archived in digital format.  Activation   procedures, which include photic stimulation, hyperventilation and instructing   patients to perform simple tasks, are done in selected patients  The EEG is displayed on a monitor screen and can be reviewed using different   montages.  Computer-assisted analysis is employed to detect spike and   electrographic seizure activity.   The entire record is submitted for computer   analysis.  The entire recording is visually reviewed, and the times identified   by computer analysis as being spikes or seizures are reviewed again.    Compressed spectral analysis (CSA) is also performed on the activity recorded   from each individual channel.  This is displayed as a power display of   frequencies from 0 to 30 Hz over time.   The CSA is reviewed looking for   asymmetries in power between homologous areas of the scalp, then compared with   the original EEG recording.    [employer] software was also utilized in the review of this study.  This software   suite analyzes the EEG recording in multiple domains.  Coherence and rhythmicity   are computed to identify EEG sections which may contain organized seizures.    Each channel undergoes analysis to detect the presence of spike and sharp waves   which have special and morphological  characteristics of epileptic activity.  The   routine EEG recording is converted from special into frequency domain.  This is   then displayed comparing homologous areas to identify areas of significant   asymmetry.  Algorithm to identify non-cortically generated artifact is used to   separate artifact from the EEG.      RECORDING TIMES:  This is a study of duration of 43 hours and 59 minutes.  The   study begins on 09/26/2017 at 07:00 and ends on 09/28/2017 at 03:12.    FINDINGS:  This record begins as in the prior day with diffuse low amplitude   delta activity seen predominantly.  The patient does appear to be more alert now   at the beginning of this record with more EMG artifact noted in long portions.    Overall, background frequency is still in the delta range and triphasic   waveforms are seen now somewhat more frequently bilaterally synchronously.    These do not appear to be epileptiform and do not form rhythmic or evolving   complexes.  As the study progresses portions are obscured by extensive amount of   EMG and muscle artifact, presumably due to the patient awakening and lighter   degree of sedation.  When this abates, a background consisting of dominant   component of theta activity in the 4 to 6 Hz range can at times be appreciated.    This becomes particularly more prominent in the early morning hours of   09/27/2017, particularly between 05:00 and 08:00 during which time high   amplitude rhythmic runs of triphasic waves can be seen at times in response to   stimulation.  At times, these have a rhythmic are semi-rhythmic appearance, but   are not persistent.    After 09:00 on 09/27/2017, the pattern becomes less sharp and less rhythmic and   more suggestive of a recovering encephalopathy with a mix of theta activity with   faster frequencies and occasional elements of rhythmic delta activity.  The   synchronous triphasic discharges are seen less frequently at this point.  As the   record progresses  over the course of the day on 09/27/2017, more fast activity   is seen, replacing the delta and theta activity predominantly.  Triphasic   activity disappears.  The last several hours of the recording would be   characterized as mild encephalopathy without focality.  The patient's wires were   removed at 03:12 on 09/28/2017.    INTERPRETATION:  Evolving encephalopathy in a 52-year-old hospitalized patient.    The beginning of this portion of the study contains predominantly suppressed   delta activity and over the course of the study, there is an evolution with the   return of theta activity and a few hours of generalized rhythmic GPEDs which   rand on their own after approximately 3 hours.  From that point forward, more   mild encephalopathy is seen with predominantly theta activity giving way to a   mix of alpha and beta activity at the end of this portion.    CLINICAL CORRELATION:  Consistent with a resolving encephalopathy.  No evidence   of focal findings or seizures.      BREANA/KASSIE  dd: 09/28/2017 10:57:06 (CDT)  td: 09/28/2017 19:34:33 (CDT)  Doc ID   #[id number]  Job ID #[id number]    CC:
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
## 2017-09-30 LAB
ALBUMIN SERPL BCP-MCNC: 1.6 G/DL
ALBUMIN SERPL BCP-MCNC: 1.7 G/DL
AMYLASE SERPL-CCNC: 194 U/L
ANION GAP SERPL CALC-SCNC: 6 MMOL/L
ANION GAP SERPL CALC-SCNC: 8 MMOL/L
APTT BLDCRRT: 70.4 SEC
BASOPHILS # BLD AUTO: 0.02 K/UL
BASOPHILS NFR BLD: 0.6 %
BUN SERPL-MCNC: 16 MG/DL
BUN SERPL-MCNC: 8 MG/DL
CALCIUM SERPL-MCNC: 7.6 MG/DL
CALCIUM SERPL-MCNC: 7.8 MG/DL
CHLORIDE SERPL-SCNC: 107 MMOL/L
CHLORIDE SERPL-SCNC: 109 MMOL/L
CO2 SERPL-SCNC: 25 MMOL/L
CO2 SERPL-SCNC: 26 MMOL/L
CREAT SERPL-MCNC: 2.6 MG/DL
CREAT SERPL-MCNC: 4.9 MG/DL
DIFFERENTIAL METHOD: ABNORMAL
EOSINOPHIL # BLD AUTO: 0 K/UL
EOSINOPHIL NFR BLD: 0 %
ERYTHROCYTE [DISTWIDTH] IN BLOOD BY AUTOMATED COUNT: 18.4 %
EST. GFR  (AFRICAN AMERICAN): 14.5 ML/MIN/1.73 M^2
EST. GFR  (AFRICAN AMERICAN): 31.2 ML/MIN/1.73 M^2
EST. GFR  (NON AFRICAN AMERICAN): 12.5 ML/MIN/1.73 M^2
EST. GFR  (NON AFRICAN AMERICAN): 26.9 ML/MIN/1.73 M^2
GLUCOSE SERPL-MCNC: 73 MG/DL
GLUCOSE SERPL-MCNC: 83 MG/DL
HCT VFR BLD AUTO: 25.5 %
HGB BLD-MCNC: 8.4 G/DL
INR PPP: 1
LIPASE SERPL-CCNC: 284 U/L
LYMPHOCYTES # BLD AUTO: 0.4 K/UL
LYMPHOCYTES NFR BLD: 13.6 %
MAGNESIUM SERPL-MCNC: 1.9 MG/DL
MCH RBC QN AUTO: 29.9 PG
MCHC RBC AUTO-ENTMCNC: 32.9 G/DL
MCV RBC AUTO: 91 FL
MONOCYTES # BLD AUTO: 0.5 K/UL
MONOCYTES NFR BLD: 15.2 %
NEUTROPHILS # BLD AUTO: 2.2 K/UL
NEUTROPHILS NFR BLD: 70 %
PHOSPHATE SERPL-MCNC: 1.7 MG/DL
PHOSPHATE SERPL-MCNC: 3.3 MG/DL
PLATELET # BLD AUTO: 61 K/UL
PMV BLD AUTO: 10.3 FL
POCT GLUCOSE: 86 MG/DL (ref 70–110)
POTASSIUM SERPL-SCNC: 4.1 MMOL/L
POTASSIUM SERPL-SCNC: 4.1 MMOL/L
PROTHROMBIN TIME: 10.8 SEC
RBC # BLD AUTO: 2.81 M/UL
SODIUM SERPL-SCNC: 140 MMOL/L
SODIUM SERPL-SCNC: 141 MMOL/L
WBC # BLD AUTO: 3.09 K/UL

## 2017-09-30 PROCEDURE — 97162 PT EVAL MOD COMPLEX 30 MIN: CPT

## 2017-09-30 PROCEDURE — 99233 SBSQ HOSP IP/OBS HIGH 50: CPT | Mod: ,,, | Performed by: INTERNAL MEDICINE

## 2017-09-30 PROCEDURE — 25000003 PHARM REV CODE 250: Performed by: PHYSICIAN ASSISTANT

## 2017-09-30 PROCEDURE — 63600175 PHARM REV CODE 636 W HCPCS: Performed by: INTERNAL MEDICINE

## 2017-09-30 PROCEDURE — 82150 ASSAY OF AMYLASE: CPT

## 2017-09-30 PROCEDURE — 83690 ASSAY OF LIPASE: CPT

## 2017-09-30 PROCEDURE — 80074 ACUTE HEPATITIS PANEL: CPT

## 2017-09-30 PROCEDURE — 85025 COMPLETE CBC W/AUTO DIFF WBC: CPT

## 2017-09-30 PROCEDURE — 94761 N-INVAS EAR/PLS OXIMETRY MLT: CPT

## 2017-09-30 PROCEDURE — 25000003 PHARM REV CODE 250: Performed by: PSYCHIATRY & NEUROLOGY

## 2017-09-30 PROCEDURE — 20000000 HC ICU ROOM

## 2017-09-30 PROCEDURE — 97802 MEDICAL NUTRITION INDIV IN: CPT

## 2017-09-30 PROCEDURE — 83735 ASSAY OF MAGNESIUM: CPT

## 2017-09-30 PROCEDURE — 85730 THROMBOPLASTIN TIME PARTIAL: CPT

## 2017-09-30 PROCEDURE — 95951 PR EEG MONITORING/VIDEORECORD: CPT | Mod: 26,,, | Performed by: PSYCHIATRY & NEUROLOGY

## 2017-09-30 PROCEDURE — 95951 HC EEG MONITORING/VIDEO RECORD: CPT

## 2017-09-30 PROCEDURE — 90945 DIALYSIS ONE EVALUATION: CPT

## 2017-09-30 PROCEDURE — 99233 SBSQ HOSP IP/OBS HIGH 50: CPT | Mod: ,,, | Performed by: ANESTHESIOLOGY

## 2017-09-30 PROCEDURE — 80069 RENAL FUNCTION PANEL: CPT

## 2017-09-30 PROCEDURE — 25000003 PHARM REV CODE 250: Performed by: INTERNAL MEDICINE

## 2017-09-30 PROCEDURE — 85610 PROTHROMBIN TIME: CPT

## 2017-09-30 PROCEDURE — 95957 EEG DIGITAL ANALYSIS: CPT

## 2017-09-30 PROCEDURE — 99233 SBSQ HOSP IP/OBS HIGH 50: CPT | Mod: GC,,, | Performed by: INTERNAL MEDICINE

## 2017-09-30 PROCEDURE — 80069 RENAL FUNCTION PANEL: CPT | Mod: 91

## 2017-09-30 RX ORDER — AZITHROMYCIN 600 MG/1
1200 TABLET, FILM COATED ORAL WEEKLY
Status: DISCONTINUED | OUTPATIENT
Start: 2017-09-30 | End: 2017-10-05 | Stop reason: HOSPADM

## 2017-09-30 RX ORDER — MAGNESIUM SULFATE HEPTAHYDRATE 40 MG/ML
2 INJECTION, SOLUTION INTRAVENOUS
Status: ACTIVE | OUTPATIENT
Start: 2017-09-30 | End: 2017-10-01

## 2017-09-30 RX ORDER — HYDROCODONE BITARTRATE AND ACETAMINOPHEN 500; 5 MG/1; MG/1
TABLET ORAL
Status: ACTIVE | OUTPATIENT
Start: 2017-09-30 | End: 2017-10-01

## 2017-09-30 RX ADMIN — AZITHROMYCIN 1200 MG: 600 TABLET, FILM COATED ORAL at 01:09

## 2017-09-30 RX ADMIN — LEVETIRACETAM 500 MG: 500 SOLUTION ORAL at 08:09

## 2017-09-30 RX ADMIN — CHLORHEXIDINE GLUCONATE 15 ML: 1.2 RINSE ORAL at 08:09

## 2017-09-30 RX ADMIN — ATORVASTATIN CALCIUM 10 MG: 10 TABLET, FILM COATED ORAL at 08:09

## 2017-09-30 RX ADMIN — HYDRALAZINE HYDROCHLORIDE 100 MG: 50 TABLET ORAL at 05:09

## 2017-09-30 RX ADMIN — HYDRALAZINE HYDROCHLORIDE 100 MG: 50 TABLET ORAL at 09:09

## 2017-09-30 RX ADMIN — FAMOTIDINE 20 MG: 20 TABLET, FILM COATED ORAL at 08:09

## 2017-09-30 RX ADMIN — MEROPENEM AND SODIUM CHLORIDE 1 G: 1 INJECTION, SOLUTION INTRAVENOUS at 05:09

## 2017-09-30 RX ADMIN — HYDRALAZINE HYDROCHLORIDE 100 MG: 50 TABLET ORAL at 01:09

## 2017-09-30 RX ADMIN — SODIUM CHLORIDE: 0.9 INJECTION, SOLUTION INTRAVENOUS at 04:09

## 2017-09-30 RX ADMIN — AMLODIPINE BESYLATE 5 MG: 5 TABLET ORAL at 08:09

## 2017-09-30 RX ADMIN — AMPHOTERICIN B 200 MG: 50 INJECTION, POWDER, LYOPHILIZED, FOR SOLUTION INTRAVENOUS at 09:09

## 2017-09-30 NOTE — PT/OT/SLP EVAL
Physical Therapy  Evaluation    Volodymyr Pope   MRN: [id number]   Admitting Diagnosis: Status epilepticus    PT Received On: 09/30/17  PT Start Time: 0751     PT Stop Time: 0812    PT Total Time (min): 21 min       Billable Minutes:  Evaluation 21    Diagnosis: Status epilepticus  History: personal factors and/or comorbidities that impact the plan of care: HIV, AIDs, seizures   Evaluation of Body Systems: cardiovascular/pulmonary, integumentary, musculoskeletal, neuromuscular, cognition/communication  Functional Outcome Tools: AMPAC, ROM, MMT  Clinical Presentation: evolving  Evaluation Complexity Level: Moderate    Past Medical History:   Diagnosis Date    ESRD on hemodialysis     HIV (human immunodeficiency virus infection)     Seizures       Past Surgical History:   Procedure Laterality Date    LUMBAR PUNCTURE  9/26/2017            Referring physician: ALIX Granger  Date referred to PT: 09/29/2017    General Precautions: Standard, fall  Orthopedic Precautions: N/A   Braces: N/A            Patient History:  Living Environment Comment: Pt unable to report history or PLOF and no family present. Per chart review, CM report pt lives with signifcant other and no DME.   DME owned (not currently used): none    Previous Level of Function:    Pt unable to report; no family present    Subjective:  Communicated with RN prior to session.  Pt agreeable to therapy session.      Pain/Comfort  Pain Rating 1: 0/10  Pain Rating Post-Intervention 1: 0/10      Objective:   Patient found with: pulse ox (continuous), telemetry, blood pressure cuff, restraints, arterial line     Cognitive Exam:  Oriented to: Person    Follows Commands/attention: Easily distracted and Follows one-step commands ~25% of session.   Communication: limited verbalizations   Safety awareness/insight to disability: impaired    Physical Exam:  Postural examination/scapula alignment: Rounded shoulder    Skin integrity: Visible skin intact  Edema: None noted B  LE    Sensation:   GWENDOLYN 2* limited command follow     Lower Extremity Range of Motion:  Right Lower Extremity: WFL  Left Lower Extremity: WFL    Lower Extremity Strength:  Right Lower Extremity: GWENDOLYN 2* limited command follow  Left Lower Extremity: GWENDOLYN 2* limited command follow      Functional Mobility:  Bed Mobility:  Supine to Sit: Maximum Assistance  Sit to Supine: Total Assistance, With assist of  2    Transfers:  Sit <> Stand Assistance:  (unable to perform)    Balance:   Static Sit: FAIR: Maintains without assist, but unable to take any challenges   Dynamic Sit: FAIR: Cannot move trunk without losing balance    Therapeutic Activities and Exercises:  Pt educated on role of PT/POC.  Pt sat EOB with CGA for safety.   Pt unable to follow commands to perform sit to stand transfer.     AM-PAC 6 CLICK MOBILITY  How much help from another person does this patient currently need?   1 = Unable, Total/Dependent Assistance  2 = A lot, Maximum/Moderate Assistance  3 = A little, Minimum/Contact Guard/Supervision  4 = None, Modified [place]/Independent    Turning over in bed (including adjusting bedclothes, sheets and blankets)?: 2  Sitting down on and standing up from a chair with arms (e.g., wheelchair, bedside commode, etc.): 1  Moving from lying on back to sitting on the side of the bed?: 2  Moving to and from a bed to a chair (including a wheelchair)?: 1  Need to walk in hospital room?: 1  Climbing 3-5 steps with a railing?: 1  Total Score: 8     AM-PAC Raw Score CMS G-Code Modifier Level of Impairment Assistance   6 % Total / Unable   7 - 9 CM 80 - 100% Maximal Assist   10 - 14 CL 60 - 80% Moderate Assist   15 - 19 CK 40 - 60% Moderate Assist   20 - 22 CJ 20 - 40% Minimal Assist   23 CI 1-20% SBA / CGA   24 CH 0% Independent/ Mod I     Patient left supine with all lines intact, call button in reach and RN notified.    Assessment:   Volodymyr Pope is a 52 y.o. male with a medical diagnosis of Status epilepticus  and presents with decreased cognition and overall functional mobility. Pt performed bed mobility max/total A and sat EOB with CGA for safety. Pt evaluation limited 2* limited command follow. Pt will benefit from skilled PT pending pt ability to participate.     Rehab identified problem list/impairments: Rehab identified problem list/impairments: weakness, gait instability, decreased lower extremity function, impaired balance, impaired endurance, decreased safety awareness, decreased coordination, impaired functional mobilty, impaired cognition    Rehab potential is fair.    Activity tolerance: Fair    Discharge recommendations: Discharge Facility/Level Of Care Needs: nursing facility, skilled (pending pt ability to participate)     Barriers to discharge: Barriers to Discharge: Decreased caregiver support    Equipment recommendations: Equipment Needed After Discharge: other (see comments) (TBD)     GOALS:    Physical Therapy Goals        Problem: Physical Therapy Goal    Goal Priority Disciplines Outcome Goal Variances Interventions   Physical Therapy Goal     PT/OT, PT Ongoing (interventions implemented as appropriate)     Description:  Goals to be met by: 10/10/2017     Patient will increase functional independence with mobility by performin. Supine to sit with MInimal Assistance  2. Sit to supine with MInimal Assistance  3. Sit to stand transfer with Maximum Assistance  4. Bed to chair transfer with Maximum Assistance  5. Gait  x 10 feet with Maximum Assistance using appropriate AD  6. Lower extremity exercise program x15 reps per handout, with assistance as needed                      PLAN:    Patient to be seen 3 x/week to address the above listed problems via gait training, therapeutic activities, therapeutic exercises, neuromuscular re-education, wheelchair management/training  Plan of Care expires: 10/30/17  Plan of Care reviewed with: patient          CONSUELO HOLCOMB, PT  2017

## 2017-09-30 NOTE — PLAN OF CARE
Problem: Nutrition, Imbalanced: Inadequate Oral Intake (Adult)  Goal: Identify Related Risk Factors and Signs and Symptoms  Related risk factors and signs and symptoms are identified upon initiation of Human Response Clinical Practice Guideline (CPG)  Outcome: Ongoing (interventions implemented as appropriate)  Nutrition assessment completed. Please see RD note for details.    Recommendation/Intervention:   1. As medically able, advance diet to Regular with texture per SLP recommendations.   2. If electrolytes elevate, recommend adding Renal restriction.   3. Encourage and promote po intake for optimal nutrition.   Add Novasource Renal OS if intake <50% of meals.     RD to monitor.

## 2017-09-30 NOTE — PT/OT/SLP EVAL
"Speech Language Pathology  Evaluation    Volodymyr Pope   MRN: [id number]   Admitting Diagnosis: Status epilepticus    Diet recommendations: Solid Diet Level: Puree  Liquid Diet Level: Thin Feed only when awake/alert, No straws, HOB to 90 degrees, 1 bite/sip at a time, Meds crushed in puree, Eliminate distractions and Assistance with meals    SLP Treatment Date: 09/30/17  Speech Start Time: 0930     Speech Stop Time: 0945     Speech Total (min): 15 min       TREATMENT BILLABLE MINUTES:  Eval Swallow and Oral Function 15    Diagnosis: Status epilepticus      Past Medical History:   Diagnosis Date    ESRD on hemodialysis     HIV (human immunodeficiency virus infection)     Seizures      Past Surgical History:   Procedure Laterality Date    LUMBAR PUNCTURE  9/26/2017            Has the patient been evaluated by SLP for swallowing? : Yes  Keep patient NPO?: No   General Precautions: Standard, aspiration, seizure, fall        Prior diet: unknown; Pt poor historian     Subjective:  "hold on let me ask you a question"     Pain/Comfort  Pain Rating 1: 0/10  Pain Rating Post-Intervention 1: 0/10    Objective:    Oral Musculature Evaluation  Oral Musculature: general weakness, unable to assess due to poor participation/comprehension  Dentition: scattered dentition  Secretion Management: oral holding (intermittent )  Mucosal Quality: good  Oral Labial Strength and Mobility: WFL (with spontaneous movements)  Volitional Cough: unable to elicit   Volitional Swallow: unable to elicit   Voice Prior to PO Intake: strong and clear      Bedside Swallow Eval:  Consistencies Assessed: Thin liquids 4oz via single sip with assistance , Puree 4oz and Solids x2  Oral Phase: slow oral transit time and other (see comments) Pt with difficulty closing mouth around straw and cup edge,Pt warranting moderate verbal and tactile cueing to achieve. With solid Pt with oral holding and slow mastication time.   Pharyngeal Phase: no overt clinical  " signs/symptoms of aspiration and no overt clinical signs/symptoms of pharyngeal dysphagia; when ultimately accepted Pt with timely swallow with adeaquate hyolaryngeal rise Pt appears safe for thin liqiuds and purees however will benefit from close monitoring and assistance given underlying confusion.     Assessment:  Volodymyr Pope is a 52 y.o. male with a medical diagnosis of Status epilepticus and presents with dysphagia.         Discharge recommendations: Discharge Facility/Level Of Care Needs: nursing facility, skilled     Goals:    SLP Goals        Problem: SLP Goal    Goal Priority Disciplines Outcome   SLP Goal     SLP    Description:  Speech Language Pathology Goals  Goals expected to be met by 10/6     1. Pt will tolerate diet of thin liquids and purees without overt s/s of aspiration   2. Pt will participate in ongoing assessment of swallow function to help determine least restrictive diet   3. PT will participate in cognitive linguistic assessment                      Plan:   Patient to be seen Therapy Frequency: 5 x/week   Plan of Care expires: 10/29/17  Plan of Care reviewed with:    SLP Follow-up?: Yes              Janki Chu CCC-SLP  09/30/2017

## 2017-09-30 NOTE — SUBJECTIVE & OBJECTIVE
Interval History: alert, no new issues    Review of patient's allergies indicates:  No Known Allergies  Current Facility-Administered Medications   Medication Frequency    0.9%  NaCl infusion (for blood administration) Q24H PRN    0.9%  NaCl infusion (for blood administration) Q24H PRN    0.9%  NaCl infusion (for blood administration) Q24H PRN    acetaminophen oral solution 650 mg Q6H PRN    acetaminophen tablet 500 mg Daily PRN    amlodipine tablet 5 mg Daily    amphotericin B liposome (AMBISOME) 200 mg in dextrose 5 % 200 mL IVPB Q24H    atorvastatin tablet 10 mg QHS    chlorhexidine 0.12 % solution 15 mL BID    famotidine tablet 20 mg Daily    hydrALAZINE injection 10 mg Q4H PRN    hydrALAZINE tablet 100 mg Q8H    labetalol injection 10 mg Q4H PRN    levetiracetam oral soln Soln 500 mg BID    magnesium sulfate 2g in water 50mL IVPB (premix) PRN    magnesium sulfate 2g in water 50mL IVPB (premix) PRN    meropenem-0.9% sodium chloride 1 g/50 mL IVPB Q12H    sulfamethoxazole-trimethoprim 800-160mg per tablet 1 tablet Every Mon, Wed, Fri       Objective:     Vital Signs (Most Recent):  Temp: 97.6 °F (36.4 °C) (09/30/17 0700)  Pulse: 105 (09/30/17 1000)  Resp: 14 (09/30/17 1000)  BP: (!) 144/81 (09/28/17 1025)  SpO2: 98 % (09/30/17 1000)  O2 Device (Oxygen Therapy): room air (09/30/17 0705) Vital Signs (24h Range):  Temp:  [97.6 °F (36.4 °C)-99.5 °F (37.5 °C)] 97.6 °F (36.4 °C)  Pulse:  [102-115] 105  Resp:  [0-22] 14  SpO2:  [95 %-100 %] 98 %  Arterial Line BP: (130-169)/(71-91) 152/83     Weight: 78.8 kg (173 lb 11.6 oz) (09/27/17 0705)  Body mass index is 23.56 kg/m².  Body surface area is 2 meters squared.    I/O last 3 completed shifts:  In: 2860 [I.V.:2160; NG/GT:100; IV Piggyback:600]  Out: 3534 [Urine:50; Other:3484]    Physical Exam   Constitutional: He appears well-developed.   HENT:   Head: Normocephalic and atraumatic.   Neck: No JVD present.   Cardiovascular: Normal rate and regular  rhythm.  Exam reveals no friction rub.    Pulmonary/Chest: Effort normal and breath sounds normal.   Abdominal: Soft. He exhibits no distension.   Musculoskeletal: He exhibits edema.   Neurological: He is alert.   Skin: Skin is warm.

## 2017-09-30 NOTE — ASSESSMENT & PLAN NOTE
-Patient is ESRD on HD since May 2017? Underlying etiology uncertain, ?HIV nephropathy, reportedly last received session on Friday.     -stable this morning, labs and Is & Os reviewed, will run SLED with UF of 300-350 overnight

## 2017-09-30 NOTE — PROGRESS NOTES
[facility]  Nephrology  Progress Note    Patient Name: Volodymyr Pope  MRN: [id number]  Admission Date: 9/23/2017  Hospital Length of Stay: 7 days  Attending Provider: Julio Granger MD   Primary Care Physician: Bulmaro Flores  Principal Problem:Status epilepticus    Subjective:     HPI: 51 yearold male with AIDS (CD 49 not on ART or PPx), ESRD on HD (M,W,F), and seizure disorder presents as a transfer from [facility] for Neurology eval and LP.  Patient presented to OSH on 9/15 from a dialysis center for HTN.  He was admitted for HTN urgency and routine workup revealed thrombocytopenia of 19,000.  His hospital course was complicated by AMS with lethargy.  MRI revealed multiple areas concerning for embolic strokes with seziure activity.  Neurology initially felt his AMS and resulting seizure was 2/2 to stroke and started him on Keppra, ASA and stain.  His mental status initially improved however he was found to be HTN with a fever and spent a brief time in the ICU.  Patient was started on ceftriaxone and Vanc given concern for UTI.  After stepping down to the floor patient was once again found to be lethargic and confused.  Neurology wished to pursue LP but due to platelet count of 52,000 they felt uncomfortable due to lack of Neurosurgery and this is the reason for transfer to [facility].  Other PMH significant for poorly controlled HTN and HIV/?AIDS with uncertain compliance.  Patient reportedly initially was Aox4, but when I see him he is not alert, not answering questions, arousable, but barely.     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    Prior to hospital admit in [place] he was receiving HD at [facility] in [place] on MWF through a R sided tunneled access line (reportedly w/o access problems and access placed in May), his nephrologist in [place] is Dr Hammond, treatment duration 4hrs, EDW: 78 kg, Normal UF 1-2 L, he does have residual renal function, uncertain how much, although noted he has made about 600ccs of urine overnight.    He reportedly received dialysis this past Friday, his lytes, acid/base and volume status are stable this morning, he is actually net negative 400ccs overnight, and appears to be below his EDW.      Interval History: alert, no new issues    Review of patient's allergies indicates:  No Known Allergies  Current Facility-Administered Medications   Medication Frequency    0.9%  NaCl infusion (for blood administration) Q24H PRN    0.9%  NaCl infusion (for blood administration) Q24H PRN    0.9%  NaCl infusion (for blood administration) Q24H PRN    acetaminophen oral solution 650 mg Q6H PRN    acetaminophen tablet 500 mg Daily PRN    amlodipine tablet 5 mg Daily    amphotericin B liposome (AMBISOME) 200 mg in dextrose 5 % 200 mL IVPB Q24H    atorvastatin tablet 10 mg QHS    chlorhexidine 0.12 % solution 15 mL BID    famotidine tablet 20 mg Daily    hydrALAZINE injection 10 mg Q4H PRN    hydrALAZINE tablet 100 mg Q8H    labetalol injection 10 mg Q4H PRN    levetiracetam oral soln Soln 500 mg BID    magnesium sulfate 2g in water 50mL IVPB (premix) PRN    magnesium sulfate 2g in water 50mL IVPB (premix) PRN    meropenem-0.9% sodium chloride 1 g/50 mL IVPB Q12H    sulfamethoxazole-trimethoprim 800-160mg per tablet 1 tablet Every Mon, Wed, Fri       Objective:     Vital Signs (Most Recent):  Temp: 97.6 °F (36.4 °C) (09/30/17 0700)  Pulse: 105 (09/30/17 1000)  Resp: 14 (09/30/17 1000)  BP: (!) 144/81 (09/28/17 1025)  SpO2: 98 % (09/30/17 1000)  O2 Device (Oxygen Therapy): room air (09/30/17 0705) Vital Signs  (24h Range):  Temp:  [97.6 °F (36.4 °C)-99.5 °F (37.5 °C)] 97.6 °F (36.4 °C)  Pulse:  [102-115] 105  Resp:  [0-22] 14  SpO2:  [95 %-100 %] 98 %  Arterial Line BP: (130-169)/(71-91) 152/83     Weight: 78.8 kg (173 lb 11.6 oz) (09/27/17 0705)  Body mass index is 23.56 kg/m².  Body surface area is 2 meters squared.    I/O last 3 completed shifts:  In: 2860 [I.V.:2160; NG/GT:100; IV Piggyback:600]  Out: 3534 [Urine:50; Other:3484]    Physical Exam   Constitutional: He appears well-developed.   HENT:   Head: Normocephalic and atraumatic.   Neck: No JVD present.   Cardiovascular: Normal rate and regular rhythm.  Exam reveals no friction rub.    Pulmonary/Chest: Effort normal and breath sounds normal.   Abdominal: Soft. He exhibits no distension.   Musculoskeletal: He exhibits edema.   Neurological: He is alert.   Skin: Skin is warm.         Assessment/Plan:     ESRD (end stage renal disease)    -Patient is ESRD on HD since May 2017? Underlying etiology uncertain, ?HIV nephropathy, reportedly last received session on Friday.     -stable this morning, labs and Is & Os reviewed, will run SLED with UF of 300-350 overnight              Thank you for your consult.    Félix Gomez MD  Nephrology  [facility]

## 2017-09-30 NOTE — PLAN OF CARE
Problem: Patient Care Overview  Goal: Plan of Care Review  POC reviewed with pt at 0500. Pt verbalized understanding. Questions and concerns addressed. No acute events overnight. Pt remains confused.  Pt progressing toward goals. Will continue to monitor. See flowsheets for full assessment and VS info

## 2017-09-30 NOTE — PLAN OF CARE
Problem: Physical Therapy Goal  Goal: Physical Therapy Goal  Goals to be met by: 10/10/2017     Patient will increase functional independence with mobility by performin. Supine to sit with MInimal Assistance  2. Sit to supine with MInimal Assistance  3. Sit to stand transfer with Maximum Assistance  4. Bed to chair transfer with Maximum Assistance  5. Gait  x 10 feet with Maximum Assistance using appropriate AD  6. Lower extremity exercise program x15 reps per handout, with assistance as needed    Outcome: Ongoing (interventions implemented as appropriate)  Pt evaluation complete.     CONSUELO HOLCOMB, PT  2017

## 2017-09-30 NOTE — ASSESSMENT & PLAN NOTE
Case of 51 y/o male with PMHX of HIV currently AIDS not treated at this time with no follow up in out patient to our knowledge. Patient CD4 count from OSH 49. CD4 count on 9/23 18.4. Which requires PCP and Toxoplasma and MAC prophylaxis.     - on Bactrim DS MWF PO to cover PCP and Toxoplasma prophylaxis.   - recommend discontinuing meropenam  - HAART not indicated until we determine status of current lethargic state and rule out infectious causes. Begin HAART can cause an aggressive immune response also known as IRIS. It can also worcen HIV encephalopathy and PML that is possible etiology as suggested by MRI results.   - recommend Azythromycin for MAC prophylaxis   - Previously recommended Hepatitis Panel. Urinary histoplasmosis ag pending.  CT chest/abdo reveals many prominent and mildly enlarged periaortic lymph nodes.    - EBV PCR negative; CMV PCR negative; JCV IgG pending.  FTA reactive minimal.  - on amphotericin, continue at least until histo antigen results.

## 2017-09-30 NOTE — PLAN OF CARE
Problem: SLP Goal  Goal: SLP Goal  Pt seen for clinical swallow assessment. Confusion at baseline yet otherwise appearing safe for thin liquids and purees with assistance.     Janki Chu MS, CCC-SLP  Speech Language Pathologist  Pager: [phone]  Date 9/30/2017

## 2017-09-30 NOTE — ASSESSMENT & PLAN NOTE
Nutrition Problem:  Inadequate energy intake    Related to (etiology):   Inability to consume sufficient energy    Signs and Symptoms (as evidenced by):   NPO, no diet advancement at this time.     Interventions/Recommendations (treatment strategy):  Please see RD recs above.    Nutrition Diagnosis Status:   New

## 2017-09-30 NOTE — PROGRESS NOTES
[facility]  Adult Nutrition  Consult Note    SUMMARY     Recommendations    Recommendation/Intervention:   1. As medically able, advance diet to Regular with texture per SLP recommendations.   2. If electrolytes elevate, recommend adding Renal restriction.   3. Encourage and promote po intake for optimal nutrition.   Add Novasource Renal OS if intake <50% of meals.     RD to monitor.      Goals: Pt to receive and tolerate >50% of meals by RD follow up  Nutrition Goal Status: new  Communication of RD Recs: reviewed with RN    Reason for Assessment    Reason for Assessment: NPO/clear liquids x 5 days  Diagnosis: seizures  Relevent Medical History: HIV/AIDS, ESRD, HTN, seizures         General Information Comments: Pt extubated yesterday. Tolerating RA at this time. Remains NPO however per nsg, pt with puree and thin liquids recs from SLP.    Nutrition Discharge Planning: adequate po intake for optimal nutrition    Nutrition Prescription Ordered    Current Diet Order: NPO     Evaluation of Received Nutrients/Fluid Intake        I/O: +I/O, low UOP, LBM 9/25     % Intake of Estimated Energy Needs: 0 - 25 %  % Meal Intake: NPO     Nutrition Risk Screen     Nutrition Risk Screen: no indicators present    Nutrition/Diet History       Typical Food/Fluid Intake: GWENDOLYN. Pt remains NPO.  Food Preferences: GWENDOLYN Zoroastrian/cultural preferences at this time.        Factors Affecting Nutritional Intake: NPO                Labs/Tests/Procedures/Meds       Pertinent Labs Reviewed: reviewed  Pertinent Labs Comments: BUN 16, Cr 4.9  Pertinent Medications Reviewed: reviewed  Pertinent Medications Comments: statin    Physical Findings    Overall Physical Appearance: lethargic, listlessness, nourished        Skin: other (see comments) (ulceration)    Anthropometrics    Temp: 97.8 °F (36.6 °C)     Height: 6' (182.9 cm)  Weight Method: Bed Scale  Weight: 78.8 kg (173 lb 11.6 oz)  Ideal Body Weight (IBW), Male: 178 lb     %  Ideal Body Weight, Male (lb): 87.42 lb     BMI (Calculated): 21.1  BMI Grade: 18.5-24.9 - normal                            Estimated/Assessed Needs    Weight Used For Calorie Calculations: 78.8 kg (173 lb 11.6 oz)      Energy Calorie Requirements (kcal): 2088  Energy Need Method: Grandin-St Jeor (PAL 1.25)        RMR (Grandin-St. Jeor Equation): 1671        Weight Used For Protein Calculations: 78.8 kg (173 lb 11.6 oz)  Protein Requirements: 79-95g (1.0-1.2g/kg)    Fluid Requirements (mL): 1mL/kcal or per MD  RDA Method (mL): 2088               Assessment and Plan    * Status epilepticus    Nutrition Problem:  Inadequate energy intake    Related to (etiology):   Inability to consume sufficient energy    Signs and Symptoms (as evidenced by):   NPO, no diet advancement at this time.     Interventions/Recommendations (treatment strategy):  Please see RD recs above.    Nutrition Diagnosis Status:   New              Monitor and Evaluation    Food and Nutrient Intake: energy intake, food and beverage intake  Food and Nutrient Adminstration: diet order        Anthropometric Measurements: weight, weight change, body mass index  Biochemical Data, Medical Tests and Procedures: electrolyte and renal panel, gastrointestinal profile, glucose/endocrine profile, inflammatory profile, lipid profile  Nutrition-Focused Physical Findings: overall appearance    Nutrition Risk    Level of Risk: other (see comments) (f/u 2x/week)    Nutrition Follow-Up    RD Follow-up?: Yes

## 2017-09-30 NOTE — PROGRESS NOTES
ICU Progress Note  Neurocritical Care    Admit Date: 9/23/2017  LOS: 7    CC: Status epilepticus    Code Status: Full Code     SUBJECTIVE:     Interval History/Significant Events:   Agitated at times  leucopenia  Afebrile  Anemia  ESRD  Start po  Follow ary/lipase  .     Medications:  Continuous Infusions:   Scheduled Meds:   amlodipine  5 mg Per NG tube Daily    amphotericin B liposome (AMBISOME) IVPB  3 mg/kg Intravenous Q24H    atorvastatin  10 mg Per NG tube QHS    chlorhexidine  15 mL Mouth/Throat BID    famotidine  20 mg Per NG tube Daily    hydrALAZINE  100 mg Per NG tube Q8H    levetiracetam oral soln  500 mg Per NG tube BID    sulfamethoxazole-trimethoprim 800-160mg  1 tablet Oral Every Mon, Wed, Fri     PRN Meds:.sodium chloride, sodium chloride, sodium chloride, acetaminophen, acetaminophen, hydrALAZINE, labetalol, magnesium sulfate IVPB, magnesium sulfate IVPB    OBJECTIVE:   Vital Signs (Most Recent):   Temp: 97.8 °F (36.6 °C) (09/30/17 1100)  Pulse: 102 (09/30/17 1100)  Resp: 18 (09/30/17 1100)  BP: (!) 144/81 (09/28/17 1025)  SpO2: 98 % (09/30/17 1100)    Vital Signs (24h Range):   Temp:  [97.6 °F (36.4 °C)-98.6 °F (37 °C)] 97.8 °F (36.6 °C)  Pulse:  [102-115] 102  Resp:  [0-22] 18  SpO2:  [95 %-100 %] 98 %  Arterial Line BP: (130-169)/(71-91) 157/81    ICP/CPP (Last 24h):        I & O (Last 24h):   Intake/Output Summary (Last 24 hours) at 09/30/17 1218  Last data filed at 09/30/17 1100   Gross per 24 hour   Intake              385 ml   Output               50 ml   Net              335 ml     Physical Exam:  GA: Alert, comfortable, no acute distress.   HEENT: No scleral icterus or JVD.   Pulmonary: Clear to auscultation A/P/L. No wheezing, crackles, or rhonchi.  Cardiac: RRR S1 & S2 w/o rubs/murmurs/gallops.   Abdominal: Bowel sounds present x 4. No appreciable hepatosplenomegaly.  Skin: No jaundice, rashes, or visible lesions.  Neuro:  Awake  Talking  ox2  No change in exam    Vent Data:    Oxygen Concentration (%):  [40] 40    Lines/Drains/Airway:     RRT cath       Tunneled Central Line Insertion/Assessment - Double Lumen  09/23/17 1100 right internal jugular (Active)   Dressing biopatch in place;dressing dry and intact;dressing changed 9/30/2017 11:00 AM   IV Device Securement sutures 9/30/2017 11:00 AM   Additional Site Signs no erythema;no warmth;no edema;no pain;no palpable cord;no streak formation;no drainage 9/30/2017 11:00 AM   Distal Patency/Care blood return present;flushed w/o difficulty;normal saline locked 9/30/2017 11:00 AM   Proximal Patency/Care flushed w/o difficulty;blood return present;normal saline locked 9/30/2017 11:00 AM   Dressing Change Due 10/07/17 9/30/2017 11:00 AM   Daily Line Review Performed 9/30/2017 11:00 AM           Arterial Line 09/27/17 1808 Left Radial (Active)   Site Assessment Clean;Dry;Intact;No redness;No swelling 9/30/2017 11:00 AM   Line Status Pulsatile blood flow 9/30/2017 11:00 AM   Art Line Waveform Appropriate;Square wave test performed 9/30/2017 11:00 AM   Arterial Line Interventions Zeroed and calibrated;Leveled 9/30/2017 11:00 AM   Color/Movement/Sensation Capillary refill less than 3 sec 9/30/2017 11:00 AM   Dressing Type Transparent 9/30/2017 11:00 AM   Dressing Status Biopatch in place;Clean;Dry;Intact 9/30/2017 11:00 AM   Dressing Intervention Dressing reinforced 9/30/2017 11:00 AM   Dressing Change Due 10/04/17 9/30/2017 11:00 AM       Nutrition/Tube Feeds (if NPO state why): start po    Labs:  ABG:   Recent Labs  Lab 09/29/17  1746   PH 7.434   PO2 172*   PCO2 38.7   HCO3 25.9   POCSATURATED 100   BE 2     BMP:  Recent Labs  Lab 09/30/17  0209      K 4.1      CO2 25   BUN 16   CREATININE 4.9*   GLU 73   PHOS 3.3     LFT: Lab Results   Component Value Date    AST 87 (H) 09/25/2017    ALT 49 (H) 09/25/2017    ALKPHOS 112 09/25/2017    BILITOT 0.5 09/25/2017    ALBUMIN 1.6 (L) 09/30/2017    PROT 7.7 09/25/2017     CBC:   Lab Results    Component Value Date    WBC 3.09 (L) 09/30/2017    HGB 8.4 (L) 09/30/2017    HCT 25.5 (L) 09/30/2017    MCV 91 09/30/2017    PLT 61 (L) 09/30/2017     Microbiology x 7d:   Microbiology Results (last 7 days)     Procedure Component Value Units Date/Time    Blood culture [[id number]] Collected:  09/27/17 0617    Order Status:  Completed Specimen:  Blood from Peripheral, Antecubital, Right Updated:  09/30/17 1012     Blood Culture, Routine No Growth to date     Blood Culture, Routine No Growth to date     Blood Culture, Routine No Growth to date     Blood Culture, Routine No Growth to date    Narrative:       Blood cultures from 2 different sites. 4 bottles total.  Please draw before starting antibiotics.    Blood culture [[id number]] Collected:  09/27/17 0625    Order Status:  Completed Specimen:  Blood from Peripheral, Foot, Left Updated:  09/30/17 1012     Blood Culture, Routine No Growth to date     Blood Culture, Routine No Growth to date     Blood Culture, Routine No Growth to date     Blood Culture, Routine No Growth to date    Narrative:       Blood cultures x 2 different sites. 4 bottles total. Please  draw cultures before administering antibiotics.    CSF culture [[id number]] Collected:  09/26/17 1442    Order Status:  Completed Specimen:  CSF (Spinal Fluid) from CSF Tap, Tube 3 Updated:  09/30/17 0720     CSF CULTURE No Growth to date     Gram Stain Result Cytospin indicates:      No WBC's      No organisms seen    Blood culture [[id number]] Collected:  09/28/17 1030    Order Status:  Completed Specimen:  Blood from Line, Subclavian, Right Updated:  09/29/17 1412     Blood Culture, Routine No Growth to date     Blood Culture, Routine No Growth to date    Narrative:       Please draw from R tunneled catheter    Culture, Respiratory with Gram Stain [[id number]] Collected:  09/27/17 0709    Order Status:  Completed Specimen:  Respiratory from Endotracheal Aspirate Updated:  09/29/17 1110     Respiratory Culture  Normal respiratory brittany     Gram Stain (Respiratory) <10 epithelial cells per low power field.     Gram Stain (Respiratory) Many WBC's     Gram Stain (Respiratory) No organisms seen    Narrative:       Mini-BAL.    Blood culture [[id number]] Collected:  09/24/17 0013    Order Status:  Completed Specimen:  Blood Updated:  09/29/17 0612     Blood Culture, Routine No growth after 5 days.    Narrative:       OBTAIN from Central line please    Blood culture [[id number]] Collected:  09/23/17 2122    Order Status:  Completed Specimen:  Blood Updated:  09/28/17 2312     Blood Culture, Routine No growth after 5 days.    Narrative:       Collection has been rescheduled by RNS at 9/23/2017 20:40 Reason:    came in.  Collection has been rescheduled by RNS at 9/23/2017 20:48 Reason: dr.   still with pt.  Collection has been rescheduled by RNS at 9/23/2017 20:40 Reason:    came in.  Collection has been rescheduled by RNS at 9/23/2017 20:48 Reason: dr.   still with pt.    Blood culture [[id number]] Collected:  09/23/17 2039    Order Status:  Completed Specimen:  Blood Updated:  09/28/17 2312     Blood Culture, Routine No growth after 5 days.    Blood culture [[id number]]     Order Status:  Canceled Specimen:  Blood     Blood culture [[id number]]     Order Status:  Canceled Specimen:  Blood     Culture, Respiratory with Gram Stain [[id number]]     Order Status:  Canceled Specimen:  Respiratory     AFB Culture & Smear [[id number]] Collected:  09/26/17 1442    Order Status:  Completed Specimen:  CSF (Spinal Fluid) from CSF Tap, Tube 3 Updated:  09/27/17 2127     AFB Culture & Smear Culture in progress     AFB CULTURE STAIN No acid fast bacilli seen.    Cryptococcal antigen, CSF [[id number]] Collected:  09/26/17 1442    Order Status:  Completed Specimen:  CSF (Spinal Fluid) from CSF Tap, Tube 3 Updated:  09/27/17 1009     Crypto Ag, CSF Negative    AFB Culture & Smear [[id number]] Collected:  09/26/17 0611    Order Status:  Completed  Specimen:  Blood from Blood Updated:  09/27/17 0927     AFB Culture & Smear Culture in progress    Gram stain [[id number]] Collected:  09/26/17 1442    Order Status:  Canceled Specimen:  CSF (Spinal Fluid) from CSF Tap, Tube 3 Updated:  09/26/17 1616    Fungus Culture, Blood or Bone Marrow [[id number]] Collected:  09/26/17 0611    Order Status:  No result Specimen:  Blood from Arm, Left Updated:  09/26/17 0753    Fungus culture [[id number]] Collected:  09/26/17 0611    Order Status:  Canceled Specimen:  Blood from Arm, Left Updated:  09/26/17 0627    Urine culture [[id number]]     Order Status:  Canceled Specimen:  Urine     Cryptococcal antigen [[id number]] Collected:  09/24/17 0351    Order Status:  Completed Specimen:  Blood Updated:  09/24/17 0833     Cryptococcal Ag, Blood Negative    Narrative:       Add on Cryptococcal antigen per Dr. Enma Song, order #[id number]   07:35  09/24/2017     Cryptococcal antigen [[id number]]     Order Status:  Completed Specimen:  Blood from Blood     AFB Culture & Smear [[id number]]     Order Status:  Sent Specimen:  Blood         Imaging:  Check cxr    I personally reviewed the above image.    ASSESSMENT/PLAN:     Active Hospital Problems    Diagnosis    *Status epilepticus    Chronic pancreatitis    Acute respiratory failure with hypoxia and hypercapnia    Garibaldi coma scale total score 3-8    Alteration in skin integrity    Acute encephalopathy    AIDS    Essential hypertension    ESRD (end stage renal disease)    Thrombocytopenia      Plan:  Follow ID recs  Follow exam  Check cxr  Start po  RRT this evening  Hepatitis panel sent      Level;3

## 2017-09-30 NOTE — SUBJECTIVE & OBJECTIVE
Past Medical History:   Diagnosis Date    ESRD on hemodialysis     HIV (human immunodeficiency virus infection)     Seizures        Past Surgical History:   Procedure Laterality Date    LUMBAR PUNCTURE  9/26/2017            Review of patient's allergies indicates:  No Known Allergies    Medications:  Prescriptions Prior to Admission   Medication Sig    cloNIDine (CATAPRES) 0.1 MG tablet Take 0.1 mg by mouth 2 (two) times daily.    hydrALAZINE (APRESOLINE) 50 MG tablet Take 50 mg by mouth every 6 (six) hours.     lamivudine-zidovudine 150-300mg (COMBIVIR) 150-300 mg Tab Take 1 tablet by mouth every 12 (twelve) hours.    levetiracetam (KEPPRA) 1000 MG tablet Take 500 mg by mouth every Mon, Wed, Fri. Take after dialysis.     Antibiotics     Start     Stop Route Frequency Ordered    09/29/17 1745  sulfamethoxazole-trimethoprim 800-160mg per tablet 1 tablet      -- Oral Every Mon, Wed, Fri 09/29/17 1634    09/26/17 0600  meropenem-0.9% sodium chloride 1 g/50 mL IVPB      -- IV Every 12 hours (non-standard times) 09/26/17 0449        Antifungals     Start     Stop Route Frequency Ordered    09/27/17 0830  amphotericin B liposome (AMBISOME) 200 mg in dextrose 5 % 200 mL IVPB  (amphotericin B liposome (AMBISOME) IVPB panel)      -- IV Every 24 hours (non-standard times) 09/27/17 0648        Antivirals     None             There is no immunization history on file for this patient.    Family History     Family history is unknown by patient.        Social History     Social History    Marital status:      Spouse name: N/A    Number of children: N/A    Years of education: N/A     Social History Main Topics    Smoking status: Never Smoker    Smokeless tobacco: Never Used    Alcohol use No    Drug use: No    Sexual activity: Yes     Partners: Female     Other Topics Concern    None     Social History Narrative    None     Review of Systems   Unable to perform ROS: Mental status change     Objective:      Vital Signs (Most Recent):  Temp: 97.6 °F (36.4 °C) (09/30/17 0700)  Pulse: 105 (09/30/17 1000)  Resp: 14 (09/30/17 1000)  BP: (!) 144/81 (09/28/17 1025)  SpO2: 98 % (09/30/17 1000) Vital Signs (24h Range):  Temp:  [97.6 °F (36.4 °C)-99.5 °F (37.5 °C)] 97.6 °F (36.4 °C)  Pulse:  [102-115] 105  Resp:  [0-22] 14  SpO2:  [95 %-100 %] 98 %  Arterial Line BP: (130-169)/(71-91) 152/83     Weight: 78.8 kg (173 lb 11.6 oz)  Body mass index is 23.56 kg/m².    Estimated Creatinine Clearance: 19.1 mL/min (based on SCr of 4.9 mg/dL (H)).    Physical Exam   Constitutional: He appears well-developed and well-nourished.   Eyes: Pupils are equal, round, and reactive to light.   Neck: Neck supple.   Cardiovascular: Regular rhythm and normal heart sounds.  Exam reveals no friction rub.    No murmur heard.  tachycardic   Pulmonary/Chest: Effort normal and breath sounds normal. He has no wheezes. He has no rales.   Abdominal: Soft. Bowel sounds are normal. He exhibits no distension. There is no tenderness. There is no guarding.   Musculoskeletal: He exhibits no edema or deformity.   Neurological: He is alert.   More awake, can speak sentences, but cannot complete thoughts.   Skin: Skin is dry. No rash noted. No erythema.       Significant Labs:   Blood Culture:     Recent Labs  Lab 09/23/17  2122 09/24/17  0013 09/27/17  0617 09/27/17  0625 09/28/17  1030   LABBLOO No growth after 5 days. No growth after 5 days. No Growth to date  No Growth to date  No Growth to date  No Growth to date No Growth to date  No Growth to date  No Growth to date  No Growth to date No Growth to date  No Growth to date     BMP:     Recent Labs  Lab 09/29/17  0914 09/30/17  0209   GLU  --  73   NA  --  140   K  --  4.1   CL  --  107   CO2  --  25   BUN  --  16   CREATININE  --  4.9*   CALCIUM  --  7.6*   MG 1.7  --      C4 Count: No results for input(s): C4 in the last 48 hours.  CBC:     Recent Labs  Lab 09/29/17  0215 09/30/17  0209   WBC 4.74  3.09*   HGB 9.1* 8.4*   HCT 27.4* 25.5*   PLT 65* 61*       Significant Imaging: I have reviewed all pertinent imaging results/findings within the past 24 hours.

## 2017-10-01 LAB
ALBUMIN SERPL BCP-MCNC: 1.7 G/DL
ALBUMIN SERPL BCP-MCNC: 1.8 G/DL
ALLENS TEST: ABNORMAL
AMYLASE SERPL-CCNC: 195 U/L
ANION GAP SERPL CALC-SCNC: 6 MMOL/L
ANION GAP SERPL CALC-SCNC: 7 MMOL/L
ANISOCYTOSIS BLD QL SMEAR: SLIGHT
APTT BLDCRRT: 28.5 SEC
BASOPHILS # BLD AUTO: 0.01 K/UL
BASOPHILS NFR BLD: 0.3 %
BUN SERPL-MCNC: 11 MG/DL
BUN SERPL-MCNC: 15 MG/DL
BUN SERPL-MCNC: 5 MG/DL
BUN SERPL-MCNC: 5 MG/DL
CALCIUM SERPL-MCNC: 7.8 MG/DL
CALCIUM SERPL-MCNC: 8 MG/DL
CALCIUM SERPL-MCNC: 8 MG/DL
CALCIUM SERPL-MCNC: 8.1 MG/DL
CHLORIDE SERPL-SCNC: 109 MMOL/L
CHLORIDE SERPL-SCNC: 110 MMOL/L
CMV SPEC QL SHELL VIAL CULT: NO GROWTH
CO2 SERPL-SCNC: 24 MMOL/L
CO2 SERPL-SCNC: 25 MMOL/L
CO2 SERPL-SCNC: 26 MMOL/L
CO2 SERPL-SCNC: 26 MMOL/L
CREAT SERPL-MCNC: 1.9 MG/DL
CREAT SERPL-MCNC: 1.9 MG/DL
CREAT SERPL-MCNC: 3.8 MG/DL
CREAT SERPL-MCNC: 4.5 MG/DL
DELSYS: ABNORMAL
DIFFERENTIAL METHOD: ABNORMAL
EOSINOPHIL # BLD AUTO: 0 K/UL
EOSINOPHIL NFR BLD: 0 %
ERYTHROCYTE [DISTWIDTH] IN BLOOD BY AUTOMATED COUNT: 17.9 %
EST. GFR  (AFRICAN AMERICAN): 16 ML/MIN/1.73 M^2
EST. GFR  (AFRICAN AMERICAN): 19.7 ML/MIN/1.73 M^2
EST. GFR  (AFRICAN AMERICAN): 45.5 ML/MIN/1.73 M^2
EST. GFR  (AFRICAN AMERICAN): 45.5 ML/MIN/1.73 M^2
EST. GFR  (NON AFRICAN AMERICAN): 13.9 ML/MIN/1.73 M^2
EST. GFR  (NON AFRICAN AMERICAN): 17 ML/MIN/1.73 M^2
EST. GFR  (NON AFRICAN AMERICAN): 39.4 ML/MIN/1.73 M^2
EST. GFR  (NON AFRICAN AMERICAN): 39.4 ML/MIN/1.73 M^2
FLOW: 15
GLUCOSE SERPL-MCNC: 102 MG/DL
GLUCOSE SERPL-MCNC: 80 MG/DL
GRAM STN SPEC: NORMAL
HCO3 UR-SCNC: 26.7 MMOL/L (ref 24–28)
HCT VFR BLD AUTO: 27.4 %
HGB BLD-MCNC: 9.1 G/DL
HYPOCHROMIA BLD QL SMEAR: ABNORMAL
INR PPP: 1
LIPASE SERPL-CCNC: 254 U/L
LYMPHOCYTES # BLD AUTO: 0.3 K/UL
LYMPHOCYTES NFR BLD: 9.3 %
MAGNESIUM SERPL-MCNC: 1.7 MG/DL
MAGNESIUM SERPL-MCNC: 1.7 MG/DL
MAGNESIUM SERPL-MCNC: 1.8 MG/DL
MCH RBC QN AUTO: 29.4 PG
MCHC RBC AUTO-ENTMCNC: 33.2 G/DL
MCV RBC AUTO: 89 FL
MODE: ABNORMAL
MONOCYTES # BLD AUTO: 0.5 K/UL
MONOCYTES NFR BLD: 18 %
NEUTROPHILS # BLD AUTO: 2.1 K/UL
NEUTROPHILS NFR BLD: 72.4 %
PCO2 BLDA: 37.5 MMHG (ref 35–45)
PH SMN: 7.46 [PH] (ref 7.35–7.45)
PHOSPHATE SERPL-MCNC: 1.5 MG/DL
PHOSPHATE SERPL-MCNC: 1.5 MG/DL
PHOSPHATE SERPL-MCNC: 3 MG/DL
PHOSPHATE SERPL-MCNC: 3.2 MG/DL
PLATELET # BLD AUTO: 62 K/UL
PLATELET BLD QL SMEAR: ABNORMAL
PMV BLD AUTO: 10.1 FL
PO2 BLDA: 390 MMHG (ref 80–100)
POC BE: 3 MMOL/L
POC SATURATED O2: 100 % (ref 95–100)
POC TCO2: 28 MMOL/L (ref 23–27)
POIKILOCYTOSIS BLD QL SMEAR: ABNORMAL
POLYCHROMASIA BLD QL SMEAR: ABNORMAL
POTASSIUM SERPL-SCNC: 4.3 MMOL/L
POTASSIUM SERPL-SCNC: 4.3 MMOL/L
POTASSIUM SERPL-SCNC: 4.6 MMOL/L
POTASSIUM SERPL-SCNC: 4.6 MMOL/L
PROTHROMBIN TIME: 10.9 SEC
RBC # BLD AUTO: 3.09 M/UL
SAMPLE: ABNORMAL
SITE: ABNORMAL
SODIUM SERPL-SCNC: 140 MMOL/L
SODIUM SERPL-SCNC: 141 MMOL/L
WBC # BLD AUTO: 3 K/UL

## 2017-10-01 PROCEDURE — 25000003 PHARM REV CODE 250: Performed by: PSYCHIATRY & NEUROLOGY

## 2017-10-01 PROCEDURE — 20000000 HC ICU ROOM

## 2017-10-01 PROCEDURE — 95951 HC EEG MONITORING/VIDEO RECORD: CPT

## 2017-10-01 PROCEDURE — 85025 COMPLETE CBC W/AUTO DIFF WBC: CPT

## 2017-10-01 PROCEDURE — 25000003 PHARM REV CODE 250: Performed by: PHYSICIAN ASSISTANT

## 2017-10-01 PROCEDURE — 83690 ASSAY OF LIPASE: CPT

## 2017-10-01 PROCEDURE — 80069 RENAL FUNCTION PANEL: CPT

## 2017-10-01 PROCEDURE — 97530 THERAPEUTIC ACTIVITIES: CPT

## 2017-10-01 PROCEDURE — 37799 UNLISTED PX VASCULAR SURGERY: CPT

## 2017-10-01 PROCEDURE — 95951 PR EEG MONITORING/VIDEORECORD: CPT | Mod: 26,,, | Performed by: PSYCHIATRY & NEUROLOGY

## 2017-10-01 PROCEDURE — 63600175 PHARM REV CODE 636 W HCPCS: Performed by: PHYSICIAN ASSISTANT

## 2017-10-01 PROCEDURE — 97166 OT EVAL MOD COMPLEX 45 MIN: CPT

## 2017-10-01 PROCEDURE — 99232 SBSQ HOSP IP/OBS MODERATE 35: CPT | Mod: ,,, | Performed by: INTERNAL MEDICINE

## 2017-10-01 PROCEDURE — 63600175 PHARM REV CODE 636 W HCPCS: Performed by: INTERNAL MEDICINE

## 2017-10-01 PROCEDURE — 99291 CRITICAL CARE FIRST HOUR: CPT | Mod: ,,, | Performed by: ANESTHESIOLOGY

## 2017-10-01 PROCEDURE — 99900035 HC TECH TIME PER 15 MIN (STAT)

## 2017-10-01 PROCEDURE — 85730 THROMBOPLASTIN TIME PARTIAL: CPT

## 2017-10-01 PROCEDURE — 82803 BLOOD GASES ANY COMBINATION: CPT

## 2017-10-01 PROCEDURE — 83735 ASSAY OF MAGNESIUM: CPT

## 2017-10-01 PROCEDURE — 85610 PROTHROMBIN TIME: CPT

## 2017-10-01 PROCEDURE — 94761 N-INVAS EAR/PLS OXIMETRY MLT: CPT

## 2017-10-01 PROCEDURE — 99233 SBSQ HOSP IP/OBS HIGH 50: CPT | Mod: GC,,, | Performed by: INTERNAL MEDICINE

## 2017-10-01 PROCEDURE — 80069 RENAL FUNCTION PANEL: CPT | Mod: 91

## 2017-10-01 PROCEDURE — 95957 EEG DIGITAL ANALYSIS: CPT

## 2017-10-01 PROCEDURE — 82150 ASSAY OF AMYLASE: CPT

## 2017-10-01 PROCEDURE — 25000003 PHARM REV CODE 250: Performed by: INTERNAL MEDICINE

## 2017-10-01 RX ORDER — LEVETIRACETAM 5 MG/ML
500 INJECTION INTRAVASCULAR EVERY 12 HOURS
Status: DISCONTINUED | OUTPATIENT
Start: 2017-10-01 | End: 2017-10-03 | Stop reason: SDUPTHER

## 2017-10-01 RX ADMIN — LABETALOL HYDROCHLORIDE 10 MG: 5 INJECTION, SOLUTION INTRAVENOUS at 09:10

## 2017-10-01 RX ADMIN — ATORVASTATIN CALCIUM 10 MG: 10 TABLET, FILM COATED ORAL at 09:10

## 2017-10-01 RX ADMIN — CHLORHEXIDINE GLUCONATE 15 ML: 1.2 RINSE ORAL at 09:10

## 2017-10-01 RX ADMIN — FAMOTIDINE 20 MG: 20 TABLET, FILM COATED ORAL at 09:10

## 2017-10-01 RX ADMIN — HYDRALAZINE HYDROCHLORIDE 100 MG: 50 TABLET ORAL at 05:10

## 2017-10-01 RX ADMIN — AMPHOTERICIN B 200 MG: 50 INJECTION, POWDER, LYOPHILIZED, FOR SOLUTION INTRAVENOUS at 09:10

## 2017-10-01 RX ADMIN — HYDRALAZINE HYDROCHLORIDE 100 MG: 50 TABLET ORAL at 09:10

## 2017-10-01 RX ADMIN — AMLODIPINE BESYLATE 5 MG: 5 TABLET ORAL at 09:10

## 2017-10-01 RX ADMIN — LEVETIRACETAM 500 MG: 500 SOLUTION ORAL at 09:10

## 2017-10-01 RX ADMIN — LEVETIRACETAM 500 MG: 5 INJECTION INTRAVENOUS at 09:10

## 2017-10-01 NOTE — PROGRESS NOTES
[facility]  Nephrology  Progress Note    Patient Name: Volodymyr Pope  MRN: [id number]  Admission Date: 9/23/2017  Hospital Length of Stay: 8 days  Attending Provider: Julio Granger MD   Primary Care Physician: Bulmaro Flores  Principal Problem:Status epilepticus    Subjective:     HPI: 53 yearold male with AIDS (CD 49 not on ART or PPx), ESRD on HD (M,W,F), and seizure disorder presents as a transfer from [facility] for Neurology eval and LP.  Patient presented to OSH on 9/15 from a dialysis center for HTN.  He was admitted for HTN urgency and routine workup revealed thrombocytopenia of 19,000.  His hospital course was complicated by AMS with lethargy.  MRI revealed multiple areas concerning for embolic strokes with seziure activity.  Neurology initially felt his AMS and resulting seizure was 2/2 to stroke and started him on Keppra, ASA and stain.  His mental status initially improved however he was found to be HTN with a fever and spent a brief time in the ICU.  Patient was started on ceftriaxone and Vanc given concern for UTI.  After stepping down to the floor patient was once again found to be lethargic and confused.  Neurology wished to pursue LP but due to platelet count of 52,000 they felt uncomfortable due to lack of Neurosurgery and this is the reason for transfer to [facility].  Other PMH significant for poorly controlled HTN and HIV/?AIDS with uncertain compliance.  Patient reportedly initially was Aox4, but when I see him he is not alert, not answering questions, arousable, but barely.     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    Prior to hospital admit in [place] he was receiving HD at [facility] in [place] on MWF through a R sided tunneled access line (reportedly w/o access problems and access placed in May), his nephrologist in [place] is Dr Hammond, treatment duration 4hrs, EDW: 78 kg, Normal UF 1-2 L, he does have residual renal function, uncertain how much, although noted he has made about 600ccs of urine overnight.    He reportedly received dialysis this past Friday, his lytes, acid/base and volume status are stable this morning, he is actually net negative 400ccs overnight, and appears to be below his EDW.      Interval History: seems more alert    Review of patient's allergies indicates:  No Known Allergies  Current Facility-Administered Medications   Medication Frequency    0.9%  NaCl infusion (CRRT USE ONLY) PRN    0.9%  NaCl infusion (for blood administration) Q24H PRN    0.9%  NaCl infusion (for blood administration) Q24H PRN    0.9%  NaCl infusion (for blood administration) Q24H PRN    acetaminophen oral solution 650 mg Q6H PRN    acetaminophen tablet 500 mg Daily PRN    amlodipine tablet 5 mg Daily    amphotericin B liposome (AMBISOME) 200 mg in dextrose 5 % 200 mL IVPB Q24H    atorvastatin tablet 10 mg QHS    azithromycin tablet 1,200 mg Weekly    chlorhexidine 0.12 % solution 15 mL BID    famotidine tablet 20 mg Daily    hydrALAZINE injection 10 mg Q4H PRN    hydrALAZINE tablet 100 mg Q8H    labetalol injection 10 mg Q4H PRN    levetiracetam oral soln Soln 500 mg BID    magnesium sulfate 2g in water 50mL IVPB (premix) PRN    magnesium sulfate 2g in water 50mL IVPB (premix) PRN    magnesium sulfate 2g in water 50mL IVPB (premix) PRN    sulfamethoxazole-trimethoprim 800-160mg per tablet 1 tablet Every Mon, Wed, Fri       Objective:     Vital Signs (Most Recent):  Temp: 98.9 °F (37.2 °C) (10/01/17 1100)  Pulse: 104 (10/01/17 1300)  Resp: 14 (10/01/17 1300)  BP: (!) 151/101 (10/01/17 1300)  SpO2: 100 %  (10/01/17 1300)  O2 Device (Oxygen Therapy): nasal cannula (10/01/17 1300) Vital Signs (24h Range):  Temp:  [97.8 °F (36.6 °C)-99.8 °F (37.7 °C)] 98.9 °F (37.2 °C)  Pulse:  [101-122] 104  Resp:  [12-49] 14  SpO2:  [95 %-100 %] 100 %  BP: (140-151)/() 151/101  Arterial Line BP: (129-179)/(72-95) 151/87     Weight: 78.8 kg (173 lb 11.6 oz) (09/27/17 0705)  Body mass index is 23.56 kg/m².  Body surface area is 2 meters squared.    I/O last 3 completed shifts:  In: 2530 [I.V.:2280; IV Piggyback:250]  Out: 3157 [Other:3157]    Physical Exam   HENT:   Head: Atraumatic.   Cardiovascular: Normal rate and regular rhythm.  Exam reveals no friction rub.    Pulmonary/Chest: Effort normal and breath sounds normal.   Abdominal: Soft. He exhibits no distension.   Musculoskeletal: He exhibits edema.   Neurological: He is alert.   Skin: Skin is warm.           Assessment/Plan:     ESRD (end stage renal disease)    -Patient is ESRD on HD since May 2017? Underlying etiology uncertain, ?HIV nephropathy, reportedly last received session on Friday.     -continues stable this morning, labs and Is & Os reviewed, s/p SLED overnight and tolerated well without issues, will hold for now and tentatively only run SLED every other night, depending on how things develop            Thank you for your consult. I will follow-up with patient. Please contact us if you have any additional questions.    Félix Gomez MD  Nephrology  [facility]

## 2017-10-01 NOTE — PT/OT/SLP EVAL
Occupational Therapy  Evaluation/Treatment     Volodymyr Pope   MRN: [id number]   Admitting Diagnosis: Status epilepticus    OT Date of Treatment: 10/01/17   OT Start Time: 1227  OT Stop Time: 1250  OT Total Time (min): 23 min    Billable Minutes:  Evaluation 15  Therapeutic Activity 08    Diagnosis: Status epilepticus     Past Medical History:   Diagnosis Date    ESRD on hemodialysis     HIV (human immunodeficiency virus infection)     Seizures       Past Surgical History:   Procedure Laterality Date    LUMBAR PUNCTURE  9/26/2017          Referring physician: Ramon Daigle NP  Date referred to OT: 9/29/17    General Precautions: Standard, aspiration, seizure, fall  Orthopedic Precautions: N/A  Braces: N/A    Patient History/Prior level of function:  Living Environment  Living Environment Comment: Pt unable to report history or PLOF.  No family present.      Dominant hand: right    Subjective:  Communicated with RN prior to session.  Chief Complaint: none stated  Patient/Family stated goals: none stated     Pain/Comfort  Pain Rating 1: 0/10  Pain Rating Post-Intervention 1: 0/10    Objective:  Patient found with: pulse ox (continuous), telemetry, arterial line, oxygen, peripheral IV, restraints    Cognitive Exam:  Oriented to: Person  Follows Commands/attention: Easily distracted and Follows one-step commands  Communication: clear/fluent  Memory:  Impaired   Safety awareness/insight to disability: impaired  Coping skills/emotional control: Appropriate to situation    Visual/perceptual:  Needs further assessment; pt unable to accurately read numbers from screen or therapist's name on badge    Physical Exam:  Postural examination/scapula alignment: Rounded shoulder and Head forward  Skin integrity: Visible skin intact  Edema: Mild RUE    Sensation:   Intact BUEs    Upper Extremity Range of Motion:  Right Upper Extremity: limited extension of digits/wrist/hand due to swelling and tone; able to achieve neutral  position with stretching/weight bearing; shoulder PROM limited at end range for shoulder flexion; elbow ROM WFL  Left Upper Extremity: WFL    Upper Extremity Strength:  Right Upper Extremity: impaired by tone  Left Upper Extremity: limited at shoulder    Strength: Good bilaterally     Fine motor coordination:   Impaired:  Left hand, finger to nose   Right hand, finger to nose   Right hand thumb/finger opposition skills   Right hand, manipulation of objects    Functional Mobility:  Bed Mobility:  Supine to Sit: Maximum Assistance  Sit to Supine: Total Assistance    Activities of Daily Living:  Grooming Position: Seated, EOB  Grooming Level of Assistance: Minimum assistance for face washing; [place] assist required to initiate task    Balance:   Pt sat EOB with CGA (demo'd forward flexed posture, rounded shoulder, head forwards)  Pt required occasional increased assist due to hallucinations and impulsively reaching to grasp objects out of base of support requiring assist to maintain safety while EOB    Additional:  -Pt alert with eyes open throughout session; pt able to state orientation to person only  -High distrability  -Identified 4/5 self-care objects correctly   -Communication board updated; no family present for education  -PROM performed to RUE to maintain tissue elasticity  -Following PROM, pt participated in weight bearing to RUE while seated EOB  -Pt educated on OT role/POC    AM-PAC 6 CLICK ADL  How much help from another person does this patient currently need?  1 = Unable, Total/Dependent Assistance  2 = A lot, Maximum/Moderate Assistance  3 = A little, Minimum/Contact Guard/Supervision  4 = None, Modified [place]/Independent    Putting on and taking off regular lower body clothing? : 1  Bathing (including washing, rinsing, drying)?: 2  Toileting, which includes using toilet, bedpan, or urinal? : 1  Putting on and taking off regular upper body clothing?: 2  Taking care of personal grooming such as  "brushing teeth?: 3  Eating meals?: 1  Total Score: 10    AM-PAC Raw Score CMS "G-Code Modifier Level of Impairment Assistance   6 % Total / Unable   7 - 9 CM 80 - 100% Maximal Assist   10-14 CL 60 - 80% Moderate Assist   15 - 19 CK 40 - 60% Moderate Assist   20 - 22 CJ 20 - 40% Minimal Assist   23 CI 1-20% SBA / CGA   24 CH 0% Independent/ Mod I       Patient left supine with all lines intact, call button in reach, restraints reapplied at end of session and RN present    Assessment:  Volodymyr Pope is a 52 y.o. male with a medical diagnosis of Status epilepticus and presents with confusion/delirium this day limiting ability to reports accurate history or PLOF.  Pt currently requires significant assist for all ADLs/mobilty and will benefit from continued skilled OT services to maximize functional independence and determine appropriate d/c recommendations.    Pt presented with a moderate complexity OT evaluation. Pt required an extensive and expanded review of medical history and occupational profile. Pt demod 5+ performance deficits (physical, cognitive, or psychosocial) resulting in limitations and engagement restrictions. Clinical decision making required analytical complexity with multiple treatment options. Pt with cormorbidities and required moderate modification of task/assistance with assessment.       Physical- skills refer to impairments of body structure or functions, balance, mobility; strength, endurance, FMC, GMC, sensation, dexterity, and posture.  Cognitive- skills refer to ability to attend, communicate, perceive, think, understand, problem solve, mentally sequence, learn, and remember resulting in ability to organize occupational performance in timely and safe manner.   Psychosocial- skills refer to interpersonal interactions, habits, routines, and behaviors, active use of coping strategies, and environmental adaptations to appropriately participate in everyday tasks and situations.    Rehab " identified problem list/impairments: Rehab identified problem list/impairments: weakness, impaired self care skills, impaired endurance, impaired functional mobilty, impaired cognition, decreased upper extremity function, decreased lower extremity function, abnormal tone, impaired fine motor, impaired cardiopulmonary response to activity, edema, impaired coordination, decreased safety awareness, impaired balance, decreased coordination    Rehab potential is fair.    Activity tolerance: Good    Discharge recommendations: Discharge Facility/Level Of Care Needs: nursing facility, skilled     Barriers to discharge: Barriers to Discharge: Decreased caregiver support (Unsure of pt's current living environment)    Equipment recommendations:  (TBD once accurate history is obtained)     GOALS:    Occupational Therapy Goals        Problem: Occupational Therapy Goal    Goal Priority Disciplines Outcome Interventions   Occupational Therapy Goal     OT, PT/OT Ongoing (interventions implemented as appropriate)    Description:  Goals to be met by: 7 days    Patient will increase functional independence with ADLs by performing:    Feeding with Set-up Assistance (once diet is advanced).  UE Dressing with Moderate Assistance.  LE Dressing with Maximum Assistance.  Grooming while seated with Minimal Assistance.  Toileting from bedside commode with Maximum Assistance for hygiene and clothing management.   Rolling to Bilateral with Modified [place].   Supine to sit with Contact Guard Assistance.  Toilet transfer to bedside commode with Maximum Assistance.                      PLAN:  Patient to be seen 3 x/week to address the above listed problems via self-care/home management, therapeutic activities, therapeutic exercises, neuromuscular re-education, cognitive retraining  Plan of Care expires: 10/31/17  Plan of Care reviewed with: patient    Piter K SHAILESH Shannon  10/01/2017

## 2017-10-01 NOTE — PLAN OF CARE
Problem: Occupational Therapy Goal  Goal: Occupational Therapy Goal  Goals to be met by: 7 days    Patient will increase functional independence with ADLs by performing:    Feeding with Set-up Assistance (once diet is advanced).  UE Dressing with Moderate Assistance.  LE Dressing with Maximum Assistance.  Grooming while seated with Minimal Assistance.  Toileting from bedside commode with Maximum Assistance for hygiene and clothing management.   Rolling to Bilateral with Modified [place].   Supine to sit with Contact Guard Assistance.  Toilet transfer to bedside commode with Maximum Assistance.    Outcome: Ongoing (interventions implemented as appropriate)  OT goals set.  SHAILESH Fine  10/1/2017

## 2017-10-01 NOTE — SUBJECTIVE & OBJECTIVE
Interval History: Patient evaluated at bedside was oriented to person and place. Had periods of confusion and visual hallucinations. No major event during night     Review of Systems   Constitutional: Negative for activity change, chills, diaphoresis, fatigue and fever.   Respiratory: Negative for cough and shortness of breath.    Gastrointestinal: Negative for abdominal distention, abdominal pain, diarrhea, nausea and vomiting.   Psychiatric/Behavioral: Positive for confusion and hallucinations.     Objective:     Vital Signs (Most Recent):  Temp: 98.5 °F (36.9 °C) (10/01/17 1500)  Pulse: 106 (10/01/17 1600)  Resp: 14 (10/01/17 1700)  BP: (!) 140/96 (10/01/17 1600)  SpO2: 98 % (10/01/17 1600) Vital Signs (24h Range):  Temp:  [98.5 °F (36.9 °C)-99.8 °F (37.7 °C)] 98.5 °F (36.9 °C)  Pulse:  [] 106  Resp:  [12-49] 14  SpO2:  [95 %-100 %] 98 %  BP: (140-151)/() 140/96  Arterial Line BP: (134-179)/(76-95) 157/91     Weight: 78.8 kg (173 lb 11.6 oz)  Body mass index is 23.56 kg/m².    Estimated Creatinine Clearance: 24.7 mL/min (based on SCr of 3.8 mg/dL (H)).    Physical Exam   Constitutional: He appears well-developed and well-nourished.   Eyes: EOM are normal. Pupils are equal, round, and reactive to light.   Neck: Normal range of motion.   Cardiovascular: Normal rate, regular rhythm and normal heart sounds.    Pulmonary/Chest: Effort normal and breath sounds normal.   Abdominal: Soft. Bowel sounds are normal. He exhibits no distension. There is no tenderness. There is no rebound.   Neurological: He is alert.   Skin: No rash noted.       Significant Labs:   Blood Culture:   Recent Labs  Lab 09/23/17 2122 09/24/17  0013 09/27/17  0617 09/27/17  0625 09/28/17  1030   LABBLOO No growth after 5 days. No growth after 5 days. No Growth to date  No Growth to date  No Growth to date  No Growth to date  No Growth to date No Growth to date  No Growth to date  No Growth to date  No Growth to date  No Growth  to date No Growth to date  No Growth to date  No Growth to date  No Growth to date     BMP:   Recent Labs  Lab 10/01/17  1602   GLU 80      K 4.6      CO2 24   BUN 11   CREATININE 3.8*   CALCIUM 7.8*   MG 1.7     CBC:   Recent Labs  Lab 09/30/17  0209 10/01/17  0147   WBC 3.09* 3.00*   HGB 8.4* 9.1*   HCT 25.5* 27.4*   PLT 61* 62*     Microbiology Results (last 7 days)     Procedure Component Value Units Date/Time    Blood culture [[id number]] Collected:  09/28/17 1030    Order Status:  Completed Specimen:  Blood from Line, Subclavian, Right Updated:  10/01/17 1412     Blood Culture, Routine No Growth to date     Blood Culture, Routine No Growth to date     Blood Culture, Routine No Growth to date     Blood Culture, Routine No Growth to date    Narrative:       Please draw from R tunneled catheter    Blood culture [[id number]] Collected:  09/27/17 0617    Order Status:  Completed Specimen:  Blood from Peripheral, Antecubital, Right Updated:  10/01/17 1012     Blood Culture, Routine No Growth to date     Blood Culture, Routine No Growth to date     Blood Culture, Routine No Growth to date     Blood Culture, Routine No Growth to date     Blood Culture, Routine No Growth to date    Narrative:       Blood cultures from 2 different sites. 4 bottles total.  Please draw before starting antibiotics.    Blood culture [[id number]] Collected:  09/27/17 0625    Order Status:  Completed Specimen:  Blood from Peripheral, Foot, Left Updated:  10/01/17 1012     Blood Culture, Routine No Growth to date     Blood Culture, Routine No Growth to date     Blood Culture, Routine No Growth to date     Blood Culture, Routine No Growth to date     Blood Culture, Routine No Growth to date    Narrative:       Blood cultures x 2 different sites. 4 bottles total. Please  draw cultures before administering antibiotics.    CSF culture [[id number]] Collected:  09/26/17 1442    Order Status:  Completed Specimen:  CSF (Spinal Fluid) from  CSF Tap, Tube 3 Updated:  10/01/17 0708     CSF CULTURE No Growth     Gram Stain Result Cytospin indicates:      No WBC's      No organisms seen    Culture, Respiratory with Gram Stain [[id number]] Collected:  09/27/17 0709    Order Status:  Completed Specimen:  Respiratory from Endotracheal Aspirate Updated:  09/29/17 1110     Respiratory Culture Normal respiratory brittany     Gram Stain (Respiratory) <10 epithelial cells per low power field.     Gram Stain (Respiratory) Many WBC's     Gram Stain (Respiratory) No organisms seen    Narrative:       Mini-BAL.    Blood culture [[id number]] Collected:  09/24/17 0013    Order Status:  Completed Specimen:  Blood Updated:  09/29/17 0612     Blood Culture, Routine No growth after 5 days.    Narrative:       OBTAIN from Central line please    Blood culture [[id number]] Collected:  09/23/17 2122    Order Status:  Completed Specimen:  Blood Updated:  09/28/17 2312     Blood Culture, Routine No growth after 5 days.    Narrative:       Collection has been rescheduled by RNS at 9/23/2017 20:40 Reason: dr. garza in.  Collection has been rescheduled by RNS at 9/23/2017 20:48 Reason: dr.   still with pt.  Collection has been rescheduled by RNS at 9/23/2017 20:40 Reason: dr. garza in.  Collection has been rescheduled by RNS at 9/23/2017 20:48 Reason: dr.   still with pt.    Blood culture [[id number]] Collected:  09/23/17 2039    Order Status:  Completed Specimen:  Blood Updated:  09/28/17 2312     Blood Culture, Routine No growth after 5 days.    Blood culture [[id number]]     Order Status:  Canceled Specimen:  Blood     Blood culture [[id number]]     Order Status:  Canceled Specimen:  Blood     Culture, Respiratory with Gram Stain [[id number]]     Order Status:  Canceled Specimen:  Respiratory     AFB Culture & Smear [[id number]] Collected:  09/26/17 1442    Order Status:  Completed Specimen:  CSF (Spinal Fluid) from CSF Tap, Tube 3 Updated:  09/27/17 2127     AFB Culture & Smear Culture  in progress     AFB CULTURE STAIN No acid fast bacilli seen.    Cryptococcal antigen, CSF [[id number]] Collected:  09/26/17 1442    Order Status:  Completed Specimen:  CSF (Spinal Fluid) from CSF Tap, Tube 3 Updated:  09/27/17 1009     Crypto Ag, CSF Negative    AFB Culture & Smear [[id number]] Collected:  09/26/17 0611    Order Status:  Completed Specimen:  Blood from Blood Updated:  09/27/17 0927     AFB Culture & Smear Culture in progress    Gram stain [[id number]] Collected:  09/26/17 1442    Order Status:  Canceled Specimen:  CSF (Spinal Fluid) from CSF Tap, Tube 3 Updated:  09/26/17 1616    Fungus Culture, Blood or Bone Marrow [[id number]] Collected:  09/26/17 0611    Order Status:  No result Specimen:  Blood from Arm, Left Updated:  09/26/17 0753    Fungus culture [[id number]] Collected:  09/26/17 0611    Order Status:  Canceled Specimen:  Blood from Arm, Left Updated:  09/26/17 0627    Urine culture [[id number]]     Order Status:  Canceled Specimen:  Urine           Significant Imaging: I have reviewed all pertinent imaging results/findings within the past 24 hours.

## 2017-10-01 NOTE — MEDICAL/APP STUDENT
Progress Note  Infectious Diseases    Patient Name: Volodymyr Pope  YOB: 1965    Admit Date: 9/23/2017                     LOS: 8    SUBJECTIVE:     Reason for Admission:  Status epilepticus  See H&P for detailed presentating history and ROS.      Interval history:     Overnight, no problems. He received CRRT overnight. No cough, fevers/chills, or night sweats. He has been put on a pureed diet starting today. He is getting an EEG today. He was able to discuss why he came into the hospital and appropriate time course, but wasn't quite sure where he is other than in a hospital. Nurse reported some hallucinations overnight. He states that he has been on HIV therapy before, but stopped and was not able to give a stop date.       OBJECTIVE:     Vital Signs Range (Last 24H):  Temp:  [97.8 °F (36.6 °C)-99.8 °F (37.7 °C)]   Pulse:  [101-122]   Resp:  [12-49]   BP: (140-149)/(90-92)   SpO2:  [95 %-100 %]   Arterial Line BP: (129-179)/(72-95) Body mass index is 23.56 kg/m².  Wt Readings from Last 1 Encounters:   09/27/17 0705 78.8 kg (173 lb 11.6 oz)   09/23/17 1957 70.6 kg (155 lb 9.6 oz)       Physical Exam:  General: well developed, well nourished, no distress  Eyes: conjunctivae/corneas clear. PERRL.   Lungs:  clear to auscultation bilaterally and normal respiratory effort  Cardiovascular: Heart: regular rate and rhythm, S1, S2 normal, no murmur, click, rub or gallop.  Abdomen/Rectal: Abdomen: soft, non-tender non-distented; bowel sounds normal; no masses,  no organomegaly.  Neurologic: Normal strength and tone. No focal numbness or weakness  Mental status: alertness: alert, orientation: time, person  Psych/Behavioral:  Behavior: normal and Thought content: tangential    Diagnostic Results:  Lab Results   Component Value Date    WBC 3.00 (L) 10/01/2017    HGB 9.1 (L) 10/01/2017    HCT 27.4 (L) 10/01/2017    MCV 89 10/01/2017    PLT 62 (L) 10/01/2017       Recent Labs  Lab 10/01/17  0147 10/01/17  0954   GLU  80  80  --      141  --    K 4.3  4.3  --      109  --    CO2 26  26  --    BUN 5*  5*  --    CREATININE 1.9*  1.9*  --    CALCIUM 8.0*  8.0*  --    MG  --  1.7     Viral Testing: Neg for EBV, CMV, HSV, VZV, West Nile, and Crypto in CSF  Positive CMV IgG, HSV, and HHV-g IgG titer 1:320 in blood. Negative Crypto antigen in blood  HIV RNA 58,053 with 4.76 log units. CD4 count on 9/23 was 50, with 18.3%  Minimally reactive FTA-ABS with negative RPR  Hepatitis panel pending  Urine histo and MAC pending  9/26 AFB gram stain negative, with AFB culture in process  9/27 blood cultures NGTD  9/28 blood cultures NGTD      Imaging    9/27 CT Abdo/Pelvis:   1.  Multiple prominent and mildly enlarged periaortic lymph nodes.  These are of uncertain etiology and could possibly be reactive versus sequela of lymphoproliferative process.  Clinical correlation and further evaluation as warranted.  2.  Diffuse gallbladder wall thickening, which could be reactive secondary to underlying hepatic dysfunction.  If there is clinical concern for acute cholecystitis, further evaluation with ultrasound can be performed.  Mild periportal edema and mild hepatomegaly.  3.  Bibasilar atelectatic versus consolidative change, clinical correlation advised.  Endotracheal tube in place.  4.  Nonspecific small volume of free pelvic fluid.    9/25 MRI Brain with Contrast:  Multifocal gyral diffusion restriction and T2/FLAIR hyperintensity involving the bilateral frontal and parietal lobes (particularly the left frontal eye field) could be related to HIV encephalopathy/HIV associated infection, seizure activity, or multifocal infarcts.  Progressive multifocal leukoencephalopathy can be seen in HIV/AIDS with and can affect gray matter in late stages of the disease, however it usually affects the subcortical U fibers which are mostly spared in this case.  Correlation with CSF analysis is recommended.  T2/FLAIR hyperintensity  predominately affecting the periventricular white matter may be related to HIV associated encephalopathy and/or chronic white matter ischemic change.  Negative MRA of the head and neck.    ASSESSMENT/PLAN:     Active Hospital Problems    Diagnosis  POA    *Status epilepticus [G40.901]  Yes    Chronic pancreatitis [K86.1]  Yes    Acute respiratory failure with hypoxia and hypercapnia [J96.01, J96.02]  Yes    Santiago coma scale total score 3-8 [R40.2430]  Yes    Alteration in skin integrity [R23.9]  Yes    Acute encephalopathy [G93.40]  Yes    AIDS [B20]  Yes    Essential hypertension [I10]  Yes    ESRD (end stage renal disease) [N18.6]  Yes    Thrombocytopenia [D69.6]  Yes      Resolved Hospital Problems    Diagnosis Date Resolved POA   No resolved problems to display.       Plan  Continue Azithromycin 1200mg qweekly , Amphotericin 200mg IV q24hrs, and Bactrim 800-160mg MWF  F/u urine histoplasma. May be able to stop amphotericin once urine histo results  F/u MAC cultures. Lymphadenopathy seen on CT may be related to MAC  F/u hepatitis panel  Genotype HIV on Monday for potential HAART start due to AMS etiology possibly being HIV encephalopathy based on MRI findings    Tracy Haji  MS4
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
## 2017-10-01 NOTE — SUBJECTIVE & OBJECTIVE
Interval History: seems more alert    Review of patient's allergies indicates:  No Known Allergies  Current Facility-Administered Medications   Medication Frequency    0.9%  NaCl infusion (CRRT USE ONLY) PRN    0.9%  NaCl infusion (for blood administration) Q24H PRN    0.9%  NaCl infusion (for blood administration) Q24H PRN    0.9%  NaCl infusion (for blood administration) Q24H PRN    acetaminophen oral solution 650 mg Q6H PRN    acetaminophen tablet 500 mg Daily PRN    amlodipine tablet 5 mg Daily    amphotericin B liposome (AMBISOME) 200 mg in dextrose 5 % 200 mL IVPB Q24H    atorvastatin tablet 10 mg QHS    azithromycin tablet 1,200 mg Weekly    chlorhexidine 0.12 % solution 15 mL BID    famotidine tablet 20 mg Daily    hydrALAZINE injection 10 mg Q4H PRN    hydrALAZINE tablet 100 mg Q8H    labetalol injection 10 mg Q4H PRN    levetiracetam oral soln Soln 500 mg BID    magnesium sulfate 2g in water 50mL IVPB (premix) PRN    magnesium sulfate 2g in water 50mL IVPB (premix) PRN    magnesium sulfate 2g in water 50mL IVPB (premix) PRN    sulfamethoxazole-trimethoprim 800-160mg per tablet 1 tablet Every Mon, Wed, Fri       Objective:     Vital Signs (Most Recent):  Temp: 98.9 °F (37.2 °C) (10/01/17 1100)  Pulse: 104 (10/01/17 1300)  Resp: 14 (10/01/17 1300)  BP: (!) 151/101 (10/01/17 1300)  SpO2: 100 % (10/01/17 1300)  O2 Device (Oxygen Therapy): nasal cannula (10/01/17 1300) Vital Signs (24h Range):  Temp:  [97.8 °F (36.6 °C)-99.8 °F (37.7 °C)] 98.9 °F (37.2 °C)  Pulse:  [101-122] 104  Resp:  [12-49] 14  SpO2:  [95 %-100 %] 100 %  BP: (140-151)/() 151/101  Arterial Line BP: (129-179)/(72-95) 151/87     Weight: 78.8 kg (173 lb 11.6 oz) (09/27/17 0705)  Body mass index is 23.56 kg/m².  Body surface area is 2 meters squared.    I/O last 3 completed shifts:  In: 2530 [I.V.:2280; IV Piggyback:250]  Out: 3157 [Other:3157]    Physical Exam   HENT:   Head: Atraumatic.   Cardiovascular: Normal rate  and regular rhythm.  Exam reveals no friction rub.    Pulmonary/Chest: Effort normal and breath sounds normal.   Abdominal: Soft. He exhibits no distension.   Musculoskeletal: He exhibits edema.   Neurological: He is alert.   Skin: Skin is warm.

## 2017-10-01 NOTE — PLAN OF CARE
Problem: Patient Care Overview  Goal: Plan of Care Review  Outcome: Ongoing (interventions implemented as appropriate)  POC reviewed with pt and pts mother at 0500. Pts mother verbalized understanding. Questions and concerns addressed. No acute events overnight. Pt remains confused and was hallucinating people and rats in the room.   Pt progressing toward goals. Pt received CRRT overnight.  Rinsed back and removed by dialysis at 0230.  Will continue to monitor. See flowsheets for full assessment and VS info

## 2017-10-01 NOTE — ASSESSMENT & PLAN NOTE
Case of 53 y/o male with PMHX of HIV currently AIDS not treated at this time with no follow up in out patient to our knowledge. CD4 count is 42.      -Bactrim for PCP prophylaxis.  Monitor potassium.  -Azithromycin 1200 mg once a week for MAC prophylaxis  -Ordered genotype to be drawn on Monday.  Will plan to start HAART afterwards  - will follow Hepatitis panel results to evaluate if coinfected

## 2017-10-01 NOTE — ASSESSMENT & PLAN NOTE
51 y/o with AIDS admitted with encephalopathy.  Mental status is still poor but appears to be a bit better.  MRI imaging revealed abnormalities.  Differential includes HIV encephalopathy.  LEAH virus also possible.  Therapy for both of these is to improve the immune system with HAART.  Awaiting results of serum histo antigen.  Will likely start HAART after results return.    - HIV genotype tomorrow  - follow histoplasma results

## 2017-10-01 NOTE — NURSING
Patient de-sating in the upper 70s/ low80s on RA. Pt placed on non-rebreather and slowly coming up. SpO2 currently at 85%. RT and MD at bedside. Pt remains oriented and does not appear to be in distress. Stat chest xray and ABG ordered. Event occurred shortly after PO meds given. Possible aspiration.

## 2017-10-01 NOTE — PROGRESS NOTES
ICU Progress Note  Neurocritical Care    Admit Date: 9/23/2017  LOS: 8    CC: Status epilepticus    Code Status: Full Code     SUBJECTIVE:     Interval History/Significant Events:   Acute desaturation after PO meds  Sao2; on low 80's/   Awake following commands  HIV encephalopathy  ESRD  Acute aspiration  hypophosphatemia  Elect. abnl      Medications:  Continuous Infusions:   Scheduled Meds:   amlodipine  5 mg Per NG tube Daily    amphotericin B liposome (AMBISOME) IVPB  3 mg/kg Intravenous Q24H    atorvastatin  10 mg Per NG tube QHS    azithromycin  1,200 mg Oral Weekly    chlorhexidine  15 mL Mouth/Throat BID    famotidine  20 mg Per NG tube Daily    hydrALAZINE  100 mg Per NG tube Q8H    levetiracetam oral soln  500 mg Per NG tube BID    sulfamethoxazole-trimethoprim 800-160mg  1 tablet Oral Every Mon, Wed, Fri     PRN Meds:.sodium chloride 0.9%, sodium chloride, sodium chloride, sodium chloride, acetaminophen, acetaminophen, hydrALAZINE, labetalol, magnesium sulfate IVPB, magnesium sulfate IVPB, magnesium sulfate IVPB    OBJECTIVE:   Vital Signs (Most Recent):   Temp: 99.8 °F (37.7 °C) (10/01/17 0700)  Pulse: 101 (10/01/17 1025)  Resp: 12 (10/01/17 1025)  BP: (!) 149/92 (10/01/17 1000)  SpO2: 100 % (10/01/17 1025)    Vital Signs (24h Range):   Temp:  [97.8 °F (36.6 °C)-99.8 °F (37.7 °C)] 99.8 °F (37.7 °C)  Pulse:  [101-122] 101  Resp:  [12-49] 12  SpO2:  [95 %-100 %] 100 %  BP: (140-149)/(90-92) 149/92  Arterial Line BP: (129-179)/(72-95) 134/83    ICP/CPP (Last 24h):        I & O (Last 24h):   Intake/Output Summary (Last 24 hours) at 10/01/17 1054  Last data filed at 10/01/17 0600   Gross per 24 hour   Intake             2200 ml   Output             3157 ml   Net             -957 ml     Physical Exam:  GA: Alert, comfortable, no acute distress.   HEENT: No scleral icterus or JVD.   Pulmonary: Clear to auscultation A/P/L. No wheezing, crackles, or rhonchi.  Cardiac: RRR S1 & S2 w/o  rubs/murmurs/gallops.   Abdominal: Bowel sounds present x 4. No appreciable hepatosplenomegaly.  Skin: No jaundice, rashes, or visible lesions.  Neuro:  Awake  Alert talking           Lines/Drains/Airway:          Tunneled Central Line Insertion/Assessment - Double Lumen  09/23/17 1100 right internal jugular (Active)   Dressing biopatch in place;dressing dry and intact 10/1/2017  3:02 AM   IV Device Securement sutures 10/1/2017  3:02 AM   Additional Site Signs no erythema;no warmth;no edema;no pain 10/1/2017  3:02 AM   Distal Patency/Care flushed w/o difficulty;normal saline locked 10/1/2017  3:02 AM   Proximal Patency/Care flushed w/o difficulty;normal saline locked 10/1/2017  3:02 AM   Dressing Change Due 10/03/17 10/1/2017  3:02 AM   Daily Line Review Performed 10/1/2017  3:02 AM           Arterial Line 09/27/17 1808 Left Radial (Active)   Site Assessment Clean;Dry;Intact;No redness;No swelling 10/1/2017  6:00 AM   Line Status Pulsatile blood flow 10/1/2017  6:00 AM   Art Line Waveform Appropriate 10/1/2017  6:00 AM   Arterial Line Interventions Zeroed and calibrated;Leveled;Flushed per protocol 10/1/2017  6:00 AM   Color/Movement/Sensation Capillary refill less than 3 sec 10/1/2017  6:00 AM   Dressing Type Transparent 10/1/2017  6:00 AM   Dressing Status Biopatch in place;Clean;Dry;Intact 10/1/2017  6:00 AM   Dressing Intervention Dressing changed 10/1/2017  6:00 AM   Dressing Change Due 10/10/17 10/1/2017  6:00 AM       Nutrition/Tube Feeds (if NPO state why): npo; aspiration    Labs:  ABG:   Recent Labs  Lab 10/01/17  1020   PH 7.461*   PO2 390*   PCO2 37.5   HCO3 26.7   POCSATURATED 100   BE 3     BMP:  Recent Labs  Lab 10/01/17  0147 10/01/17  0954     141  --    K 4.3  4.3  --      109  --    CO2 26  26  --    BUN 5*  5*  --    CREATININE 1.9*  1.9*  --    GLU 80  80  --    MG  --  1.7   PHOS 1.5*  1.5*  --      LFT: Lab Results   Component Value Date    AST 87 (H) 09/25/2017    ALT 49  (H) 09/25/2017    ALKPHOS 112 09/25/2017    BILITOT 0.5 09/25/2017    ALBUMIN 1.8 (L) 10/01/2017    ALBUMIN 1.8 (L) 10/01/2017    PROT 7.7 09/25/2017     CBC:   Lab Results   Component Value Date    WBC 3.00 (L) 10/01/2017    HGB 9.1 (L) 10/01/2017    HCT 27.4 (L) 10/01/2017    MCV 89 10/01/2017    PLT 62 (L) 10/01/2017     Microbiology x 7d:   Microbiology Results (last 7 days)     Procedure Component Value Units Date/Time    Blood culture [[id number]] Collected:  09/27/17 0617    Order Status:  Completed Specimen:  Blood from Peripheral, Antecubital, Right Updated:  10/01/17 1012     Blood Culture, Routine No Growth to date     Blood Culture, Routine No Growth to date     Blood Culture, Routine No Growth to date     Blood Culture, Routine No Growth to date     Blood Culture, Routine No Growth to date    Narrative:       Blood cultures from 2 different sites. 4 bottles total.  Please draw before starting antibiotics.    Blood culture [[id number]] Collected:  09/27/17 0625    Order Status:  Completed Specimen:  Blood from Peripheral, Foot, Left Updated:  10/01/17 1012     Blood Culture, Routine No Growth to date     Blood Culture, Routine No Growth to date     Blood Culture, Routine No Growth to date     Blood Culture, Routine No Growth to date     Blood Culture, Routine No Growth to date    Narrative:       Blood cultures x 2 different sites. 4 bottles total. Please  draw cultures before administering antibiotics.    CSF culture [[id number]] Collected:  09/26/17 1442    Order Status:  Completed Specimen:  CSF (Spinal Fluid) from CSF Tap, Tube 3 Updated:  10/01/17 0708     CSF CULTURE No Growth     Gram Stain Result Cytospin indicates:      No WBC's      No organisms seen    Blood culture [[id number]] Collected:  09/28/17 1030    Order Status:  Completed Specimen:  Blood from Line, Subclavian, Right Updated:  09/30/17 1412     Blood Culture, Routine No Growth to date     Blood Culture, Routine No Growth to date      Blood Culture, Routine No Growth to date    Narrative:       Please draw from R tunneled catheter    Culture, Respiratory with Gram Stain [[id number]] Collected:  09/27/17 0709    Order Status:  Completed Specimen:  Respiratory from Endotracheal Aspirate Updated:  09/29/17 1110     Respiratory Culture Normal respiratory brittany     Gram Stain (Respiratory) <10 epithelial cells per low power field.     Gram Stain (Respiratory) Many WBC's     Gram Stain (Respiratory) No organisms seen    Narrative:       Mini-BAL.    Blood culture [[id number]] Collected:  09/24/17 0013    Order Status:  Completed Specimen:  Blood Updated:  09/29/17 0612     Blood Culture, Routine No growth after 5 days.    Narrative:       OBTAIN from Central line please    Blood culture [[id number]] Collected:  09/23/17 2122    Order Status:  Completed Specimen:  Blood Updated:  09/28/17 2312     Blood Culture, Routine No growth after 5 days.    Narrative:       Collection has been rescheduled by RNS at 9/23/2017 20:40 Reason: dr. garza in.  Collection has been rescheduled by RNS at 9/23/2017 20:48 Reason: dr.   still with pt.  Collection has been rescheduled by RNS at 9/23/2017 20:40 Reason: dr. garza in.  Collection has been rescheduled by RNS at 9/23/2017 20:48 Reason: dr.   still with pt.    Blood culture [[id number]] Collected:  09/23/17 2039    Order Status:  Completed Specimen:  Blood Updated:  09/28/17 2312     Blood Culture, Routine No growth after 5 days.    Blood culture [[id number]]     Order Status:  Canceled Specimen:  Blood     Blood culture [[id number]]     Order Status:  Canceled Specimen:  Blood     Culture, Respiratory with Gram Stain [[id number]]     Order Status:  Canceled Specimen:  Respiratory     AFB Culture & Smear [[id number]] Collected:  09/26/17 1442    Order Status:  Completed Specimen:  CSF (Spinal Fluid) from CSF Tap, Tube 3 Updated:  09/27/17 2127     AFB Culture & Smear Culture in progress     AFB CULTURE STAIN No acid fast  bacilli seen.    Cryptococcal antigen, CSF [[id number]] Collected:  09/26/17 1442    Order Status:  Completed Specimen:  CSF (Spinal Fluid) from CSF Tap, Tube 3 Updated:  09/27/17 1009     Crypto Ag, CSF Negative    AFB Culture & Smear [[id number]] Collected:  09/26/17 0611    Order Status:  Completed Specimen:  Blood from Blood Updated:  09/27/17 0927     AFB Culture & Smear Culture in progress    Gram stain [[id number]] Collected:  09/26/17 1442    Order Status:  Canceled Specimen:  CSF (Spinal Fluid) from CSF Tap, Tube 3 Updated:  09/26/17 1616    Fungus Culture, Blood or Bone Marrow [[id number]] Collected:  09/26/17 0611    Order Status:  No result Specimen:  Blood from Arm, Left Updated:  09/26/17 0753    Fungus culture [[id number]] Collected:  09/26/17 0611    Order Status:  Canceled Specimen:  Blood from Arm, Left Updated:  09/26/17 0627    Urine culture [[id number]]     Order Status:  Canceled Specimen:  Urine         Imaging:cxr: congested    I personally reviewed the above image.    ASSESSMENT/PLAN:     Active Hospital Problems    Diagnosis    *Status epilepticus    Chronic pancreatitis    Acute respiratory failure with hypoxia and hypercapnia    [place] coma scale total score 3-8    Alteration in skin integrity    Acute encephalopathy    AIDS    Essential hypertension    ESRD (end stage renal disease)    Thrombocytopenia      Plan:  Cxr; stat  abg  Follow exam  Keep npo  Aspiration  Follow Sao2      Very concerned about desaturation and aspiration  Follow closely   May need spiral ct and NGT      Total cc time > 36 mins

## 2017-10-01 NOTE — ASSESSMENT & PLAN NOTE
-Patient is ESRD on HD since May 2017? Underlying etiology uncertain, ?HIV nephropathy, reportedly last received session on Friday.     -continues stable this morning, labs and Is & Os reviewed, s/p SLED overnight and tolerated well without issues, will hold for now and tentatively only run SLED every other night, depending on how things develop

## 2017-10-02 PROBLEM — R56.9 SEIZURE: Status: ACTIVE | Noted: 2017-10-02

## 2017-10-02 LAB
ALBUMIN SERPL BCP-MCNC: 1.6 G/DL
ALBUMIN SERPL BCP-MCNC: 1.6 G/DL
ALBUMIN SERPL BCP-MCNC: 1.7 G/DL
ALBUMIN SERPL BCP-MCNC: 1.8 G/DL
AMYLASE SERPL-CCNC: 165 U/L
ANION GAP SERPL CALC-SCNC: 6 MMOL/L
ANION GAP SERPL CALC-SCNC: 8 MMOL/L
APTT BLDCRRT: 30 SEC
BACTERIA BLD CULT: NORMAL
BACTERIA BLD CULT: NORMAL
BASOPHILS # BLD AUTO: 0.01 K/UL
BASOPHILS NFR BLD: 0.3 %
BUN SERPL-MCNC: 17 MG/DL
BUN SERPL-MCNC: 17 MG/DL
BUN SERPL-MCNC: 21 MG/DL
BUN SERPL-MCNC: 25 MG/DL
CALCIUM SERPL-MCNC: 7.6 MG/DL
CALCIUM SERPL-MCNC: 7.7 MG/DL
CALCIUM SERPL-MCNC: 7.7 MG/DL
CALCIUM SERPL-MCNC: 7.8 MG/DL
CHLORIDE SERPL-SCNC: 107 MMOL/L
CHLORIDE SERPL-SCNC: 107 MMOL/L
CHLORIDE SERPL-SCNC: 109 MMOL/L
CHLORIDE SERPL-SCNC: 109 MMOL/L
CO2 SERPL-SCNC: 23 MMOL/L
CO2 SERPL-SCNC: 24 MMOL/L
CO2 SERPL-SCNC: 24 MMOL/L
CO2 SERPL-SCNC: 25 MMOL/L
CREAT SERPL-MCNC: 4.5 MG/DL
CREAT SERPL-MCNC: 4.8 MG/DL
CREAT SERPL-MCNC: 4.8 MG/DL
CREAT SERPL-MCNC: 6.3 MG/DL
DIFFERENTIAL METHOD: ABNORMAL
EOSINOPHIL # BLD AUTO: 0 K/UL
EOSINOPHIL NFR BLD: 0 %
ERYTHROCYTE [DISTWIDTH] IN BLOOD BY AUTOMATED COUNT: 17.8 %
EST. GFR  (AFRICAN AMERICAN): 10.7 ML/MIN/1.73 M^2
EST. GFR  (AFRICAN AMERICAN): 14.8 ML/MIN/1.73 M^2
EST. GFR  (AFRICAN AMERICAN): 14.8 ML/MIN/1.73 M^2
EST. GFR  (AFRICAN AMERICAN): 16 ML/MIN/1.73 M^2
EST. GFR  (NON AFRICAN AMERICAN): 12.8 ML/MIN/1.73 M^2
EST. GFR  (NON AFRICAN AMERICAN): 12.8 ML/MIN/1.73 M^2
EST. GFR  (NON AFRICAN AMERICAN): 13.9 ML/MIN/1.73 M^2
EST. GFR  (NON AFRICAN AMERICAN): 9.2 ML/MIN/1.73 M^2
GLUCOSE SERPL-MCNC: 74 MG/DL
GLUCOSE SERPL-MCNC: 74 MG/DL
GLUCOSE SERPL-MCNC: 96 MG/DL
GLUCOSE SERPL-MCNC: 99 MG/DL
HAV IGM SERPL QL IA: NEGATIVE
HAV IGM SERPL QL IA: NEGATIVE
HBV CORE IGM SERPL QL IA: NEGATIVE
HBV CORE IGM SERPL QL IA: NEGATIVE
HBV SURFACE AG SERPL QL IA: NEGATIVE
HBV SURFACE AG SERPL QL IA: NEGATIVE
HCT VFR BLD AUTO: 26.1 %
HCV AB SERPL QL IA: NEGATIVE
HCV AB SERPL QL IA: NEGATIVE
HGB BLD-MCNC: 8.5 G/DL
INR PPP: 1.1
LIPASE SERPL-CCNC: 256 U/L
LYMPHOCYTES # BLD AUTO: 0.5 K/UL
LYMPHOCYTES NFR BLD: 18.1 %
MAGNESIUM SERPL-MCNC: 1.8 MG/DL
MAGNESIUM SERPL-MCNC: 2 MG/DL
MCH RBC QN AUTO: 29.4 PG
MCHC RBC AUTO-ENTMCNC: 32.6 G/DL
MCV RBC AUTO: 90 FL
MONOCYTES # BLD AUTO: 0.6 K/UL
MONOCYTES NFR BLD: 19.4 %
NEUTROPHILS # BLD AUTO: 1.8 K/UL
NEUTROPHILS NFR BLD: 61.2 %
PATHOLOGIST INTERPRETATION SPE: NORMAL
PHOSPHATE SERPL-MCNC: 2.1 MG/DL
PHOSPHATE SERPL-MCNC: 3.2 MG/DL
PHOSPHATE SERPL-MCNC: 3.3 MG/DL
PHOSPHATE SERPL-MCNC: 3.3 MG/DL
PLATELET # BLD AUTO: 58 K/UL
PMV BLD AUTO: 10.4 FL
POCT GLUCOSE: 91 MG/DL (ref 70–110)
POTASSIUM SERPL-SCNC: 4.7 MMOL/L
PROTHROMBIN TIME: 11.3 SEC
RBC # BLD AUTO: 2.89 M/UL
SODIUM SERPL-SCNC: 138 MMOL/L
SODIUM SERPL-SCNC: 138 MMOL/L
SODIUM SERPL-SCNC: 141 MMOL/L
SODIUM SERPL-SCNC: 141 MMOL/L
T4 FREE SERPL-MCNC: 0.81 NG/DL
TSH SERPL DL<=0.005 MIU/L-ACNC: 9.18 UIU/ML
WBC # BLD AUTO: 2.99 K/UL

## 2017-10-02 PROCEDURE — 25000003 PHARM REV CODE 250: Performed by: NURSE PRACTITIONER

## 2017-10-02 PROCEDURE — 84443 ASSAY THYROID STIM HORMONE: CPT

## 2017-10-02 PROCEDURE — 85025 COMPLETE CBC W/AUTO DIFF WBC: CPT

## 2017-10-02 PROCEDURE — 63600175 PHARM REV CODE 636 W HCPCS: Performed by: PHYSICIAN ASSISTANT

## 2017-10-02 PROCEDURE — 90945 DIALYSIS ONE EVALUATION: CPT

## 2017-10-02 PROCEDURE — 25000003 PHARM REV CODE 250: Performed by: PHYSICIAN ASSISTANT

## 2017-10-02 PROCEDURE — 99233 SBSQ HOSP IP/OBS HIGH 50: CPT | Mod: GC,,, | Performed by: INTERNAL MEDICINE

## 2017-10-02 PROCEDURE — 25000003 PHARM REV CODE 250: Performed by: INTERNAL MEDICINE

## 2017-10-02 PROCEDURE — 87906 NFCT AGT GNTYP ALYS HIV1: CPT

## 2017-10-02 PROCEDURE — 85730 THROMBOPLASTIN TIME PARTIAL: CPT

## 2017-10-02 PROCEDURE — 20000000 HC ICU ROOM

## 2017-10-02 PROCEDURE — 80069 RENAL FUNCTION PANEL: CPT

## 2017-10-02 PROCEDURE — 85610 PROTHROMBIN TIME: CPT

## 2017-10-02 PROCEDURE — 99233 SBSQ HOSP IP/OBS HIGH 50: CPT | Mod: ,,, | Performed by: INTERNAL MEDICINE

## 2017-10-02 PROCEDURE — 83690 ASSAY OF LIPASE: CPT

## 2017-10-02 PROCEDURE — 97530 THERAPEUTIC ACTIVITIES: CPT

## 2017-10-02 PROCEDURE — 95957 EEG DIGITAL ANALYSIS: CPT

## 2017-10-02 PROCEDURE — 92526 ORAL FUNCTION THERAPY: CPT

## 2017-10-02 PROCEDURE — 82150 ASSAY OF AMYLASE: CPT

## 2017-10-02 PROCEDURE — 84439 ASSAY OF FREE THYROXINE: CPT

## 2017-10-02 PROCEDURE — 63600175 PHARM REV CODE 636 W HCPCS: Performed by: INTERNAL MEDICINE

## 2017-10-02 PROCEDURE — 99233 SBSQ HOSP IP/OBS HIGH 50: CPT | Mod: ,,, | Performed by: ANESTHESIOLOGY

## 2017-10-02 PROCEDURE — 87901 NFCT AGT GNTYP ALYS HIV1 REV: CPT

## 2017-10-02 PROCEDURE — 25000003 PHARM REV CODE 250: Performed by: PSYCHIATRY & NEUROLOGY

## 2017-10-02 PROCEDURE — 83735 ASSAY OF MAGNESIUM: CPT | Mod: 91

## 2017-10-02 PROCEDURE — 83735 ASSAY OF MAGNESIUM: CPT

## 2017-10-02 PROCEDURE — 97116 GAIT TRAINING THERAPY: CPT

## 2017-10-02 PROCEDURE — 80074 ACUTE HEPATITIS PANEL: CPT

## 2017-10-02 PROCEDURE — 95813 EEG EXTND MNTR 61-119 MIN: CPT | Mod: 26,,, | Performed by: PSYCHIATRY & NEUROLOGY

## 2017-10-02 PROCEDURE — 95951 HC EEG MONITORING/VIDEO RECORD: CPT

## 2017-10-02 PROCEDURE — 92523 SPEECH SOUND LANG COMPREHEN: CPT

## 2017-10-02 PROCEDURE — 80069 RENAL FUNCTION PANEL: CPT | Mod: 91

## 2017-10-02 RX ORDER — MAGNESIUM SULFATE HEPTAHYDRATE 40 MG/ML
2 INJECTION, SOLUTION INTRAVENOUS
Status: DISCONTINUED | OUTPATIENT
Start: 2017-10-02 | End: 2017-10-03

## 2017-10-02 RX ORDER — HYDROCODONE BITARTRATE AND ACETAMINOPHEN 500; 5 MG/1; MG/1
TABLET ORAL
Status: DISCONTINUED | OUTPATIENT
Start: 2017-10-02 | End: 2017-10-03

## 2017-10-02 RX ORDER — SODIUM,POTASSIUM PHOSPHATES 280-250MG
2 POWDER IN PACKET (EA) ORAL
Status: DISCONTINUED | OUTPATIENT
Start: 2017-10-02 | End: 2017-10-03

## 2017-10-02 RX ADMIN — CHLORHEXIDINE GLUCONATE 15 ML: 1.2 RINSE ORAL at 08:10

## 2017-10-02 RX ADMIN — SODIUM CHLORIDE: 0.9 INJECTION, SOLUTION INTRAVENOUS at 07:10

## 2017-10-02 RX ADMIN — HYDRALAZINE HYDROCHLORIDE 100 MG: 50 TABLET ORAL at 09:10

## 2017-10-02 RX ADMIN — ATORVASTATIN CALCIUM 10 MG: 10 TABLET, FILM COATED ORAL at 09:10

## 2017-10-02 RX ADMIN — SULFAMETHOXAZOLE AND TRIMETHOPRIM 1 TABLET: 800; 160 TABLET ORAL at 06:10

## 2017-10-02 RX ADMIN — HYDRALAZINE HYDROCHLORIDE 100 MG: 50 TABLET ORAL at 06:10

## 2017-10-02 RX ADMIN — AMPHOTERICIN B 200 MG: 50 INJECTION, POWDER, LYOPHILIZED, FOR SOLUTION INTRAVENOUS at 08:10

## 2017-10-02 RX ADMIN — FAMOTIDINE 20 MG: 20 TABLET, FILM COATED ORAL at 08:10

## 2017-10-02 RX ADMIN — LEVETIRACETAM 500 MG: 5 INJECTION INTRAVENOUS at 09:10

## 2017-10-02 RX ADMIN — AMLODIPINE BESYLATE 5 MG: 5 TABLET ORAL at 08:10

## 2017-10-02 RX ADMIN — HYDRALAZINE HYDROCHLORIDE 100 MG: 50 TABLET ORAL at 01:10

## 2017-10-02 RX ADMIN — LEVETIRACETAM 500 MG: 5 INJECTION INTRAVENOUS at 08:10

## 2017-10-02 NOTE — MEDICAL/APP STUDENT
Progress Note  Infectious Diseases    Patient Name: Volodymyr Pope  YOB: 1965    Admit Date: 9/23/2017                     LOS: 9    SUBJECTIVE:     Reason for Admission:  Status epilepticus  See H&P for detailed presentating history and ROS.      Interval history:     Overnight, no problems. He will receive CRRT today. No cough, fevers/chills, or night sweats. He was alert and oriented. He discussed his HIV treatment regimen in [place], but could not say how long he had been off of the medication.       OBJECTIVE:     Vital Signs Range (Last 24H):  Temp:  [98.2 °F (36.8 °C)-98.7 °F (37.1 °C)]   Pulse:  []   Resp:  [12-40]   BP: (109-178)/()   SpO2:  [94 %-100 %]   Arterial Line BP: (151-189)/() Body mass index is 23.56 kg/m².  Wt Readings from Last 1 Encounters:   09/27/17 0705 78.8 kg (173 lb 11.6 oz)   09/23/17 1957 70.6 kg (155 lb 9.6 oz)       Physical Exam:  General: well developed, well nourished, no distress  Eyes: conjunctivae/corneas clear. PERRL.   Lungs:  clear to auscultation bilaterally and normal respiratory effort  Cardiovascular: Heart: regular rate and rhythm, S1, S2 normal, no murmur, click, rub or gallop.  Abdomen/Rectal: Abdomen: soft, non-tender non-distented; bowel sounds normal; no masses,  no organomegaly.  Neurologic: Normal strength and tone. No focal numbness or weakness  Mental status: alertness: alert, orientation: time, person, place  Psych/Behavioral:  Behavior: normal and Thought content: tangential    Diagnostic Results:  Lab Results   Component Value Date    WBC 2.99 (L) 10/02/2017    HGB 8.5 (L) 10/02/2017    HCT 26.1 (L) 10/02/2017    MCV 90 10/02/2017    PLT 58 (L) 10/02/2017       Recent Labs  Lab 10/02/17  1510   GLU 96      K 4.7      CO2 25   BUN 25*   CREATININE 6.3*   CALCIUM 7.8*   MG 2.0     Viral Testing: Neg for EBV, CMV, HSV, VZV, West Nile, and Crypto in CSF  Positive CMV IgG, HSV, and HHV-g IgG titer 1:320 in blood.  Negative Crypto antigen in blood  HIV RNA 58,053 with 4.76 log units. CD4 count on 9/23 was 50, with 18.3%  Minimally reactive FTA-ABS with negative RPR  Hepatitis panel pending  Urine histo and MAC pending  9/26 AFB gram stain negative, with AFB culture in process  9/27 blood cultures NGTD  9/28 blood cultures NGTD      Imaging    9/27 CT Abdo/Pelvis:   1.  Multiple prominent and mildly enlarged periaortic lymph nodes.  These are of uncertain etiology and could possibly be reactive versus sequela of lymphoproliferative process.  Clinical correlation and further evaluation as warranted.  2.  Diffuse gallbladder wall thickening, which could be reactive secondary to underlying hepatic dysfunction.  If there is clinical concern for acute cholecystitis, further evaluation with ultrasound can be performed.  Mild periportal edema and mild hepatomegaly.  3.  Bibasilar atelectatic versus consolidative change, clinical correlation advised.  Endotracheal tube in place.  4.  Nonspecific small volume of free pelvic fluid.    9/25 MRI Brain with Contrast:  Multifocal gyral diffusion restriction and T2/FLAIR hyperintensity involving the bilateral frontal and parietal lobes (particularly the left frontal eye field) could be related to HIV encephalopathy/HIV associated infection, seizure activity, or multifocal infarcts.  Progressive multifocal leukoencephalopathy can be seen in HIV/AIDS with and can affect gray matter in late stages of the disease, however it usually affects the subcortical U fibers which are mostly spared in this case.  Correlation with CSF analysis is recommended.  T2/FLAIR hyperintensity predominately affecting the periventricular white matter may be related to HIV associated encephalopathy and/or chronic white matter ischemic change.  Negative MRA of the head and neck.    ASSESSMENT/PLAN:     Active Hospital Problems    Diagnosis  POA    *Status epilepticus [G40.901]  Yes    Seizure [R56.9]  Unknown     Chronic pancreatitis [K86.1]  Yes    Acute respiratory failure with hypoxia and hypercapnia [J96.01, J96.02]  Yes    [place] coma scale total score 3-8 [R40.2430]  Yes    Alteration in skin integrity [R23.9]  Yes    Acute encephalopathy [G93.40]  Yes    AIDS [B20]  Yes    Essential hypertension [I10]  Yes    ESRD (end stage renal disease) [N18.6]  Yes    Thrombocytopenia [D69.6]  Yes      Resolved Hospital Problems    Diagnosis Date Resolved POA   No resolved problems to display.       Plan  Continue Azithromycin 1200mg qweekly, Amphotericin 200mg IV q24hrs, and Bactrim 800-160mg MWF  F/u urine histoplasma. May be able to stop amphotericin once urine histo results  F/u MAC cultures. Lymphadenopathy seen on CT may be related to MAC  Hepatitis panel negative. No immunity to Hep A or B. Recommend both vaccinations  Genotype HIV on Monday for potential HAART start due to AMS etiology possibly being HIV encephalopathy based on MRI findings    Tracy Haji  MS4

## 2017-10-02 NOTE — PT/OT/SLP EVAL
"Speech Language Pathology Evaluation    Volodymyr Pope   MRN: [id number]   Admitting Diagnosis: Status epilepticus    Diet recommendations: Solid Diet Level: Puree  Liquid Diet Level: Thin   Aspiration Precautions:  Feed only when awake/alert, No straws, HOB to 90 degrees, Alternating bites/sips, Remain upright 30 minutes post meal, Eliminate distractions and Avoid talking while eating.  Pt may require assistance with oral intake secondary to cognitive status.  Crush oral meds in puree bolus at this time.  Monitor for any s/s of aspiration.       SLP Treatment Date: 10/02/17  Speech Start Time: 1138     Speech Stop Time: 1205     Speech Total (min): 27 min       TREATMENT BILLABLE MINUTES:  Eval 14  and Eval Swallow and Oral Function 13    Diagnosis: Status epilepticus      Past Medical History:   Diagnosis Date    ESRD on hemodialysis     HIV (human immunodeficiency virus infection)     Seizures      Past Surgical History:   Procedure Laterality Date    LUMBAR PUNCTURE  9/26/2017            Has the patient been evaluated by SLP for swallowing? : Yes  Keep patient NPO?: No   General Precautions: Standard, aspiration, fall, pureed diet          Social Hx: Patient lives at home with his wife.    Prior diet: Regular/thin.    Occupational/hobbies/homemaking: Pt works as a .    Subjective:  Pt awake sitting in chair watching tv.   Patient goals: "I want to eat."    Pain/Comfort  Pain Rating 1: 0/10  Pain Rating Post-Intervention 1: 0/10    Objective:   Patient found with: telemetry, NG tube, arterial line, pulse ox (continuous), external ventricular drain    Oral Musculature Evaluation  Oral Musculature: WNL  Dentition: scattered dentition  Secretion Management: oral holding (intermittent )  Mucosal Quality: good  Oral Labial Strength and Mobility: WNL (slight R side drooping)  Volitional Cough: unable to elicit   Volitional Swallow: unable to elicit   Voice Prior to PO Intake: strong and clear      Cognitive " Status:  Behavioral Observations: Pt willingly participated in all therapeutic speech tasks. Pt became impulsive with PO trials due to being hungry/thirsty.  Memory: Pt immediately recalled a 4 number set 3/3, 5 number set 0/2, 4 word set 0/2 increasing to 2/2 with mod verbal cues, and a 5 word set 0/1 with min cues. Groping during immediate recall task noted. Delayed recall task resulted in 1/3 increasing to 3/3 with min verbal cues.   Orientation: Pt oriented x2 to self and place. Pt needed mod verbal cues to orient to time.  Attention: Pt needed occasional verbal cues to attend to therapeutic speech tasks. Perseverations noted across cognitive tasks.  Problem Solving: Pt answered problem solving questions 4/4, compared/contrasted 1/2 .  Pragmatics: Pt demonstrated adequate eye contact and turn taking throughout session.  Executive Function: Pt demonstrated some safety awareness by locating the call button and explaining strategies to call for help when needed. Overall insight into deficits likely reduced.    Language  Auditory Comprehension: Pt answered general questions 3/3, complex yes/no questions 4/4, followed 2 step directions 3/3, and complex directions 2/2    Verbal Expression: Pt labeled functional items in the room 5/5. He completed responsive naming task 3/3.  Pt participated in word fluency task - named 6 items in one minute (15-20 is wnl).  Pt needed occasional redirection of conversation when becoming distracted. Intermittent word finding difficulty present in attempted conversation.    Motor Speech: No significant dysarthria; occasional dysfluencies noted when pt appeared to be experiencing difficulty with word finding.    Voice: WFL     Reading: DNA    Writing: DNA     Visual-Spatial: DNA    Bedside Swallow Eval:  Consistencies Assessed: Ice chips x10, Thin liquids ~6oz, Puree 1 applesauce container and Solids 1 cracker  Oral Phase: Pt demonstrates adequate acceptance, labial seal, bolus control,  and a-p transfer. No oral residue noted across consistencies.   Pharyngeal Phase: Adequate hyolaryngeal elevation noted to palpation. Pt produced one delayed strong volitional cough following one ice chip. Pt had clear vocal quality prior to and following cough. No other overt s/s of aspiration noted across 6oz thin liquid, multiple other ice chips, container of applesauce or cracker trials.     Due to fluctuating cognition, pt impulsivity, and decreased coordination with self po presentations, a puree diet is recommended at this time. Will continue to monitor for appropriateness of diet upgrade.      Additional Treatment:  Education provided to pt regarding ST role, swallow assessment, diet recs, aspiration precautions, speech/language/cognitive assessment and ongoing poc. Whiteboard current with diet recommendations.      FIM:  Social Interaction: 6 Complete [place]--The patient interacts appropriately with staff, other patients, and family members (e.g., controls temper, accepts criticism, is aware that words and actions have an impact on others), and does not require medication for   Problem Solvin Supervision--The patient requires supervision (e.g., cueing or coaxing) to solve less routine problems only under stressful or unfamiliar conditions, but no more than 10% of the time.   Comprehension: 6 Modified [place]--In most situations, the patient understands readily or with only mild dificulty complex or abstract directions and conversation.  The patient does not require prompting, though (s)he may require a hearing or visual aid, other x   Expression: 5 Standby Prompting--The patient expresses basic daily needs and ideas more than 90% of the time.  Requires prompting (e.g., frequent repetition) less than 10% of the time to be understood.   Memory: 3 Moderate Prompting--The patient recognizes and remembers 50 to 74% of the time.     Assessment:  Volodymyr Pope is a 52 y.o. male with a SLP diagnosis of  Cognitive-Linguistic Impairment and dysphagia.          Discharge recommendations: Discharge Facility/Level Of Care Needs: nursing facility, skilled     Goals:    SLP Goals        Problem: SLP Goal    Goal Priority Disciplines Outcome   SLP Goal     SLP    Description:  Speech Language Pathology Goals  Goals expected to be met by 10/9     1. Pt will tolerate diet of thin liquids and purees without overt s/s of aspiration   2. Pt will participate in ongoing assessment of swallow function to help determine least restrictive diet   3. Pt will orient x4 with min cues.  4. Pt will complete recall tasks with 70% accuracy given min-mod cues to increase comprehension.  5. Pt will complete moderate level problem solving tasks with 80% accuracy given min cues to enhance independence with ADLs.  6.  Pt will complete higher level word finding tasks with 90% accuracy, to improve expressive language skills.   7. Pt will participate in reading/writing/visual-spatial eval to rule out further deficits.                     Plan:   Patient to be seen Therapy Frequency: 5 x/week   Plan of Care expires: 10/29/17  Plan of Care reviewed with: patient  SLP Follow-up?: Yes              DAMIAN Easley  10/02/2017      I certify that I was present in the room directing the student in service delivery and guiding them using my skilled judgment. As the co-signing therapist I have reviewed the students documentation and am responsible for the treatment, assessment, and plan.     MIR Amador, CCC-SLP  Speech Language Pathologist  [phone]  10/2/2017

## 2017-10-02 NOTE — PLAN OF CARE
SUZE met with Pt at bedside to discuss rehab recs. He reported he went to rehab at [facility] in [place] and wants to go there. He signed Pt choice form and SW placed it in the chart.    SUZE spoke with Pt POA regarding the rehab list and choice. She reported [facility] would be fine. Discussed that the transportation would be at cost for the Pt as insurance does not cover this. Gave transportation information so she can obtain quotes.    SUZE advised by CM that the Pt mother is at [facility] asking to speak with us. CM and SUZE met with mother to discuss rehab and Pt choice for rehab. She reported she is working on POA.     SUZE sent email to [facility] to complete the referral.    Sridevi Spears, VERONICA  Neurocritical Care   [facility]  [phone]

## 2017-10-02 NOTE — ASSESSMENT & PLAN NOTE
-Patient is ESRD on HD since May 2017? Underlying etiology uncertain, ?HIV nephropathy, history of non-compliance with HD treatments.    -Last had SLED on Saturday, tolerated well.  Electrolytes stable this morning, oxygenating well on RA.  -will provide 12 hour SLED for metabolic clearance/volume management.  --400 cc/hr as tolerated.

## 2017-10-02 NOTE — ASSESSMENT & PLAN NOTE
Case of 51 y/o male with PMHX of HIV currently AIDS not treated at this time with no follow up in out patient to our knowledge. CD4 count is 42.      -Bactrim for PCP prophylaxis.  Monitor potassium.  -Azithromycin 1200 mg once a week for MAC prophylaxis  -Genotype pending.  Will plan to start HAART afterwards  -hepatitis negative

## 2017-10-02 NOTE — PROCEDURES
DATE OF SERVICE:  09/30/2017    DURATION OF STUDY:  23 hours and 50 minutes.    ICU EEG AND VIDEO MONITORING REPORT    METHODOLOGY:  Electroencephalographic (EEG) is recorded with electrodes placed   according to the International 10-20 placement system.  Thirty two (32) channels   of digital signal (sampling rate of 512/sec), including T1 and T2, were   simultaneously recorded from the scalp and may include EKG, EMG, and/or eye   monitors.  Recording band pass was 0.1 to 512 Hz.  Digital video recording of   the patient is simultaneously recorded with the EEG.  The patient is instructed   to report clinical symptoms which may occur during the recording session.  EEG   and video recording are stored and archived in digital format.  Activation   procedures, which include photic stimulation, hyperventilation and instructing   patients to perform simple tasks, are done in selected patients.    The EEG is displayed on a monitor screen and can be reviewed using different   montages.  Computer-assisted analysis is employed to detect spike and   electrographic seizure activity.  The entire record is submitted for computer   analysis.  The entire recording is visually reviewed, and the times identified   by computer analysis as being spikes or seizures are reviewed again.    Compressed spectral analysis (CSA) is also performed on the activity recorded   from each individual channel.  This is displayed as a power display of   frequencies from 0 to 30 Hz over time.  The CSA is reviewed looking for   asymmetries in power between homologous areas of the scalp, then compared with   the original EEG recording.    [employer] software was also utilized in the review of this study.  This software   suite analyzes the EEG recording in multiple domains.  Coherence and rhythmicity   are computed to identify EEG sections which may contain organized seizures.    Each channel undergoes analysis to detect the presence of spike and sharp waves    which have special and morphological characteristics of epileptic activity.  The   routine EEG recording is converted from special into frequency domain.  This is   then displayed comparing homologous areas to identify areas of significant   asymmetry.  Algorithm to identify non-cortically generated artifact is used to   separate artifact from the EEG.    RECORDING TIMES:  Start on 09/29/2017, at 07:00.  Stop on 09/30/2017, at 07:00.  A total of 23 hours and 50 minutes of EEG were recorded.    EEG FINGINGS:  The background of this record contains predominantly low to   medium amplitude delta activity initially.  There is a significant amount of EMG   and muscle artifact, which mars large portions of the record.  There are faster   frequencies seen in the theta and alpha band.  During times in which the   patient is quiescent, predominantly low amplitude delta with some mixed theta   activity is seen.    The record remains symmetric.  There are no epileptiform discharges.  There are   no focal findings seen at any point.  There are no clinical events captured.    There are no electrographic seizures.  The technologist comments that the   patient continually bites on the endotracheal tube, which is definitely   contributing to EMG artifact on this study, but there are no epileptiform   correlates to any of this.  There is little change from the beginning to the end   of this record.    INTERPRETATION:  Abnormal EEG due to fluctuating moderate encephalopathy as   described above.  There is a significant amount of muscle artifact, particularly   from the patient's jaw clenching, which mars large portions of this record, but   there is no evidence of epilepsy or focal cerebral dysfunction.      NBB/IN  dd: 09/30/2017 10:22:22 (CDT)  td: 09/30/2017 15:52:46 (CDT)  Doc ID   #[id number]  Job ID #[id number]    CC:

## 2017-10-02 NOTE — PLAN OF CARE
Problem: Physical Therapy Goal  Goal: Physical Therapy Goal  Goals to be met by: 10/10/2017     Patient will increase functional independence with mobility by performin. Supine to sit with MInimal Assistance  2. Sit to supine with MInimal Assistance  3. Sit to stand transfer with Maximum Assistance-met   Revised: Sit to stand transfer with Min A  4. Bed to chair transfer with Maximum Assistance  5. Gait  x 10 feet with Maximum Assistance using appropriate AD  6. Lower extremity exercise program x15 reps per handout, with assistance as needed     Outcome: Ongoing (interventions implemented as appropriate)  Pt progressing towards goals.     CONSUELO HOLCOMB, PT  ,10/2/2017

## 2017-10-02 NOTE — SUBJECTIVE & OBJECTIVE
Interval History:   NAEON.  Last had SLED on Saturday, tolerated well.  Net positive 200 ml/24h, electrolytes stable today.  Oxygenating well on RA.      Review of patient's allergies indicates:  No Known Allergies  Current Facility-Administered Medications   Medication Frequency    0.9%  NaCl infusion (CRRT USE ONLY) PRN    0.9%  NaCl infusion (for blood administration) Q24H PRN    0.9%  NaCl infusion (for blood administration) Q24H PRN    0.9%  NaCl infusion (for blood administration) Q24H PRN    acetaminophen oral solution 650 mg Q6H PRN    acetaminophen tablet 500 mg Daily PRN    amlodipine tablet 5 mg Daily    amphotericin B liposome (AMBISOME) 200 mg in dextrose 5 % 200 mL IVPB Q24H    atorvastatin tablet 10 mg QHS    azithromycin tablet 1,200 mg Weekly    hydrALAZINE injection 10 mg Q4H PRN    hydrALAZINE tablet 100 mg Q8H    labetalol injection 10 mg Q4H PRN    levetiracetam in NaCl (iso-os) IVPB 500 mg Q12H    magnesium sulfate 2g in water 50mL IVPB (premix) PRN    magnesium sulfate 2g in water 50mL IVPB (premix) PRN    magnesium sulfate 2g in water 50mL IVPB (premix) PRN    potassium, sodium phosphates 280-160-250 mg packet 2 packet PRN    potassium, sodium phosphates 280-160-250 mg packet 2 packet PRN    potassium, sodium phosphates 280-160-250 mg packet 2 packet PRN    sulfamethoxazole-trimethoprim 800-160mg per tablet 1 tablet Every Mon, Wed, Fri       Objective:     Vital Signs (Most Recent):  Temp: 98.7 °F (37.1 °C) (10/02/17 0700)  Pulse: 104 (10/02/17 1100)  Resp: 20 (10/02/17 1100)  BP: (!) 148/93 (10/02/17 1100)  SpO2: 100 % (10/02/17 1100)  O2 Device (Oxygen Therapy): room air (10/02/17 0700) Vital Signs (24h Range):  Temp:  [98.2 °F (36.8 °C)-98.7 °F (37.1 °C)] 98.7 °F (37.1 °C)  Pulse:  [] 104  Resp:  [12-40] 20  SpO2:  [94 %-100 %] 100 %  BP: (133-178)/() 148/93  Arterial Line BP: (151-189)/() 189/109     Weight: 78.8 kg (173 lb 11.6 oz) (09/27/17  0705)  Body mass index is 23.56 kg/m².  Body surface area is 2 meters squared.    I/O last 3 completed shifts:  In: 2010 [I.V.:1810; IV Piggyback:200]  Out: 2620 [Other:2620]    Physical Exam   Constitutional: He is oriented to person, place, and time. He appears well-developed and well-nourished.   HENT:   Head: Normocephalic and atraumatic.   Eyes: Pupils are equal, round, and reactive to light.   Neck: Neck supple.   Cardiovascular: Normal rate, regular rhythm and normal heart sounds.  Exam reveals no friction rub.    No murmur heard.  Pulmonary/Chest: Effort normal and breath sounds normal. He has no wheezes. He has no rales.   Abdominal: Soft. Bowel sounds are normal. He exhibits no distension. There is no tenderness. There is no guarding.   Musculoskeletal: He exhibits edema (bilateral hands). He exhibits no deformity.   Neurological: He is alert and oriented to person, place, and time.   Skin: Skin is warm and dry. No rash noted. No erythema.       Significant Labs:  CBC:   Recent Labs  Lab 10/02/17  0247   WBC 2.99*   RBC 2.89*   HGB 8.5*   HCT 26.1*   PLT 58*   MCV 90   MCH 29.4   MCHC 32.6     CMP:   Recent Labs  Lab 09/25/17  1514  10/02/17  0247   GLU 91  < > 74  74   CALCIUM 7.8*  < > 7.7*  7.7*   ALBUMIN 2.0*  < > 1.6*  1.6*   PROT 7.7  --   --      < > 141  141   K 4.8  < > 4.7  4.7   CO2 23  < > 24  24   CL 98  < > 109  109   BUN 53*  < > 17  17   CREATININE 13.3*  < > 4.8*  4.8*   ALKPHOS 112  --   --    ALT 49*  --   --    AST 87*  --   --    BILITOT 0.5  --   --    < > = values in this interval not displayed.

## 2017-10-02 NOTE — PLAN OF CARE
Problem: Patient Care Overview  Goal: Plan of Care Review  Outcome: Ongoing (interventions implemented as appropriate)  POC reviewed with Volodymyr Pope and mother at 1700. Pt verbalized understanding. Questions and concerns addressed. No acute events today. Pt progressing toward goals. Will continue to monitor. See flowsheets for full assessment and VS info.

## 2017-10-02 NOTE — PROCEDURES
DATE OF SERVICE:  09/29/2017    ICU EEG/VIDEO MONITORING REPORT     METHODOLOGY:  Electroencephalographic (EEG) is recorded with electrodes placed   according to the International 10-20 placement system.  Thirty two (32) channels   of digital signal (sampling rate of 512/sec), including T1 and T2, were   simultaneously recorded from the scalp and may include EKG, EMG, and/or eye   monitors.  Recording band pass was 0.1 to 512 Hz.  Digital video recording of   the patient is simultaneously recorded with the EEG.  The patient is instructed   to report clinical symptoms which may occur during the recording session.  EEG   and video recording are stored and archived in digital format.  Activation   procedures, which include photic stimulation, hyperventilation and instructing   patients to perform simple tasks, are done in selected patients.    The EEG is displayed on a monitor screen and can be reviewed using different   montages.  Computer-assisted analysis is employed to detect spike and   electrographic seizure activity.   The entire record is submitted for computer   analysis.  The entire recording is visually reviewed, and the times identified   by computer analysis as being spikes or seizures are reviewed again.    Compressed spectral analysis (CSA) is also performed on the activity recorded   from each individual channel.  This is displayed as a power display of   frequencies from 0 to 30 Hz over time.   The CSA is reviewed looking for   asymmetries in power between homologous areas of the scalp, then compared with   the original EEG recording.    [employer] software was also utilized in the review of this study.  This software   suite analyzes the EEG recording in multiple domains.  Coherence and rhythmicity   are computed to identify EEG sections which may contain organized seizures.    Each channel undergoes analysis to detect the presence of spike and sharp waves   which have special and morphological  characteristics of epileptic activity.  The   routine EEG recording is converted from special into frequency domain.  This is   then displayed comparing homologous areas to identify areas of significant   asymmetry.  Algorithm to identify non-cortically generated artifact is used to   separate artifact from the EEG.    RECORDING TIMES:  This study of duration is 13 hours and 23 minutes.  It begins   09/28/2017 at 17:29 and ends 09/29/2017 at 07:00.    EEG FINDINGS:  The background of this study contains predominantly low to medium   amplitude delta activity with some superimposed beta frequencies seen centrally   and frontally predominantly.  At times, delta activity becomes semi-rhythmic,   but no sharp activity seen.  No epileptiform discharges are seen.  No major   change or evolution is seen in the record for the entire duration.  At times,   some slightly faster delta activity in the 4 Hz range is seen and when the   patient is somewhat more he will alert, more EMG artifact is seen overlying.    In the overnight hours there are portions of the record during which a   rudimentary sleep pattern is seen with central poorly formed spindle activity   and diffuse delta seen peripherally, but this only lasts for approximately 60   minutes before resumption of previous pattern.    There are several push-button events noted in the record, which corresponds on   video with eye movements and head movements and gagging movements that are seen.    These did not correlate with any EEG changes and do not appear to be epileptic   in nature by semiology either.  These events take place at 19:24, 19:42, 20:05,   21:00, 21-33, 22:16 and 00:15.  The remainder of the record shows no   epileptiform discharges and no electrographic seizure activity.    INTERPRETATION:  Abnormal EEG due to moderately severe diffuse encephalopathy   without epileptiform features or focal features.  The marked involuntary   movements noted in the video  did not appear epileptiform in nature.      NBB/IN  dd: 09/29/2017 14:40:17 (CDT)  td: 09/29/2017 22:38:36 (CDT)  Doc ID   #[id number]  Job ID #[id number]    CC:

## 2017-10-02 NOTE — ASSESSMENT & PLAN NOTE
53 y/o with AIDS admitted with encephalopathy.  Mental status is still poor but appears to be a bit better.  MRI imaging revealed abnormalities.  Differential includes HIV encephalopathy.  LEAH virus also possible.  Therapy for both of these is to improve the immune system with HAART.  Awaiting results of serum histo antigen.  Will likely start HAART after results return.    - HIV genotype pending  - follow histoplasma results

## 2017-10-02 NOTE — PT/OT/SLP PROGRESS
Physical Therapy  Treatment    Volodymyr Pope   MRN: [id number]   Admitting Diagnosis: Status epilepticus    PT Received On: 10/02/17  PT Start Time: 1102     PT Stop Time: 1129    PT Total Time (min): 27 min       Billable Minutes:  Gait [id number] and Therapeutic Activity 17    Treatment Type: Treatment  PT/PTA: PT     PTA Visit Number: 0       General Precautions: Standard, aspiration, fall  Orthopedic Precautions: N/A   Braces: N/A         Subjective:  Communicated with RN prior to session.  Pt agreeable to therapy session.     Pain/Comfort  Pain Rating 1: 0/10  Pain Rating Post-Intervention 1: 0/10    Objective:   Patient found with: telemetry, blood pressure cuff, pulse ox (continuous) (EEG)    Functional Mobility:  Bed Mobility:   Supine to Sit: Maximum Assistance    Transfers:  Sit <> Stand Assistance: Maximum Assistance (x2 trials)  Bed <> Chair Technique: Stand Pivot  Bed <> Chair Assistance: Total Assistance  Bed <> Chair Assistive Device: No Assistive Device    Gait:   Gait Distance: 1 side step EOB, assist with B LE advancement and prevention on B knee buckle  Assistance 1: Total assistance (assist x2)  Gait Assistive Device: No device  Gait Pattern: reciprocal  Gait Deviation(s): increased time in double stance, decreased swing-to-stance ratio, decreased toe-to-floor clearance    Balance:   Static Sit: FAIR+: Able to take MINIMAL challenges from all directions  Dynamic Sit: FAIR: Cannot move trunk without losing balance  Static Stand: 0: Needs MAXIMAL assist to maintain   Dynamic stand: 0: N/A     Therapeutic Activities and Exercises:  Pt educated on role of PT/POC.  Pt able to report PLOF:  -pt reports living alone in 1-story house without SHAKEEL  -pt reports (I) with ADLs and amb and working in safety/compliance   Pt stood EOB with mod A with post lean for ~30sec.   Pt safe to perform transfers with RN staff.     AM-PAC 6 CLICK MOBILITY  How much help from another person does this patient currently need?   1  = Unable, Total/Dependent Assistance  2 = A lot, Maximum/Moderate Assistance  3 = A little, Minimum/Contact Guard/Supervision  4 = None, Modified [place]/Independent    Turning over in bed (including adjusting bedclothes, sheets and blankets)?: 2  Sitting down on and standing up from a chair with arms (e.g., wheelchair, bedside commode, etc.): 2  Moving from lying on back to sitting on the side of the bed?: 2  Moving to and from a bed to a chair (including a wheelchair)?: 1  Need to walk in hospital room?: 1  Climbing 3-5 steps with a railing?: 1  Total Score: 9    AM-PAC Raw Score CMS G-Code Modifier Level of Impairment Assistance   6 % Total / Unable   7 - 9 CM 80 - 100% Maximal Assist   10 - 14 CL 60 - 80% Moderate Assist   15 - 19 CK 40 - 60% Moderate Assist   20 - 22 CJ 20 - 40% Minimal Assist   23 CI 1-20% SBA / CGA   24 CH 0% Independent/ Mod I     Patient left up in chair with all lines intact, call button in reach and RN notified.    Assessment:  Volodymyr Pope is a 52 y.o. male with a medical diagnosis of Status epilepticus and presents with decreased strength, endurance, balance, safety awareness and overall functional mobility. Pt performed bed mobility max A and transfers max/total A. Pt took 1 side step EOB, total A (assist x2) with B LE advancement and prevention on B knee buckle. Pt will continue to benefit from skilled PT to improve deficits and increase overall functional mobility. Pt will benefit from IP Rehab to return to PLOF and gain more functional (I).     Rehab identified problem list/impairments: Rehab identified problem list/impairments: weakness, impaired functional mobilty, impaired cognition, decreased safety awareness, decreased coordination, gait instability, impaired endurance, impaired balance, decreased lower extremity function    Rehab potential is good.    Activity tolerance: Good    Discharge recommendations: Discharge Facility/Level Of Care Needs: rehabilitation  facility     Barriers to discharge: Barriers to Discharge: Decreased caregiver support (pt lives alone)    Equipment recommendations: Equipment Needed After Discharge: other (see comments) (TBD)     GOALS:    Physical Therapy Goals        Problem: Physical Therapy Goal    Goal Priority Disciplines Outcome Goal Variances Interventions   Physical Therapy Goal     PT/OT, PT Ongoing (interventions implemented as appropriate)     Description:  Goals to be met by: 10/10/2017     Patient will increase functional independence with mobility by performin. Supine to sit with MInimal Assistance  2. Sit to supine with MInimal Assistance  3. Sit to stand transfer with Maximum Assistance-met   Revised: Sit to stand transfer with Min A  4. Bed to chair transfer with Maximum Assistance  5. Gait  x 10 feet with Maximum Assistance using appropriate AD  6. Lower extremity exercise program x15 reps per handout, with assistance as needed                       PLAN:    Patient to be seen 5 x/week  to address the above listed problems via gait training, therapeutic activities, therapeutic exercises, neuromuscular re-education  Plan of Care expires: 10/30/17  Plan of Care reviewed with: patient         CONSUELO ZHANG, PT  10/02/2017

## 2017-10-02 NOTE — PLAN OF CARE
Problem: Patient Care Overview  Goal: Plan of Care Review  Outcome: Ongoing (interventions implemented as appropriate)  POC reviewed with pt at 0449. Pt verbalized understanding but cannot provide teachback. Questions and concerns addressed with pt and his mother. No acute events overnight. Pt progressing toward goals. Will continue to monitor. See flowsheets for full assessment and VS info

## 2017-10-02 NOTE — PROGRESS NOTES
[facility]  Nephrology  Progress Note    Patient Name: Volodymyr Pope  MRN: [id number]  Admission Date: 9/23/2017  Hospital Length of Stay: 9 days  Attending Provider: Julio Granger MD   Primary Care Physician: Bulmaro Flores  Principal Problem:Status epilepticus    Subjective:     HPI: 51 yearold male with AIDS (CD 49 not on ART or PPx), ESRD on HD (M,W,F), and seizure disorder presents as a transfer from [facility] for Neurology eval and LP.  Patient presented to OSH on 9/15 from a dialysis center for HTN.  He was admitted for HTN urgency and routine workup revealed thrombocytopenia of 19,000.  His hospital course was complicated by AMS with lethargy.  MRI revealed multiple areas concerning for embolic strokes with seziure activity.  Neurology initially felt his AMS and resulting seizure was 2/2 to stroke and started him on Keppra, ASA and stain.  His mental status initially improved however he was found to be HTN with a fever and spent a brief time in the ICU.  Patient was started on ceftriaxone and Vanc given concern for UTI.  After stepping down to the floor patient was once again found to be lethargic and confused.  Neurology wished to pursue LP but due to platelet count of 52,000 they felt uncomfortable due to lack of Neurosurgery and this is the reason for transfer to [facility].  Other PMH significant for poorly controlled HTN and HIV/?AIDS with uncertain compliance.  Patient reportedly initially was Aox4, but when I see him he is not alert, not answering questions, arousable, but barely.     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    Prior to hospital admit in [place] he was receiving HD at [facility] in [place] on MWF through a R sided tunneled access line (reportedly w/o access problems and access placed in May), his nephrologist in [place] is Dr Hammond, treatment duration 4hrs, EDW: 78 kg, Normal UF 1-2 L, he does have residual renal function, uncertain how much, although noted he has made about 600ccs of urine overnight.    He reportedly received dialysis this past Friday, his lytes, acid/base and volume status are stable this morning, he is actually net negative 400ccs overnight, and appears to be below his EDW.      Interval History:   NAEON.  Last had SLED on Saturday, tolerated well.  Net positive 200 ml/24h, electrolytes stable today.  Oxygenating well on RA.      Review of patient's allergies indicates:  No Known Allergies  Current Facility-Administered Medications   Medication Frequency    0.9%  NaCl infusion (CRRT USE ONLY) PRN    0.9%  NaCl infusion (for blood administration) Q24H PRN    0.9%  NaCl infusion (for blood administration) Q24H PRN    0.9%  NaCl infusion (for blood administration) Q24H PRN    acetaminophen oral solution 650 mg Q6H PRN    acetaminophen tablet 500 mg Daily PRN    amlodipine tablet 5 mg Daily    amphotericin B liposome (AMBISOME) 200 mg in dextrose 5 % 200 mL IVPB Q24H    atorvastatin tablet 10 mg QHS    azithromycin tablet 1,200 mg Weekly    hydrALAZINE injection 10 mg Q4H PRN    hydrALAZINE tablet 100 mg Q8H    labetalol injection 10 mg Q4H PRN    levetiracetam in NaCl (iso-os) IVPB 500 mg Q12H    magnesium sulfate 2g in water 50mL IVPB (premix) PRN    magnesium sulfate 2g in water 50mL IVPB (premix) PRN    magnesium sulfate 2g in water 50mL IVPB (premix) PRN    potassium, sodium phosphates 280-160-250 mg packet 2 packet PRN    potassium, sodium phosphates 280-160-250 mg packet 2 packet PRN    potassium, sodium phosphates 280-160-250 mg packet 2 packet PRN     sulfamethoxazole-trimethoprim 800-160mg per tablet 1 tablet Every Mon, Wed, Fri       Objective:     Vital Signs (Most Recent):  Temp: 98.7 °F (37.1 °C) (10/02/17 0700)  Pulse: 104 (10/02/17 1100)  Resp: 20 (10/02/17 1100)  BP: (!) 148/93 (10/02/17 1100)  SpO2: 100 % (10/02/17 1100)  O2 Device (Oxygen Therapy): room air (10/02/17 0700) Vital Signs (24h Range):  Temp:  [98.2 °F (36.8 °C)-98.7 °F (37.1 °C)] 98.7 °F (37.1 °C)  Pulse:  [] 104  Resp:  [12-40] 20  SpO2:  [94 %-100 %] 100 %  BP: (133-178)/() 148/93  Arterial Line BP: (151-189)/() 189/109     Weight: 78.8 kg (173 lb 11.6 oz) (09/27/17 0705)  Body mass index is 23.56 kg/m².  Body surface area is 2 meters squared.    I/O last 3 completed shifts:  In: 2010 [I.V.:1810; IV Piggyback:200]  Out: 2620 [Other:2620]    Physical Exam   Constitutional: He is oriented to person, place, and time. He appears well-developed and well-nourished.   HENT:   Head: Normocephalic and atraumatic.   Eyes: Pupils are equal, round, and reactive to light.   Neck: Neck supple.   Cardiovascular: Normal rate, regular rhythm and normal heart sounds.  Exam reveals no friction rub.    No murmur heard.  Pulmonary/Chest: Effort normal and breath sounds normal. He has no wheezes. He has no rales.   Abdominal: Soft. Bowel sounds are normal. He exhibits no distension. There is no tenderness. There is no guarding.   Musculoskeletal: He exhibits edema (bilateral hands). He exhibits no deformity.   Neurological: He is alert and oriented to person, place, and time.   Skin: Skin is warm and dry. No rash noted. No erythema.       Significant Labs:  CBC:   Recent Labs  Lab 10/02/17  0247   WBC 2.99*   RBC 2.89*   HGB 8.5*   HCT 26.1*   PLT 58*   MCV 90   MCH 29.4   MCHC 32.6     CMP:   Recent Labs  Lab 09/25/17  1514  10/02/17  0247   GLU 91  < > 74  74   CALCIUM 7.8*  < > 7.7*  7.7*   ALBUMIN 2.0*  < > 1.6*  1.6*   PROT 7.7  --   --      < > 141  141   K 4.8  < > 4.7  4.7    CO2 23  < > 24  24   CL 98  < > 109  109   BUN 53*  < > 17  17   CREATININE 13.3*  < > 4.8*  4.8*   ALKPHOS 112  --   --    ALT 49*  --   --    AST 87*  --   --    BILITOT 0.5  --   --    < > = values in this interval not displayed.         Assessment/Plan:     ESRD (end stage renal disease)    -Patient is ESRD on HD since May 2017? Underlying etiology uncertain, ?HIV nephropathy, history of non-compliance with HD treatments.    -Last had SLED on Saturday, tolerated well.  Electrolytes stable this morning, oxygenating well on RA.  -will provide 12 hour SLED for metabolic clearance/volume management.  --400 cc/hr as tolerated.          Andrews Garcia NP  Nephrology  [facility]  Pager:  [phone]

## 2017-10-02 NOTE — PLAN OF CARE
Problem: Patient Care Overview  Goal: Plan of Care Review  Outcome: Ongoing (interventions implemented as appropriate)  POC reviewed with pt and family at 1600. Pt and mother verbalized understanding. Questions and concerns addressed. No acute events today. Pt progressing toward goals. Will continue to monitor. See flowsheets for full assessment and VS info.     A-line DC this Am. Patient up to chair today for several hours with no issues. PT/OT/ST consulted with patient. Now on mechanical soft diet.

## 2017-10-02 NOTE — PLAN OF CARE
10/02/17 1720   Discharge Reassessment   Assessment Type Discharge Planning Reassessment   Provided patient/caregiver education on the expected discharge date and the discharge plan Yes   Do you have any problems affording any of your prescribed medications? No   Discharge Plan A Rehab   Discharge Plan B Home with family;Home Health   Can the patient answer the patient profile reliably? Yes, cognitively intact   Describe the patient's ability to walk at the present time. Major restrictions/daily assistance from another person   How often would a person be available to care for the patient? Often   Number of comorbid conditions (as recorded on the chart) Two       Patient not medically stable for discharge.  SW provided list for rehab choices.     Kelli Goode RN, CCRN-K, [facility]  Neuro-Critical Care   [phone]

## 2017-10-02 NOTE — SUBJECTIVE & OBJECTIVE
Interval History: Not oriented to place or time.    Review of Systems   Constitutional: Negative for activity change, chills, diaphoresis, fatigue and fever.   Respiratory: Negative for cough and shortness of breath.    Gastrointestinal: Negative for abdominal distention, abdominal pain, diarrhea, nausea and vomiting.   Psychiatric/Behavioral: Positive for confusion.     Objective:     Vital Signs (Most Recent):  Temp: 98.7 °F (37.1 °C) (10/02/17 0700)  Pulse: 108 (10/02/17 1300)  Resp: (!) 21 (10/02/17 1300)  BP: 112/80 (10/02/17 1300)  SpO2: 97 % (10/02/17 1300) Vital Signs (24h Range):  Temp:  [98.2 °F (36.8 °C)-98.7 °F (37.1 °C)] 98.7 °F (37.1 °C)  Pulse:  [] 108  Resp:  [12-40] 21  SpO2:  [94 %-100 %] 97 %  BP: (112-178)/() 112/80  Arterial Line BP: (151-189)/() 189/109     Weight: 78.8 kg (173 lb 11.6 oz)  Body mass index is 23.56 kg/m².    Estimated Creatinine Clearance: 19.5 mL/min (based on SCr of 4.8 mg/dL (H)).    Physical Exam   Constitutional: He appears well-developed and well-nourished.   Eyes: EOM are normal. Pupils are equal, round, and reactive to light.   Neck: Normal range of motion.   Cardiovascular: Normal rate, regular rhythm and normal heart sounds.    Pulmonary/Chest: Effort normal and breath sounds normal.   Abdominal: Soft. Bowel sounds are normal. He exhibits no distension. There is no tenderness. There is no rebound.   Neurological: He is alert.   Skin: No rash noted.       Significant Labs:   Blood Culture:     Recent Labs  Lab 09/23/17 2122 09/24/17  0013 09/27/17  0617 09/27/17  0625 09/28/17  1030   LABBLOO No growth after 5 days. No growth after 5 days. No growth after 5 days. No growth after 5 days. No Growth to date  No Growth to date  No Growth to date  No Growth to date  No Growth to date     BMP:     Recent Labs  Lab 10/02/17  0247 10/02/17  0829   GLU 74  74  --      141  --    K 4.7  4.7  --      109  --    CO2 24  24  --    BUN 17  17   --    CREATININE 4.8*  4.8*  --    CALCIUM 7.7*  7.7*  --    MG  --  1.8     CBC:     Recent Labs  Lab 10/01/17  0147 10/02/17  0247   WBC 3.00* 2.99*   HGB 9.1* 8.5*   HCT 27.4* 26.1*   PLT 62* 58*     Microbiology Results (last 7 days)     Procedure Component Value Units Date/Time    Blood culture [[id number]] Collected:  09/28/17 1030    Order Status:  Completed Specimen:  Blood from Line, Subclavian, Right Updated:  10/02/17 1412     Blood Culture, Routine No Growth to date     Blood Culture, Routine No Growth to date     Blood Culture, Routine No Growth to date     Blood Culture, Routine No Growth to date     Blood Culture, Routine No Growth to date    Narrative:       Please draw from R tunneled catheter    Fungus Culture, Blood or Bone Marrow [[id number]] Collected:  09/26/17 0611    Order Status:  Completed Specimen:  Blood from Arm, Left Updated:  10/02/17 1406     Fungus Cult, blood or BM Culture in progress    Blood culture [[id number]] Collected:  09/27/17 0617    Order Status:  Completed Specimen:  Blood from Peripheral, Antecubital, Right Updated:  10/02/17 1012     Blood Culture, Routine No growth after 5 days.    Narrative:       Blood cultures from 2 different sites. 4 bottles total.  Please draw before starting antibiotics.    Blood culture [[id number]] Collected:  09/27/17 0625    Order Status:  Completed Specimen:  Blood from Peripheral, Foot, Left Updated:  10/02/17 1012     Blood Culture, Routine No growth after 5 days.    Narrative:       Blood cultures x 2 different sites. 4 bottles total. Please  draw cultures before administering antibiotics.    CSF culture [[id number]] Collected:  09/26/17 1442    Order Status:  Completed Specimen:  CSF (Spinal Fluid) from CSF Tap, Tube 3 Updated:  10/01/17 0708     CSF CULTURE No Growth     Gram Stain Result Cytospin indicates:      No WBC's      No organisms seen    Culture, Respiratory with Gram Stain [[id number]] Collected:  09/27/17 0709    Order Status:   Completed Specimen:  Respiratory from Endotracheal Aspirate Updated:  09/29/17 1110     Respiratory Culture Normal respiratory brittany     Gram Stain (Respiratory) <10 epithelial cells per low power field.     Gram Stain (Respiratory) Many WBC's     Gram Stain (Respiratory) No organisms seen    Narrative:       Mini-BAL.    Blood culture [[id number]] Collected:  09/24/17 0013    Order Status:  Completed Specimen:  Blood Updated:  09/29/17 0612     Blood Culture, Routine No growth after 5 days.    Narrative:       OBTAIN from Central line please    Blood culture [[id number]] Collected:  09/23/17 2122    Order Status:  Completed Specimen:  Blood Updated:  09/28/17 2312     Blood Culture, Routine No growth after 5 days.    Narrative:       Collection has been rescheduled by RNS at 9/23/2017 20:40 Reason: dr. garza in.  Collection has been rescheduled by RNS at 9/23/2017 20:48 Reason: dr.   still with pt.  Collection has been rescheduled by RNS at 9/23/2017 20:40 Reason: dr. garza in.  Collection has been rescheduled by RNS at 9/23/2017 20:48 Reason: dr.   still with pt.    Blood culture [[id number]] Collected:  09/23/17 2039    Order Status:  Completed Specimen:  Blood Updated:  09/28/17 2312     Blood Culture, Routine No growth after 5 days.    Blood culture [[id number]]     Order Status:  Canceled Specimen:  Blood     Blood culture [[id number]]     Order Status:  Canceled Specimen:  Blood     Culture, Respiratory with Gram Stain [[id number]]     Order Status:  Canceled Specimen:  Respiratory     AFB Culture & Smear [[id number]] Collected:  09/26/17 1442    Order Status:  Completed Specimen:  CSF (Spinal Fluid) from CSF Tap, Tube 3 Updated:  09/27/17 2127     AFB Culture & Smear Culture in progress     AFB CULTURE STAIN No acid fast bacilli seen.    Cryptococcal antigen, CSF [[id number]] Collected:  09/26/17 1442    Order Status:  Completed Specimen:  CSF (Spinal Fluid) from CSF Tap, Tube 3 Updated:  09/27/17 1009     Crypto  Ag, CSF Negative    AFB Culture & Smear [[id number]] Collected:  09/26/17 0611    Order Status:  Completed Specimen:  Blood from Blood Updated:  09/27/17 0927     AFB Culture & Smear Culture in progress    Gram stain [[id number]] Collected:  09/26/17 1442    Order Status:  Canceled Specimen:  CSF (Spinal Fluid) from CSF Tap, Tube 3 Updated:  09/26/17 1616    Fungus culture [[id number]] Collected:  09/26/17 0611    Order Status:  Canceled Specimen:  Blood from Arm, Left Updated:  09/26/17 0627          Significant Imaging: I have reviewed all pertinent imaging results/findings within the past 24 hours.
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
## 2017-10-02 NOTE — PROGRESS NOTES
ICU Progress Note  Neurocritical Care    Admit Date: 9/23/2017  LOS: 9    CC: Status epilepticus    Code Status: Full Code     SUBJECTIVE:     Interval History/Significant Events:   Awake  ON EEG  Adult ftt  esrd  Elect. abnl  Aspiration risk  Anemia sec to dilution  Magi/lipase still high      Medications:  Continuous Infusions:   Scheduled Meds:   amlodipine  5 mg Per NG tube Daily    amphotericin B liposome (AMBISOME) IVPB  3 mg/kg Intravenous Q24H    atorvastatin  10 mg Per NG tube QHS    azithromycin  1,200 mg Oral Weekly    chlorhexidine  15 mL Mouth/Throat BID    famotidine  20 mg Per NG tube Daily    hydrALAZINE  100 mg Per NG tube Q8H    levetiracetam in NaCl (iso-os)  500 mg Intravenous Q12H    sulfamethoxazole-trimethoprim 800-160mg  1 tablet Oral Every Mon, Wed, Fri     PRN Meds:.sodium chloride, sodium chloride, sodium chloride, acetaminophen, acetaminophen, hydrALAZINE, labetalol, magnesium sulfate IVPB, magnesium sulfate IVPB    OBJECTIVE:   Vital Signs (Most Recent):   Temp: 98.7 °F (37.1 °C) (10/02/17 0700)  Pulse: 104 (10/02/17 1000)  Resp: 18 (10/02/17 1000)  BP: 137/88 (10/02/17 1000)  SpO2: 100 % (10/02/17 1000)    Vital Signs (24h Range):   Temp:  [98.2 °F (36.8 °C)-98.9 °F (37.2 °C)] 98.7 °F (37.1 °C)  Pulse:  [] 104  Resp:  [12-40] 18  SpO2:  [94 %-100 %] 100 %  BP: (133-178)/() 137/88  Arterial Line BP: (151-189)/() 189/109    ICP/CPP (Last 24h):        I & O (Last 24h):   Intake/Output Summary (Last 24 hours) at 10/02/17 1028  Last data filed at 10/02/17 1000   Gross per 24 hour   Intake              450 ml   Output                0 ml   Net              450 ml     Physical Exam:  GA: Alert, comfortable, no acute distress.   HEENT: No scleral icterus or JVD.   Pulmonary: Clear to auscultation A/P/L. No wheezing, crackles, or rhonchi.  Cardiac: RRR S1 & S2 w/o rubs/murmurs/gallops.   Abdominal: Bowel sounds present x 4. No appreciable hepatosplenomegaly.  Skin: No  jaundice, rashes, or visible lesions.  Neuro:  Awake  Alert  Follows commands    Lines/Drains/Airway:          Tunneled Central Line Insertion/Assessment - Double Lumen  09/23/17 1100 right internal jugular (Active)   Dressing biopatch in place;dressing dry and intact 10/1/2017  3:00 PM   IV Device Securement sutures 10/1/2017  3:00 PM   Additional Site Signs no erythema;no warmth;no edema;no pain 10/1/2017  3:02 AM   Distal Patency/Care flushed w/o difficulty;normal saline locked 10/1/2017  3:00 PM   Proximal Patency/Care flushed w/o difficulty;normal saline locked 10/1/2017  3:00 PM   Dressing Change Due 10/07/17 10/1/2017  3:00 PM   Daily Line Review Performed 10/1/2017  3:00 PM         Nutrition/Tube Feeds (if NPO state why): start t feeds    Labs:  ABG: No results for input(s): PH, PO2, PCO2, HCO3, POCSATURATED, BE in the last 24 hours.  BMP:  Recent Labs  Lab 10/02/17  0247 10/02/17  0829     141  --    K 4.7  4.7  --      109  --    CO2 24  24  --    BUN 17  17  --    CREATININE 4.8*  4.8*  --    GLU 74  74  --    MG  --  1.8   PHOS 3.3  3.3  --      LFT: Lab Results   Component Value Date    AST 87 (H) 09/25/2017    ALT 49 (H) 09/25/2017    ALKPHOS 112 09/25/2017    BILITOT 0.5 09/25/2017    ALBUMIN 1.6 (L) 10/02/2017    ALBUMIN 1.6 (L) 10/02/2017    PROT 7.7 09/25/2017     CBC:   Lab Results   Component Value Date    WBC 2.99 (L) 10/02/2017    HGB 8.5 (L) 10/02/2017    HCT 26.1 (L) 10/02/2017    MCV 90 10/02/2017    PLT 58 (L) 10/02/2017     Microbiology x 7d:   Microbiology Results (last 7 days)     Procedure Component Value Units Date/Time    Blood culture [[id number]] Collected:  09/27/17 0617    Order Status:  Completed Specimen:  Blood from Peripheral, Antecubital, Right Updated:  10/02/17 1012     Blood Culture, Routine No growth after 5 days.    Narrative:       Blood cultures from 2 different sites. 4 bottles total.  Please draw before starting antibiotics.    Blood culture  [[id number]] Collected:  09/27/17 0625    Order Status:  Completed Specimen:  Blood from Peripheral, Foot, Left Updated:  10/02/17 1012     Blood Culture, Routine No growth after 5 days.    Narrative:       Blood cultures x 2 different sites. 4 bottles total. Please  draw cultures before administering antibiotics.    Blood culture [[id number]] Collected:  09/28/17 1030    Order Status:  Completed Specimen:  Blood from Line, Subclavian, Right Updated:  10/01/17 1412     Blood Culture, Routine No Growth to date     Blood Culture, Routine No Growth to date     Blood Culture, Routine No Growth to date     Blood Culture, Routine No Growth to date    Narrative:       Please draw from R tunneled catheter    CSF culture [[id number]] Collected:  09/26/17 1442    Order Status:  Completed Specimen:  CSF (Spinal Fluid) from CSF Tap, Tube 3 Updated:  10/01/17 0708     CSF CULTURE No Growth     Gram Stain Result Cytospin indicates:      No WBC's      No organisms seen    Culture, Respiratory with Gram Stain [[id number]] Collected:  09/27/17 0709    Order Status:  Completed Specimen:  Respiratory from Endotracheal Aspirate Updated:  09/29/17 1110     Respiratory Culture Normal respiratory brittany     Gram Stain (Respiratory) <10 epithelial cells per low power field.     Gram Stain (Respiratory) Many WBC's     Gram Stain (Respiratory) No organisms seen    Narrative:       Mini-BAL.    Blood culture [[id number]] Collected:  09/24/17 0013    Order Status:  Completed Specimen:  Blood Updated:  09/29/17 0612     Blood Culture, Routine No growth after 5 days.    Narrative:       OBTAIN from Central line please    Blood culture [[id number]] Collected:  09/23/17 2122    Order Status:  Completed Specimen:  Blood Updated:  09/28/17 2312     Blood Culture, Routine No growth after 5 days.    Narrative:       Collection has been rescheduled by RNS at 9/23/2017 20:40 Reason:    came in.  Collection has been rescheduled by RNS at 9/23/2017 20:48  Reason: dr.   still with pt.  Collection has been rescheduled by RNS at 9/23/2017 20:40 Reason:    came in.  Collection has been rescheduled by RNS at 9/23/2017 20:48 Reason: dr.   still with pt.    Blood culture [[id number]] Collected:  09/23/17 2039    Order Status:  Completed Specimen:  Blood Updated:  09/28/17 2312     Blood Culture, Routine No growth after 5 days.    Blood culture [[id number]]     Order Status:  Canceled Specimen:  Blood     Blood culture [[id number]]     Order Status:  Canceled Specimen:  Blood     Culture, Respiratory with Gram Stain [[id number]]     Order Status:  Canceled Specimen:  Respiratory     AFB Culture & Smear [[id number]] Collected:  09/26/17 1442    Order Status:  Completed Specimen:  CSF (Spinal Fluid) from CSF Tap, Tube 3 Updated:  09/27/17 2127     AFB Culture & Smear Culture in progress     AFB CULTURE STAIN No acid fast bacilli seen.    Cryptococcal antigen, CSF [[id number]] Collected:  09/26/17 1442    Order Status:  Completed Specimen:  CSF (Spinal Fluid) from CSF Tap, Tube 3 Updated:  09/27/17 1009     Crypto Ag, CSF Negative    AFB Culture & Smear [[id number]] Collected:  09/26/17 0611    Order Status:  Completed Specimen:  Blood from Blood Updated:  09/27/17 0927     AFB Culture & Smear Culture in progress    Gram stain [[id number]] Collected:  09/26/17 1442    Order Status:  Canceled Specimen:  CSF (Spinal Fluid) from CSF Tap, Tube 3 Updated:  09/26/17 1616    Fungus Culture, Blood or Bone Marrow [[id number]] Collected:  09/26/17 0611    Order Status:  No result Specimen:  Blood from Arm, Left Updated:  09/26/17 0753    Fungus culture [[id number]] Collected:  09/26/17 0611    Order Status:  Canceled Specimen:  Blood from Arm, Left Updated:  09/26/17 0627        Imaging:  CXR and KUB; wnl      I personally reviewed the above image.    ASSESSMENT/PLAN:     Active Hospital Problems    Diagnosis    *Status epilepticus    Seizure    Chronic pancreatitis    Acute respiratory  failure with hypoxia and hypercapnia    [place] coma scale total score 3-8    Alteration in skin integrity    Acute encephalopathy    AIDS    Essential hypertension    ESRD (end stage renal disease)    Thrombocytopenia       Plan:  EEG  Follow exam  Start t feeds  discussed with mother  Discuss with epilepsy  Follow hepatitis panel  Follow exam      Level;3

## 2017-10-02 NOTE — PROCEDURES
DATE OF SERVICE:  10/01/2017    ICU EEG/VIDEO MONITORING REPORT     METHODOLOGY:  Electroencephalographic (EEG) is recorded with electrodes placed   according to the International 10-20 placement system.  Thirty two (32) channels   of digital signal (sampling rate of 512/sec), including T1 and T2, were   simultaneously recorded from the scalp and may include EKG, EMG, and/or eye   monitors.  Recording band pass was 0.1 to 512 Hz.  Digital video recording of   the patient is simultaneously recorded with the EEG.  The patient is instructed   to report clinical symptoms which may occur during the recording session.  EEG   and video recording are stored and archived in digital format.  Activation   procedures, which include photic stimulation, hyperventilation and instructing   patients to perform simple tasks, are done in selected patients.    The EEG is displayed on a monitor screen and can be reviewed using different   montages.  Computer-assisted analysis is employed to detect spike and   electrographic seizure activity.   The entire record is submitted for computer   analysis.  The entire recording is visually reviewed, and the times identified   by computer analysis as being spikes or seizures are reviewed again.    Compressed spectral analysis (CSA) is also performed on the activity recorded   from each individual channel.  This is displayed as a power display of   frequencies from 0 to 30 Hz over time.   The CSA is reviewed looking for   asymmetries in power between homologous areas of the scalp, then compared with   the original EEG recording.    RV ID software was also utilized in the review of this study.  This software   suite analyzes the EEG recording in multiple domains.  Coherence and rhythmicity   are computed to identify EEG sections which may contain organized seizures.    Each channel undergoes analysis to detect the presence of spike and sharp waves   which have special and morphological  characteristics of epileptic activity.  The   routine EEG recording is converted from special into frequency domain.  This is   then displayed comparing homologous areas to identify areas of significant   asymmetry.  Algorithm to identify non-cortically generated artifact is used to   separate artifact from the EEG.      RECORDING TIMES:  Study duration is 23 hours and 50 minutes.  It begins   09/30/2017 at 07:00 and ends 10/01/2017 at 07:00.    EEG FINDINGS:  The background of this study initially contains a significant   amount of artifact, which is repaired few hours into this portion of the   recording.  The patient appears to be agitated and moves a lot and there is a   lot of EMG artifact as well.    The interpretable portions of this study reveal mixed frequency background   predominantly in the theta range with some admixed delta frequencies and also   some superimposed fast activity in the beta range, particularly.  There is good   variability.  There is occasional rhythmic delta activity.  There is occasional   central beta activity.  It is symmetric.  There are no epileptiform discharges.    There are no focal findings or lateralizing findings.  There are no seizures or   clinical events noted.    The accompanying video reveals, patient lying in hospital bed, who is variably   agitated, but is often awake and alert.    INTERPRETATION:  Abnormal EEG due to moderate fluctuating encephalopathy.  There   is no evidence of focal cerebral dysfunction or a seizure disorder on this   study.      NBB/IN  dd: 10/01/2017 11:26:12 (CDT)  td: 10/01/2017 11:43:54 (CDT)  Doc ID   #[id number]  Job ID #[id number]    CC:

## 2017-10-02 NOTE — PROCEDURES
DATE OF SERVICE: 10/01/2017    EEG NUMBER:  OP--4. -5    REFERRING PHYSICIAN:  Dr. Rosado.    This EEG was performed to assess for subclinical seizures.    ICU EEG/VIDEO MONITORING REPORT    METHODOLOGY:  Electroencephalographic (EEG) is recorded with electrodes placed   according to the International 10-20 placement system.  Thirty two (32) channels   of digital signal (sampling rate of 512/sec), including T1 and T2, were   simultaneously recorded from the scalp and may include EKG, EMG, and/or eye   monitors.  Recording band pass was 0.1 to 512 Hz.  Digital video recording of   the patient is simultaneously recorded with the EEG.  The patient is instructed   to report clinical symptoms which may occur during the recording session.  EEG   and video recording are stored and archived in digital format.  Activation   procedures, which include photic stimulation, hyperventilation and instructing   patients to perform simple tasks, are done in selected patients.    The EEG is displayed on a monitor screen and can be reviewed using different   montages.  Computer-assisted analysis is employed to detect spike and   electrographic seizure activity.   The entire record is submitted for computer   analysis.  The entire recording is visually reviewed, and the times identified   by computer analysis as being spikes or seizures are reviewed again.    Compressed spectral analysis (CSA) is also performed on the activity recorded   from each individual channel.  This is displayed as a power display of   frequencies from 0 to 30 Hz over time.   The CSA is reviewed looking for   asymmetries in power between homologous areas of the scalp, then compared with   the original EEG recording.    Nidmi software was also utilized in the review of this study.  This software   suite analyzes the EEG recording in multiple domains.  Coherence and rhythmicity   are computed to identify EEG sections which may contain organized  seizures.    Each channel undergoes analysis to detect the presence of spike and sharp waves   which have special and morphological characteristics of epileptic activity.  The   routine EEG recording is converted from special into frequency domain.  This is   then displayed comparing homologous areas to identify areas of significant   asymmetry.  Algorithm to identify non-cortically generated artifact is used to   separate artifact from the EEG.    RECORDING TIMES:  Start on 10/01/2017, hour 7, minute 0, second 32.  End on 10/02/2017, hour 7, minute 0, second 4.  Total time of video EEG recording was 23 hours and 51 minutes.    RECORDING TIMES:  Start on 10/02/2017, hour 7, minute 0, second 20.  End on 10/03/2017, hour 7, minute 0, second 6.  Start on 10/02/2017, hour 7, minute 0, second 22.  End on 10/03/2017, hour 9, minute 55, second 44.    Total time of video EEG recording was 26 hours and 2 minutes.    EEG FINDINGS:  The recording was obtained with a number of standard bipolar and   referential montages during wakefulness, drowsiness and sleep.  In the alert   state, the posterior background rhythm which is symmetric, nonsustained 8 Hz   activity.  Excessive mixed delta and theta range slowing was noted throughout   the record.  During drowsiness, the background rhythm waxed and waned and there   were periods of slowing.  Activation procedures were not performed.  During   stage II sleep, symmetric V waves and sleep spindles were noted.  There were no   interictal epileptiform abnormalities and no clinical or electrographic seizures   were recorded.    The EKG channel revealed sinus rhythm with sinus tachycardia.    IMPRESSION:  This is an abnormal EEG during wakefulness, drowsiness and sleep.    Diffuse disorganized slowing of the background is noted.    CLINICAL CORRELATION:  The patient is a 52-year-old male with a history of renal   dysfunction and HIV who is currently maintained on Keppra.  This is an  abnormal   EEG during wakefulness, drowsiness and sleep.  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  There is no evidence of an epileptic process on this recording.    No seizures were recorded during this study.  These findings were relayed to   the Neurocritical Care Team.      FAK/KASSIE  dd: 10/02/2017 12:01:19 (CDT)  td: 10/02/2017 12:47:33 (CDT)  Doc ID   #[id number]  Job ID #[id number]    CC:

## 2017-10-03 PROBLEM — G93.40 ENCEPHALOPATHY: Status: ACTIVE | Noted: 2017-10-03

## 2017-10-03 LAB
ALBUMIN SERPL BCP-MCNC: 1.7 G/DL
ALBUMIN SERPL BCP-MCNC: 1.7 G/DL
AMYLASE SERPL-CCNC: 216 U/L
ANION GAP SERPL CALC-SCNC: 8 MMOL/L
ANION GAP SERPL CALC-SCNC: 8 MMOL/L
APTT BLDCRRT: 27.7 SEC
BACTERIA BLD CULT: NORMAL
BASOPHILS # BLD AUTO: 0.02 K/UL
BASOPHILS NFR BLD: 0.7 %
BUN SERPL-MCNC: 9 MG/DL
BUN SERPL-MCNC: 9 MG/DL
CALCIUM SERPL-MCNC: 7.7 MG/DL
CALCIUM SERPL-MCNC: 7.7 MG/DL
CHLORIDE SERPL-SCNC: 108 MMOL/L
CHLORIDE SERPL-SCNC: 108 MMOL/L
CO2 SERPL-SCNC: 24 MMOL/L
CO2 SERPL-SCNC: 24 MMOL/L
CREAT SERPL-MCNC: 2.3 MG/DL
CREAT SERPL-MCNC: 2.3 MG/DL
DIFFERENTIAL METHOD: ABNORMAL
EOSINOPHIL # BLD AUTO: 0 K/UL
EOSINOPHIL NFR BLD: 0 %
ERYTHROCYTE [DISTWIDTH] IN BLOOD BY AUTOMATED COUNT: 17.5 %
EST. GFR  (AFRICAN AMERICAN): 36.1 ML/MIN/1.73 M^2
EST. GFR  (AFRICAN AMERICAN): 36.1 ML/MIN/1.73 M^2
EST. GFR  (NON AFRICAN AMERICAN): 31.3 ML/MIN/1.73 M^2
EST. GFR  (NON AFRICAN AMERICAN): 31.3 ML/MIN/1.73 M^2
GLUCOSE SERPL-MCNC: 76 MG/DL
GLUCOSE SERPL-MCNC: 76 MG/DL
HCT VFR BLD AUTO: 24.8 %
HGB BLD-MCNC: 8.2 G/DL
HISTOPLASMA AG VALUE: 0 NG/ML
HISTOPLASMA ANTIGEN URINE: NEGATIVE
INR PPP: 1.3
JCPYV AB SERPL QL IA: POSITIVE
JCV INDEX: 3.08
LIPASE SERPL-CCNC: 442 U/L
LYMPHOCYTES # BLD AUTO: 0.4 K/UL
LYMPHOCYTES NFR BLD: 14.2 %
MCH RBC QN AUTO: 29.9 PG
MCHC RBC AUTO-ENTMCNC: 33.1 G/DL
MCV RBC AUTO: 91 FL
MONOCYTES # BLD AUTO: 0.9 K/UL
MONOCYTES NFR BLD: 30.1 %
NEUTROPHILS # BLD AUTO: 1.6 K/UL
NEUTROPHILS NFR BLD: 53.6 %
PHOSPHATE SERPL-MCNC: 1.6 MG/DL
PHOSPHATE SERPL-MCNC: 1.6 MG/DL
PLATELET # BLD AUTO: 62 K/UL
PMV BLD AUTO: 10.9 FL
POTASSIUM SERPL-SCNC: 4.6 MMOL/L
POTASSIUM SERPL-SCNC: 4.6 MMOL/L
PROTHROMBIN TIME: 13.8 SEC
RBC # BLD AUTO: 2.74 M/UL
SODIUM SERPL-SCNC: 140 MMOL/L
SODIUM SERPL-SCNC: 140 MMOL/L
WBC # BLD AUTO: 2.89 K/UL

## 2017-10-03 PROCEDURE — 83690 ASSAY OF LIPASE: CPT

## 2017-10-03 PROCEDURE — 99232 SBSQ HOSP IP/OBS MODERATE 35: CPT | Mod: ,,, | Performed by: INTERNAL MEDICINE

## 2017-10-03 PROCEDURE — 97535 SELF CARE MNGMENT TRAINING: CPT

## 2017-10-03 PROCEDURE — 85025 COMPLETE CBC W/AUTO DIFF WBC: CPT

## 2017-10-03 PROCEDURE — 94761 N-INVAS EAR/PLS OXIMETRY MLT: CPT

## 2017-10-03 PROCEDURE — 25000003 PHARM REV CODE 250: Performed by: PSYCHIATRY & NEUROLOGY

## 2017-10-03 PROCEDURE — 97803 MED NUTRITION INDIV SUBSEQ: CPT

## 2017-10-03 PROCEDURE — 97116 GAIT TRAINING THERAPY: CPT

## 2017-10-03 PROCEDURE — 63600175 PHARM REV CODE 636 W HCPCS: Performed by: INTERNAL MEDICINE

## 2017-10-03 PROCEDURE — 25000003 PHARM REV CODE 250: Performed by: INTERNAL MEDICINE

## 2017-10-03 PROCEDURE — 82150 ASSAY OF AMYLASE: CPT

## 2017-10-03 PROCEDURE — 25000003 PHARM REV CODE 250: Performed by: NURSE PRACTITIONER

## 2017-10-03 PROCEDURE — 25000003 PHARM REV CODE 250: Performed by: PHYSICIAN ASSISTANT

## 2017-10-03 PROCEDURE — 97530 THERAPEUTIC ACTIVITIES: CPT

## 2017-10-03 PROCEDURE — 85730 THROMBOPLASTIN TIME PARTIAL: CPT

## 2017-10-03 PROCEDURE — 85610 PROTHROMBIN TIME: CPT

## 2017-10-03 PROCEDURE — 80069 RENAL FUNCTION PANEL: CPT

## 2017-10-03 PROCEDURE — 63600175 PHARM REV CODE 636 W HCPCS: Performed by: PHYSICIAN ASSISTANT

## 2017-10-03 PROCEDURE — 11000001 HC ACUTE MED/SURG PRIVATE ROOM

## 2017-10-03 PROCEDURE — 99233 SBSQ HOSP IP/OBS HIGH 50: CPT | Mod: GC,,, | Performed by: INTERNAL MEDICINE

## 2017-10-03 PROCEDURE — 92526 ORAL FUNCTION THERAPY: CPT

## 2017-10-03 RX ORDER — ATORVASTATIN CALCIUM 10 MG/1
10 TABLET, FILM COATED ORAL NIGHTLY
Status: DISCONTINUED | OUTPATIENT
Start: 2017-10-03 | End: 2017-10-05 | Stop reason: HOSPADM

## 2017-10-03 RX ORDER — AMLODIPINE BESYLATE 5 MG/1
5 TABLET ORAL DAILY
Status: DISCONTINUED | OUTPATIENT
Start: 2017-10-04 | End: 2017-10-05 | Stop reason: HOSPADM

## 2017-10-03 RX ORDER — LEVETIRACETAM 500 MG/1
500 TABLET ORAL 2 TIMES DAILY
Status: DISCONTINUED | OUTPATIENT
Start: 2017-10-03 | End: 2017-10-05 | Stop reason: HOSPADM

## 2017-10-03 RX ORDER — HYDRALAZINE HYDROCHLORIDE 25 MG/1
100 TABLET, FILM COATED ORAL EVERY 8 HOURS
Status: DISCONTINUED | OUTPATIENT
Start: 2017-10-03 | End: 2017-10-05 | Stop reason: HOSPADM

## 2017-10-03 RX ADMIN — LEVETIRACETAM 500 MG: 5 INJECTION INTRAVENOUS at 08:10

## 2017-10-03 RX ADMIN — AMPHOTERICIN B 200 MG: 50 INJECTION, POWDER, LYOPHILIZED, FOR SOLUTION INTRAVENOUS at 08:10

## 2017-10-03 RX ADMIN — POTASSIUM & SODIUM PHOSPHATES POWDER PACK 280-160-250 MG 2 PACKET: 280-160-250 PACK at 06:10

## 2017-10-03 RX ADMIN — HYDRALAZINE HYDROCHLORIDE 100 MG: 50 TABLET ORAL at 06:10

## 2017-10-03 RX ADMIN — HYDRALAZINE HYDROCHLORIDE 100 MG: 50 TABLET ORAL at 09:10

## 2017-10-03 RX ADMIN — HYDRALAZINE HYDROCHLORIDE 100 MG: 50 TABLET ORAL at 01:10

## 2017-10-03 RX ADMIN — ACETAMINOPHEN 650 MG: 650 SOLUTION ORAL at 11:10

## 2017-10-03 RX ADMIN — AMLODIPINE BESYLATE 5 MG: 5 TABLET ORAL at 08:10

## 2017-10-03 RX ADMIN — ATORVASTATIN CALCIUM 10 MG: 10 TABLET, FILM COATED ORAL at 08:10

## 2017-10-03 RX ADMIN — LEVETIRACETAM 500 MG: 500 TABLET ORAL at 08:10

## 2017-10-03 NOTE — PLAN OF CARE
Problem: Occupational Therapy Goal  Goal: Occupational Therapy Goal  Goals to be met by: 7 days    Patient will increase functional independence with ADLs by performing:    Feeding with Set-up Assistance (once diet is advanced). Met 10/3  UE Dressing with Moderate Assistance.  LE Dressing with Maximum Assistance.  Grooming while seated with Minimal Assistance.  Toileting from bedside commode with Maximum Assistance for hygiene and clothing management.   Rolling to Bilateral with Modified [place].   Supine to sit with Contact Guard Assistance.  Toilet transfer to bedside commode with Maximum Assistance.     Outcome: Ongoing (interventions implemented as appropriate)  OT goals remain appropriate.  SHAILESH Fine  10/3/2017

## 2017-10-03 NOTE — PROGRESS NOTES
[facility]  Adult Nutrition  Consult Note    SUMMARY     Recommendations    Recommendation/Intervention:   1. Continue Regular diet with texture per SLP recommendations. Add renal restriction if electrolytes elevate.   2. Encourage and promote po intake for optimal nutrition.   3. If po intake declines, add Novasource Renal OD TID to increase caloric intake.     RD to monitor.      Goals: Pt to receive and tolerate >50% of meals by RD follow up  Nutrition Goal Status: goal met  Communication of RD Recs: reviewed with RN    Reason for Assessment    Reason for Assessment: RD follow-up  Diagnosis: seizures  Relevent Medical History: HIV/AIDS, ESRD, HTN, seizures         General Information Comments: Pt's diet advanced per SLP recommendations.     Nutrition Discharge Planning: adequate po intake for optimal nutrition    Nutrition Prescription Ordered    Current Diet Order: pureed     Evaluation of Received Nutrients/Fluid Intake     I/O: +I/O, CRRT      % Intake of Estimated Energy Needs: 75 - 100 %  % Meal Intake: 100%     Nutrition Risk Screen     Nutrition Risk Screen: no indicators present    Nutrition/Diet History       Typical Food/Fluid Intake: Pt consuming 100% of meals.  Food Preferences: No Voodoo/cultural preferences identified at this time.        Factors Affecting Nutritional Intake: chewing difficulties/inability to chew food, difficulty/impaired swallowing                Labs/Tests/Procedures/Meds       Pertinent Labs Reviewed: reviewed  Pertinent Labs Comments: Cr 2.3, Ph 1.6  Pertinent Medications Reviewed: reviewed  Pertinent Medications Comments: statin    Physical Findings    Overall Physical Appearance: lethargic, listlessness, nourished        Skin: other (see comments) (ulceration)    Anthropometrics    Temp: 99.8 °F (37.7 °C)     Height: 6' (182.9 cm)  Weight Method: Bed Scale  Weight: 68.2 kg (150 lb 5.7 oz)  Ideal Body Weight (IBW), Male: 178 lb     % Ideal Body Weight,  Male (lb): 87.42 lb     BMI (Calculated): 21.1  BMI Grade: 18.5-24.9 - normal                            Estimated/Assessed Needs    Weight Used For Calorie Calculations: 78.8 kg (173 lb 11.6 oz)      Energy Calorie Requirements (kcal): 2088  Energy Need Method: [place]-St Jeor (PAL 1.25)        RMR ([place]-St. Jeor Equation): 1671        Weight Used For Protein Calculations: 78.8 kg (173 lb 11.6 oz)  Protein Requirements: 79-95g (1.0-1.2g/kg)    Fluid Requirements (mL): 1mL/kcal or per MD  RDA Method (mL): 2088               Assessment and Plan    * Status epilepticus    Nutrition Problem:  Inadequate energy intake    Related to (etiology):   Inability to consume sufficient energy    Signs and Symptoms (as evidenced by):   NPO, no diet advancement at this time.     Interventions/Recommendations (treatment strategy):  Please see RD recs above.    Nutrition Diagnosis Status:   Resolved              Monitor and Evaluation    Food and Nutrient Intake: energy intake, food and beverage intake  Food and Nutrient Adminstration: diet order        Anthropometric Measurements: weight, weight change, body mass index  Biochemical Data, Medical Tests and Procedures: electrolyte and renal panel, gastrointestinal profile, glucose/endocrine profile, inflammatory profile, lipid profile  Nutrition-Focused Physical Findings: overall appearance    Nutrition Risk    Level of Risk: other (see comments) (f/u 1x/week)    Nutrition Follow-Up    RD Follow-up?: Yes

## 2017-10-03 NOTE — PLAN OF CARE
[facility] [phone] is the same as [facility] [phone] per  at [facility]

## 2017-10-03 NOTE — MEDICAL/APP STUDENT
Progress Note  Infectious Diseases    Patient Name: Volodymyr Pope  YOB: 1965    Admit Date: 9/23/2017                     LOS: 10    SUBJECTIVE:     Reason for Admission:  Status epilepticus  See H&P for detailed presentating history and ROS.      Interval history:     Overnight, no problems. No cough, fevers/chills, or night sweats. He was alert and oriented and says he feels good. He wants to get back to [place] and says he has already been in touch with his ID provider there to try and get back on therapy.     OBJECTIVE:     Vital Signs Range (Last 24H):  Temp:  [98.5 °F (36.9 °C)-99.8 °F (37.7 °C)]   Pulse:  []   Resp:  [12-29]   BP: (118-170)/()   SpO2:  [96 %-100 %] Body mass index is 20.39 kg/m².  Wt Readings from Last 1 Encounters:   10/03/17 0300 68.2 kg (150 lb 5.7 oz)   09/27/17 0705 78.8 kg (173 lb 11.6 oz)   09/23/17 1957 70.6 kg (155 lb 9.6 oz)       Physical Exam:  General: well developed, well nourished, no distress  Eyes: conjunctivae/corneas clear. PERRL.   Lungs:  clear to auscultation bilaterally and normal respiratory effort  Cardiovascular: Heart: regular rate and rhythm, S1, S2 normal, no murmur, click, rub or gallop.  Abdomen/Rectal: Abdomen: soft, non-tender non-distented; bowel sounds normal; no masses,  no organomegaly.  Neurologic: Normal strength and tone. No focal numbness or weakness  Mental status: alertness: alert, orientation: time, person, place  Psych/Behavioral:  Behavior: normal and Thought content: tangential    Diagnostic Results:  Lab Results   Component Value Date    WBC 2.89 (L) 10/03/2017    HGB 8.2 (L) 10/03/2017    HCT 24.8 (L) 10/03/2017    MCV 91 10/03/2017    PLT 62 (L) 10/03/2017       Recent Labs  Lab 10/03/17  0311   GLU 76  76     140   K 4.6  4.6     108   CO2 24  24   BUN 9  9   CREATININE 2.3*  2.3*   CALCIUM 7.7*  7.7*     Viral Testing: Neg for EBV, CMV, HSV, VZV, West Nile, and Crypto in CSF  Positive CMV IgG, HSV,  and HHV-g IgG titer 1:320 in blood. Negative Crypto antigen in blood  HIV RNA 58,053 with 4.76 log units. CD4 count on 9/23 was 50, with 18.3%  Minimally reactive FTA-ABS with negative RPR  Hepatitis panel negative  Urine histo Negative   MAC pending  9/26 AFB gram stain negative, with AFB culture in process  9/27 blood cultures NGTD  9/28 blood cultures NGTD      Imaging    9/27 CT Abdo/Pelvis:   1.  Multiple prominent and mildly enlarged periaortic lymph nodes.  These are of uncertain etiology and could possibly be reactive versus sequela of lymphoproliferative process.  Clinical correlation and further evaluation as warranted.  2.  Diffuse gallbladder wall thickening, which could be reactive secondary to underlying hepatic dysfunction.  If there is clinical concern for acute cholecystitis, further evaluation with ultrasound can be performed.  Mild periportal edema and mild hepatomegaly.  3.  Bibasilar atelectatic versus consolidative change, clinical correlation advised.  Endotracheal tube in place.  4.  Nonspecific small volume of free pelvic fluid.    9/25 MRI Brain with Contrast:  Multifocal gyral diffusion restriction and T2/FLAIR hyperintensity involving the bilateral frontal and parietal lobes (particularly the left frontal eye field) could be related to HIV encephalopathy/HIV associated infection, seizure activity, or multifocal infarcts.  Progressive multifocal leukoencephalopathy can be seen in HIV/AIDS with and can affect gray matter in late stages of the disease, however it usually affects the subcortical U fibers which are mostly spared in this case.  Correlation with CSF analysis is recommended.  T2/FLAIR hyperintensity predominately affecting the periventricular white matter may be related to HIV associated encephalopathy and/or chronic white matter ischemic change.  Negative MRA of the head and neck.    ASSESSMENT/PLAN:     Active Hospital Problems    Diagnosis  POA    *Status epilepticus  [G40.901]  Yes    Encephalopathy [G93.40]  Yes    Seizure [R56.9]  Yes    Chronic pancreatitis [K86.1]  Yes    Acute respiratory failure with hypoxia and hypercapnia [J96.01, J96.02]  Yes    Santiago coma scale total score 3-8 [R40.2430]  Yes    Alteration in skin integrity [R23.9]  Yes    Acute encephalopathy [G93.40]  Yes    AIDS [B20]  Yes    Essential hypertension [I10]  Yes    ESRD (end stage renal disease) [N18.6]  Yes    Thrombocytopenia [D69.6]  Yes      Resolved Hospital Problems    Diagnosis Date Resolved POA   No resolved problems to display.       Plan  Continue Azithromycin 1200mg qweekly, and Bactrim 800-160mg MWF  Urine histoplasma negative. May be able to stop amphotericin.  F/u MAC cultures. Lymphadenopathy seen on CT may be related to MAC  Hepatitis panel negative. No immunity to Hep A or B. Recommend both vaccinations once on HAART therapy and CD4 count greater than 200  Genotype HIV pending. Defer HAART start until patient in [place] with regular care and follow-up.      Tracy Haji  MS4

## 2017-10-03 NOTE — PLAN OF CARE
SUZE spoke with Chelsie with [facility] in [place] ([phone]). She had questions about his progress and was requesting clinical documents from today to be faxed to: [phone].     SUZE completed [employer] packet and faxed via [employer] to the facility.    SUZE advised by CM that the Pt mother is at bedside asking for transportation numbers. SUZE made list and gave to Pt mother at bedside.    Sridevi Spears, VERONICA  Neurocritical Care   [facility]  [phone]

## 2017-10-03 NOTE — PLAN OF CARE
SW received call from POA regarding Pt rehab choices. She reported having spoken with the Pt and wanted to add the another rehab for consideration. He can chose his preference closer to discharge. She reported she is also getting quotes for transportation and asked for the address to give for here.     SUZE sent email to [facility] to complete referral to: [facility], [address], [place] [address], [phone]    Sridevi Spears LMSW  Neurocritical Care   [facility]  [phone]

## 2017-10-03 NOTE — PLAN OF CARE
Problem: Physical Therapy Goal  Goal: Physical Therapy Goal  Goals to be met by: 10/10/2017     Patient will increase functional independence with mobility by performin. Supine to sit with MInimal Assistance  2. Sit to supine with MInimal Assistance  3. Sit to stand transfer with Maximum Assistance-met   Revised: Sit to stand transfer with Min A  4. Bed to chair transfer with Maximum Assistance  5. Gait  x 10 feet with Maximum Assistance using appropriate AD  6. Lower extremity exercise program x15 reps per handout, with assistance as needed      Outcome: Ongoing (interventions implemented as appropriate)  Pt progressing towards goals.     CONSUELO HOLCOMB, PT  10/3/2017

## 2017-10-03 NOTE — PLAN OF CARE
Problem: Patient Care Overview  Goal: Plan of Care Review  Outcome: Ongoing (interventions implemented as appropriate)  POC reviewed with pt and family at 1400. Pt and mother verbalized understanding. Questions and concerns addressed. No acute events today. Pt progressing toward goals. Will continue to monitor. See flowsheets for full assessment and VS info.     Patient to be stepped down pending open room. Patient diet advanced to dental soft this afternoon.
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
## 2017-10-03 NOTE — PLAN OF CARE
Problem: Patient Care Overview  Goal: Plan of Care Review  POC reviewed with pt at 0500. Pt verbalized understanding. Questions and concerns addressed. No acute events overnight. Pt progressing toward goals. Will continue to monitor. See flowsheets for full assessment and VS info

## 2017-10-03 NOTE — SUBJECTIVE & OBJECTIVE
Interval History:   12 hour SLED completed overnight, net negative 1.4L/24h.  Electrolytes stable.  Sitting in bedside chair this morning, oxygenating well on RA.    Review of patient's allergies indicates:  No Known Allergies  Current Facility-Administered Medications   Medication Frequency    acetaminophen oral solution 650 mg Q6H PRN    acetaminophen tablet 500 mg Daily PRN    [START ON 10/4/2017] amlodipine tablet 5 mg Daily    amphotericin B liposome (AMBISOME) 200 mg in dextrose 5 % 200 mL IVPB Q24H    atorvastatin tablet 10 mg QHS    azithromycin tablet 1,200 mg Weekly    hydrALAZINE tablet 100 mg Q8H    levetiracetam tablet 500 mg BID    sulfamethoxazole-trimethoprim 800-160mg per tablet 1 tablet Every Mon, Wed, Fri       Objective:     Vital Signs (Most Recent):  Temp: 98.8 °F (37.1 °C) (10/03/17 1500)  Pulse: 93 (10/03/17 1500)  Resp: 18 (10/03/17 1500)  BP: (!) 147/93 (10/03/17 1500)  SpO2: 98 % (10/03/17 1500)  O2 Device (Oxygen Therapy): room air (10/03/17 1500) Vital Signs (24h Range):  Temp:  [98.5 °F (36.9 °C)-99.8 °F (37.7 °C)] 98.8 °F (37.1 °C)  Pulse:  [] 93  Resp:  [12-29] 18  SpO2:  [96 %-100 %] 98 %  BP: (118-170)/() 147/93     Weight: 68.2 kg (150 lb 5.7 oz) (10/03/17 0300)  Body mass index is 20.39 kg/m².  Body surface area is 1.86 meters squared.    I/O last 3 completed shifts:  In: 2825 [I.V.:2325; IV Piggyback:500]  Out: 4190 [Other:4190]    Physical Exam   Constitutional: He is oriented to person, place, and time. He appears well-developed and well-nourished.   HENT:   Head: Normocephalic and atraumatic.   Eyes: Pupils are equal, round, and reactive to light.   Neck: Neck supple.   Cardiovascular: Normal rate, regular rhythm and normal heart sounds.  Exam reveals no friction rub.    No murmur heard.  Pulmonary/Chest: Effort normal and breath sounds normal. He has no wheezes. He has no rales.   Abdominal: Soft. Bowel sounds are normal. He exhibits no distension. There  is no tenderness. There is no guarding.   Musculoskeletal: He exhibits edema (bilateral hands). He exhibits no deformity.   Neurological: He is alert and oriented to person, place, and time.   Skin: Skin is warm and dry. No rash noted. No erythema.       Significant Labs:  CBC:   Recent Labs  Lab 10/03/17  0311   WBC 2.89*   RBC 2.74*   HGB 8.2*   HCT 24.8*   PLT 62*   MCV 91   MCH 29.9   MCHC 33.1     CMP:   Recent Labs  Lab 10/03/17  0311   GLU 76  76   CALCIUM 7.7*  7.7*   ALBUMIN 1.7*  1.7*     140   K 4.6  4.6   CO2 24  24     108   BUN 9  9   CREATININE 2.3*  2.3*

## 2017-10-03 NOTE — PT/OT/SLP PROGRESS
"Occupational Therapy  Treatment    Volodymyr Pope   MRN: [id number]   Admitting Diagnosis: Status epilepticus    OT Date of Treatment: 10/03/17   OT Start Time: 1307  OT Stop Time: 1351  OT Total Time (min): 44 min    Billable Minutes:  Self Care/Home Management 30 and Therapeutic Activity 14    General Precautions: Standard, fall, aspiration  Orthopedic Precautions: N/A  Braces: N/A    Subjective:  Communicated with RN prior to session.    Pain/Comfort  Pain Rating 1: 0/10  Pain Rating Post-Intervention 1: 0/10    Objective:  Patient found with: blood pressure cuff, telemetry, pulse ox (continuous), peripheral IV     Functional Mobility:  Bed Mobility:  Sit->supine: Max A    Transfers:  Sit <> Stand Assistance: Minimum Assistance x 1 trial from bedside chair x 2 trials from EOB  Sit <> Stand Assistive Device: No Assistive Device    Bed <> Chair Technique: Squat Pivot bedside chair->EOB  Bed <> Chair Transfer Assistance: Moderate Assistance  Bed <> Chair Assistive Device: No Assistive Device      Activities of Daily Living:  Feeding Level of Assistance: Set-up Assistance with max cues to alternate bites/sips and take smaller bites     Grooming: Pt washed face/hands seated in chair with set-up; pt completed oral care seated in chair with set-up    AM-PAC 6 CLICK ADL   How much help from another person does this patient currently need?   1 = Unable, Total/Dependent Assistance  2 = A lot, Maximum/Moderate Assistance  3 = A little, Minimum/Contact Guard/Supervision  4 = None, Modified [place]/Independent    Putting on and taking off regular lower body clothing? : 2  Bathing (including washing, rinsing, drying)?: 2  Toileting, which includes using toilet, bedpan, or urinal? : 2  Putting on and taking off regular upper body clothing?: 3  Taking care of personal grooming such as brushing teeth?: 3  Eating meals?: 3  Total Score: 15     AM-PAC Raw Score CMS "G-Code Modifier Level of Impairment Assistance   6 % Total " / Unable   7 - 8 CM 80 - 100% Maximal Assist   9-13 CL 60 - 80% Moderate Assist   14 - 19 CK 40 - 60% Moderate Assist   20 - 22 CJ 20 - 40% Minimal Assist   23 CI 1-20% SBA / CGA   24 CH 0% Independent/ Mod I       Patient left supine with all lines intact, call button in reach and RN present    ASSESSMENT:  Volodymyr Pope is a 52 y.o. male with a medical diagnosis of Status epilepticus and presents with improved cognition, attention, and command following in today's session.  Pt POC increased to 5x/week with recommendation to progress to IP REHAB 2* pt progress and increased ability to participate.      Rehab identified problem list/impairments: Rehab identified problem list/impairments: weakness, impaired endurance, impaired self care skills, impaired functional mobilty, impaired balance, gait instability, impaired coordination, impaired fine motor, impaired cognition, decreased safety awareness, decreased upper extremity function, decreased lower extremity function    Rehab potential is good.    Activity tolerance: Good    Discharge recommendations: Discharge Facility/Level Of Care Needs: rehabilitation facility     Barriers to discharge: Barriers to Discharge: Decreased caregiver support    Equipment recommendations:  (TBD)     GOALS:    Occupational Therapy Goals        Problem: Occupational Therapy Goal    Goal Priority Disciplines Outcome Interventions   Occupational Therapy Goal     OT, PT/OT Ongoing (interventions implemented as appropriate)    Description:  Goals to be met by: 7 days    Patient will increase functional independence with ADLs by performing:    Feeding with Set-up Assistance (once diet is advanced). Met 10/3  UE Dressing with Moderate Assistance.  LE Dressing with Maximum Assistance.  Grooming while seated with Minimal Assistance.  Toileting from bedside commode with Maximum Assistance for hygiene and clothing management.   Rolling to Bilateral with Modified [place].   Supine to sit with  Contact Guard Assistance.  Toilet transfer to bedside commode with Maximum Assistance.                       Plan:  Patient to be seen 5 x/week to address the above listed problems via self-care/home management, therapeutic activities, therapeutic exercises  Plan of Care expires: 10/31/17  Plan of Care reviewed with: patient         Piter K SHAILESH Shannon  10/03/2017
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
## 2017-10-03 NOTE — PLAN OF CARE
Transfer of Care Acceptance Note    Pending histoplasma ag for disposition.    Medicine will accept patient for transfer to a hospital medicine service but patient not to be transferred to medicine team until tomorrow, 10/04/17 at 7:00 am. I spoke with primary service who is requesting transfer and informed them they are responsible for patient's care until tomorrow morning. Patient to be assigned to a medicine team by nocturnist tonight and to be temporarily placed on IMT list for reassignment in the morning.       Thank you and please call if you have any further questions.

## 2017-10-03 NOTE — ASSESSMENT & PLAN NOTE
51 y/o with AIDS admitted with encephalopathy.  Mental status is still poor but appears to be a bit better.  MRI imaging revealed abnormalities.  Differential includes HIV encephalopathy.  LEAH virus also possible.  Therapy for both of these is to improve the immune system with HAART.  Awaiting results of serum histo antigen.  Will likely start HAART after results return.    - HIV genotype pending  - follow histoplasma results

## 2017-10-03 NOTE — SUBJECTIVE & OBJECTIVE
Interval History: Now oriented to person, place, and time.    Review of Systems   Constitutional: Negative for activity change, chills, diaphoresis, fatigue and fever.   Respiratory: Negative for cough and shortness of breath.    Gastrointestinal: Negative for abdominal distention, abdominal pain, diarrhea, nausea and vomiting.     Objective:     Vital Signs (Most Recent):  Temp: 99.8 °F (37.7 °C) (10/03/17 0800)  Pulse: 102 (10/03/17 0900)  Resp: 16 (10/03/17 0900)  BP: (!) 155/105 (10/03/17 0900)  SpO2: 98 % (10/03/17 0900) Vital Signs (24h Range):  Temp:  [98.5 °F (36.9 °C)-99.8 °F (37.7 °C)] 99.8 °F (37.7 °C)  Pulse:  [] 102  Resp:  [12-29] 16  SpO2:  [96 %-100 %] 98 %  BP: (109-170)/() 155/105     Weight: 68.2 kg (150 lb 5.7 oz)  Body mass index is 20.39 kg/m².    Estimated Creatinine Clearance: 35.8 mL/min (based on SCr of 2.3 mg/dL (H)).    Physical Exam   Constitutional: He appears well-developed and well-nourished.   Eyes: EOM are normal. Pupils are equal, round, and reactive to light.   Neck: Normal range of motion.   Cardiovascular: Normal rate, regular rhythm and normal heart sounds.    Pulmonary/Chest: Effort normal and breath sounds normal.   Abdominal: Soft. Bowel sounds are normal. He exhibits no distension. There is no tenderness. There is no rebound.   Neurological: He is alert.   Skin: No rash noted.       Significant Labs:   Blood Culture:     Recent Labs  Lab 09/23/17  2122 09/24/17  0013 09/27/17  0617 09/27/17  0625 09/28/17  1030   LABBLOO No growth after 5 days. No growth after 5 days. No growth after 5 days. No growth after 5 days. No Growth to date  No Growth to date  No Growth to date  No Growth to date  No Growth to date     BMP:     Recent Labs  Lab 10/02/17  1510  10/03/17  0311   GLU 96  < > 76  76     < > 140  140   K 4.7  < > 4.6  4.6     < > 108  108   CO2 25  < > 24  24   BUN 25*  < > 9  9   CREATININE 6.3*  < > 2.3*  2.3*   CALCIUM 7.8*  < >  7.7*  7.7*   MG 2.0  --   --    < > = values in this interval not displayed.  CBC:     Recent Labs  Lab 10/02/17  0247 10/03/17  0311   WBC 2.99* 2.89*   HGB 8.5* 8.2*   HCT 26.1* 24.8*   PLT 58* 62*     Microbiology Results (last 7 days)     Procedure Component Value Units Date/Time    Blood culture [[id number]] Collected:  09/28/17 1030    Order Status:  Completed Specimen:  Blood from Line, Subclavian, Right Updated:  10/02/17 1412     Blood Culture, Routine No Growth to date     Blood Culture, Routine No Growth to date     Blood Culture, Routine No Growth to date     Blood Culture, Routine No Growth to date     Blood Culture, Routine No Growth to date    Narrative:       Please draw from R tunneled catheter    Fungus Culture, Blood or Bone Marrow [[id number]] Collected:  09/26/17 0611    Order Status:  Completed Specimen:  Blood from Arm, Left Updated:  10/02/17 1406     Fungus Cult, blood or BM Culture in progress    Blood culture [[id number]] Collected:  09/27/17 0617    Order Status:  Completed Specimen:  Blood from Peripheral, Antecubital, Right Updated:  10/02/17 1012     Blood Culture, Routine No growth after 5 days.    Narrative:       Blood cultures from 2 different sites. 4 bottles total.  Please draw before starting antibiotics.    Blood culture [[id number]] Collected:  09/27/17 0625    Order Status:  Completed Specimen:  Blood from Peripheral, Foot, Left Updated:  10/02/17 1012     Blood Culture, Routine No growth after 5 days.    Narrative:       Blood cultures x 2 different sites. 4 bottles total. Please  draw cultures before administering antibiotics.    CSF culture [[id number]] Collected:  09/26/17 1442    Order Status:  Completed Specimen:  CSF (Spinal Fluid) from CSF Tap, Tube 3 Updated:  10/01/17 0708     CSF CULTURE No Growth     Gram Stain Result Cytospin indicates:      No WBC's      No organisms seen    Culture, Respiratory with Gram Stain [[id number]] Collected:  09/27/17 0709    Order Status:   Completed Specimen:  Respiratory from Endotracheal Aspirate Updated:  09/29/17 1110     Respiratory Culture Normal respiratory brittany     Gram Stain (Respiratory) <10 epithelial cells per low power field.     Gram Stain (Respiratory) Many WBC's     Gram Stain (Respiratory) No organisms seen    Narrative:       Mini-BAL.    Blood culture [[id number]] Collected:  09/24/17 0013    Order Status:  Completed Specimen:  Blood Updated:  09/29/17 0612     Blood Culture, Routine No growth after 5 days.    Narrative:       OBTAIN from Central line please    Blood culture [[id number]] Collected:  09/23/17 2122    Order Status:  Completed Specimen:  Blood Updated:  09/28/17 2312     Blood Culture, Routine No growth after 5 days.    Narrative:       Collection has been rescheduled by RNS at 9/23/2017 20:40 Reason: dr. garza in.  Collection has been rescheduled by RNS at 9/23/2017 20:48 Reason: dr.   still with pt.  Collection has been rescheduled by RNS at 9/23/2017 20:40 Reason: dr. garza in.  Collection has been rescheduled by RNS at 9/23/2017 20:48 Reason: dr.   still with pt.    Blood culture [[id number]] Collected:  09/23/17 2039    Order Status:  Completed Specimen:  Blood Updated:  09/28/17 2312     Blood Culture, Routine No growth after 5 days.    Blood culture [[id number]]     Order Status:  Canceled Specimen:  Blood     Blood culture [[id number]]     Order Status:  Canceled Specimen:  Blood     Culture, Respiratory with Gram Stain [[id number]]     Order Status:  Canceled Specimen:  Respiratory     AFB Culture & Smear [[id number]] Collected:  09/26/17 1442    Order Status:  Completed Specimen:  CSF (Spinal Fluid) from CSF Tap, Tube 3 Updated:  09/27/17 2127     AFB Culture & Smear Culture in progress     AFB CULTURE STAIN No acid fast bacilli seen.    Cryptococcal antigen, CSF [[id number]] Collected:  09/26/17 1442    Order Status:  Completed Specimen:  CSF (Spinal Fluid) from CSF Tap, Tube 3 Updated:  09/27/17 1009     Crypto  Ag, CSF Negative    AFB Culture & Smear [[id number]] Collected:  09/26/17 0611    Order Status:  Completed Specimen:  Blood from Blood Updated:  09/27/17 0927     AFB Culture & Smear Culture in progress    Gram stain [[id number]] Collected:  09/26/17 1442    Order Status:  Canceled Specimen:  CSF (Spinal Fluid) from CSF Tap, Tube 3 Updated:  09/26/17 1616          Significant Imaging: I have reviewed all pertinent imaging results/findings within the past 24 hours.

## 2017-10-03 NOTE — ASSESSMENT & PLAN NOTE
-Patient is ESRD on HD since May 2017? Underlying etiology uncertain, ?HIV nephropathy, history of non-compliance with HD treatments.    -12 hour SLED overnight, net negative 1.4L/24h.  Electrolytes stable.  -no indication for RRT today.  Will re-evaluate in AM for further needs.

## 2017-10-03 NOTE — PT/OT/SLP PROGRESS
"Speech Language Pathology  Treatment    Volodymyr Pope   MRN: [id number]   Admitting Diagnosis: Status epilepticus    Diet recommendations: Solid Diet Level: Dental Soft  Liquid Diet Level: Thin HOB to 90 degrees, Small bites/sips, Alternating bites/sips, 1 bite/sip at a time, Meds crushed in puree, Eliminate distractions, Avoid talking while eating and Assistance with meals Max cues to monitor bite/sip size and slow rate of intake.     SLP Treatment Date: 10/03/17  Speech Start Time: 0930     Speech Stop Time: 0946     Speech Total (min): 16 min       TREATMENT BILLABLE MINUTES:  Treatment Swallowing Dysfunction 8 and Seld Care/Home Management Training 8    Has the patient been evaluated by SLP for swallowing? : Yes  Keep patient NPO?: No   General Precautions: Standard, aspiration, fall          Subjective:  "Thank you."  "He used a straw last night." pt's mother reported pt tolerating straw sips of thin liquids without difficulty last night.    Pain/Comfort  Pain Rating 1: 0/10    Objective:   Patient found with: telemetry, blood pressure cuff, pulse ox (continuous), peripheral IV    Pt was seen for ongoing swallowing assessment to determine if appropriate for diet upgrade. Per nurse, pt tolerating pureed diet well and PO intake has been good.  Pt's mother reported pt has been drinking with a  Straw and tolerating well without overt s/s of aspiration.  Pt observed consuming 1/4 turkey sandwich, 4oz cranberry juice via cup, and 3 straw sips of milk.  Pt tended to be impulsive during PO intake, taking large bites and sips despite cues to take small bites/sips.  Mastication and bolus formation were functional for soft solid trials, with minimal lingual residue present after the swallow. Residue was successfully cleared with liquid wash. No overt s/s of aspiration were observed. Education provided to pt and mother regarding recommendations to advance diet to dental soft consistencies, continued need for monitoring during " meals to cue for use of aspiration precautions, and SLP POC.  Understanding was verbalized, though pt will likely need continued reinforcement to utilize aspiration precautions. White board updated with current diet recs.  Nurse notified of recs.  Sticky note written to physicians for recommendations for diet advancement.     Assessment:  Volodymyr Pope is a 52 y.o. male with a medical diagnosis of Status epilepticus and presents with dysphagia (improving) and cognitive-linguistic deficits.     Discharge recommendations: Discharge Facility/Level Of Care Needs: rehabilitation facility     Goals:    SLP Goals        Problem: SLP Goal    Goal Priority Disciplines Outcome   SLP Goal     SLP Ongoing (interventions implemented as appropriate)   Description:  Speech Language Pathology Goals  Goals expected to be met by 10/9     1. Pt will tolerate diet of thin liquids and purees without overt s/s of aspiration - goal met 10/3, advanced to dental soft  2. Pt will participate in ongoing assessment of swallow function to help determine least restrictive diet   3. Pt will orient x4 with min cues.  4. Pt will complete recall tasks with 70% accuracy given min-mod cues to increase comprehension.  5. Pt will complete moderate level problem solving tasks with 80% accuracy given min cues to enhance independence with ADLs.  6.  Pt will complete higher level word finding tasks with 90% accuracy, to improve expressive language skills.   7. Pt will participate in reading/writing/visual-spatial eval to rule out further deficits.                       Plan:   Patient to be seen Therapy Frequency: 5 x/week   Plan of Care expires: 10/29/17  Plan of Care reviewed with: patient, mother  SLP Follow-up?: Yes              MIR Hopkins, CCC-SLP  10/03/2017   MIR Hopkins, CCC-SLP  Speech Language Pathologist  [phone]  10/3/2017

## 2017-10-03 NOTE — PROGRESS NOTES
[facility]  Nephrology  Progress Note    Patient Name: Volodymyr Pope  MRN: [id number]  Admission Date: 9/23/2017  Hospital Length of Stay: 10 days  Attending Provider: Julio Granger MD   Primary Care Physician: Bulmaro Flores  Principal Problem:Status epilepticus    Subjective:     HPI: 51 yearold male with AIDS (CD 49 not on ART or PPx), ESRD on HD (M,W,F), and seizure disorder presents as a transfer from [facility] for Neurology eval and LP.  Patient presented to OSH on 9/15 from a dialysis center for HTN.  He was admitted for HTN urgency and routine workup revealed thrombocytopenia of 19,000.  His hospital course was complicated by AMS with lethargy.  MRI revealed multiple areas concerning for embolic strokes with seziure activity.  Neurology initially felt his AMS and resulting seizure was 2/2 to stroke and started him on Keppra, ASA and stain.  His mental status initially improved however he was found to be HTN with a fever and spent a brief time in the ICU.  Patient was started on ceftriaxone and Vanc given concern for UTI.  After stepping down to the floor patient was once again found to be lethargic and confused.  Neurology wished to pursue LP but due to platelet count of 52,000 they felt uncomfortable due to lack of Neurosurgery and this is the reason for transfer to [facility].  Other PMH significant for poorly controlled HTN and HIV/?AIDS with uncertain compliance.  Patient reportedly initially was Aox4, but when I see him he is not alert, not answering questions, arousable, but barely.     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    Prior to hospital admit in [place] he was receiving HD at [facility] in [place] on MWF through a R sided tunneled access line (reportedly w/o access problems and access placed in May), his nephrologist in [place] is Dr Hammond, treatment duration 4hrs, EDW: 78 kg, Normal UF 1-2 L, he does have residual renal function, uncertain how much, although noted he has made about 600ccs of urine overnight.    He reportedly received dialysis this past Friday, his lytes, acid/base and volume status are stable this morning, he is actually net negative 400ccs overnight, and appears to be below his EDW.      Interval History:   12 hour SLED completed overnight, net negative 1.4L/24h.  Electrolytes stable.  Sitting in bedside chair this morning, oxygenating well on RA.    Review of patient's allergies indicates:  No Known Allergies  Current Facility-Administered Medications   Medication Frequency    acetaminophen oral solution 650 mg Q6H PRN    acetaminophen tablet 500 mg Daily PRN    [START ON 10/4/2017] amlodipine tablet 5 mg Daily    amphotericin B liposome (AMBISOME) 200 mg in dextrose 5 % 200 mL IVPB Q24H    atorvastatin tablet 10 mg QHS    azithromycin tablet 1,200 mg Weekly    hydrALAZINE tablet 100 mg Q8H    levetiracetam tablet 500 mg BID    sulfamethoxazole-trimethoprim 800-160mg per tablet 1 tablet Every Mon, Wed, Fri       Objective:     Vital Signs (Most Recent):  Temp: 98.8 °F (37.1 °C) (10/03/17 1500)  Pulse: 93 (10/03/17 1500)  Resp: 18 (10/03/17 1500)  BP: (!) 147/93 (10/03/17 1500)  SpO2: 98 % (10/03/17 1500)  O2 Device (Oxygen Therapy): room air (10/03/17 1500) Vital Signs (24h Range):  Temp:  [98.5 °F (36.9 °C)-99.8 °F (37.7 °C)] 98.8 °F (37.1 °C)  Pulse:  [] 93  Resp:  [12-29] 18  SpO2:  [96 %-100 %] 98 %  BP: (118-170)/() 147/93     Weight: 68.2 kg (150 lb 5.7 oz) (10/03/17 0300)  Body mass index is 20.39 kg/m².  Body surface area is 1.86 meters squared.    I/O last 3 completed  shifts:  In: 2825 [I.V.:2325; IV Piggyback:500]  Out: 4190 [Other:4190]    Physical Exam   Constitutional: He is oriented to person, place, and time. He appears well-developed and well-nourished.   HENT:   Head: Normocephalic and atraumatic.   Eyes: Pupils are equal, round, and reactive to light.   Neck: Neck supple.   Cardiovascular: Normal rate, regular rhythm and normal heart sounds.  Exam reveals no friction rub.    No murmur heard.  Pulmonary/Chest: Effort normal and breath sounds normal. He has no wheezes. He has no rales.   Abdominal: Soft. Bowel sounds are normal. He exhibits no distension. There is no tenderness. There is no guarding.   Musculoskeletal: He exhibits edema (bilateral hands). He exhibits no deformity.   Neurological: He is alert and oriented to person, place, and time.   Skin: Skin is warm and dry. No rash noted. No erythema.       Significant Labs:  CBC:   Recent Labs  Lab 10/03/17  0311   WBC 2.89*   RBC 2.74*   HGB 8.2*   HCT 24.8*   PLT 62*   MCV 91   MCH 29.9   MCHC 33.1     CMP:   Recent Labs  Lab 10/03/17  0311   GLU 76  76   CALCIUM 7.7*  7.7*   ALBUMIN 1.7*  1.7*     140   K 4.6  4.6   CO2 24  24     108   BUN 9  9   CREATININE 2.3*  2.3*            Assessment/Plan:     ESRD (end stage renal disease)    -Patient is ESRD on HD since May 2017? Underlying etiology uncertain, ?HIV nephropathy, history of non-compliance with HD treatments.    -12 hour SLED overnight, net negative 1.4L/24h.  Electrolytes stable.  -no indication for RRT today.  Will re-evaluate in AM for further needs.          Andrews Garcia NP  Nephrology  [facility]  Pager:  [phone]

## 2017-10-03 NOTE — ASSESSMENT & PLAN NOTE
Nutrition Problem:  Inadequate energy intake    Related to (etiology):   Inability to consume sufficient energy    Signs and Symptoms (as evidenced by):   NPO, no diet advancement at this time.     Interventions/Recommendations (treatment strategy):  Please see RD recs above.    Nutrition Diagnosis Status:   Resolved

## 2017-10-03 NOTE — ASSESSMENT & PLAN NOTE
Case of 51 y/o male with PMHX of HIV currently AIDS not treated at this time with no follow up in out patient to our knowledge. CD4 count is 42.      -Bactrim for PCP prophylaxis.  Monitor potassium.  -Azithromycin 1200 mg once a week for MAC prophylaxis  -Genotype pending.    -hepatitis negative  --needs to follow up with his ID physician in [place] to resume HAART
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
## 2017-10-03 NOTE — PLAN OF CARE
Problem: SLP Goal  Goal: SLP Goal  Speech Language Pathology Goals  Goals expected to be met by 10/9     1. Pt will tolerate diet of thin liquids and purees without overt s/s of aspiration   2. Pt will participate in ongoing assessment of swallow function to help determine least restrictive diet   3. Pt will orient x4 with min cues.  4. Pt will complete recall tasks with 70% accuracy given min-mod cues to increase comprehension.  5. Pt will complete moderate level problem solving tasks with 80% accuracy given min cues to enhance independence with ADLs.  6.  Pt will complete higher level word finding tasks with 90% accuracy, to improve expressive language skills.   7. Pt will participate in reading/writing/visual-spatial eval to rule out further deficits.    Outcome: Ongoing (interventions implemented as appropriate)  Pt seen for ongoing swallowing assessment. Pt appears safe to advance to a dental soft diet, but continue aspiration precautions, including max cues to monitor bite/sip size.  MIR Hopkins, CCC-SLP  Speech Language Pathologist  [phone]  10/3/2017

## 2017-10-03 NOTE — NURSING TRANSFER
Nursing Transfer Note      10/3/2017     Transfer To: 1124 from 7081    Transfer via stretcher    Transfer with cardiac monitoring    Transported by Silvia Harris, PCT    Medicines sent: Dapasone, Labetalol.     Chart send with patient: Yes    Notified: Mother, spouse    Patient reassessed at: 10/3/2017, 1720    UPON ARRIVAL TO FLOOR, patient oriented to room, call bell in reach and bed in lowest position    Meds given to SHELLI Coleman

## 2017-10-04 PROBLEM — J96.01 ACUTE RESPIRATORY FAILURE WITH HYPOXIA AND HYPERCAPNIA: Status: RESOLVED | Noted: 2017-09-28 | Resolved: 2017-10-04

## 2017-10-04 PROBLEM — A53.9 SYPHILIS: Status: ACTIVE | Noted: 2017-10-04

## 2017-10-04 PROBLEM — J96.02 ACUTE RESPIRATORY FAILURE WITH HYPOXIA AND HYPERCAPNIA: Status: RESOLVED | Noted: 2017-09-28 | Resolved: 2017-10-04

## 2017-10-04 LAB
ALBUMIN SERPL BCP-MCNC: 1.5 G/DL
AMYLASE SERPL-CCNC: 197 U/L
ANION GAP SERPL CALC-SCNC: 7 MMOL/L
ANISOCYTOSIS BLD QL SMEAR: SLIGHT
APTT BLDCRRT: 33.1 SEC
BASOPHILS # BLD AUTO: 0.03 K/UL
BASOPHILS NFR BLD: 1.4 %
BUN SERPL-MCNC: 24 MG/DL
CALCIUM SERPL-MCNC: 7.1 MG/DL
CHLORIDE SERPL-SCNC: 107 MMOL/L
CO2 SERPL-SCNC: 24 MMOL/L
CREAT SERPL-MCNC: 5 MG/DL
DIFFERENTIAL METHOD: ABNORMAL
EOSINOPHIL # BLD AUTO: 0 K/UL
EOSINOPHIL NFR BLD: 0 %
ERYTHROCYTE [DISTWIDTH] IN BLOOD BY AUTOMATED COUNT: 17.6 %
EST. GFR  (AFRICAN AMERICAN): 14.1 ML/MIN/1.73 M^2
EST. GFR  (NON AFRICAN AMERICAN): 12.2 ML/MIN/1.73 M^2
GLUCOSE SERPL-MCNC: 80 MG/DL
HCT VFR BLD AUTO: 20 %
HGB BLD-MCNC: 6.4 G/DL
HYPOCHROMIA BLD QL SMEAR: ABNORMAL
INR PPP: 1.2
LIPASE SERPL-CCNC: 322 U/L
LYMPHOCYTES # BLD AUTO: 0.5 K/UL
LYMPHOCYTES NFR BLD: 24.1 %
MAGNESIUM SERPL-MCNC: 1.4 MG/DL
MCH RBC QN AUTO: 29.1 PG
MCHC RBC AUTO-ENTMCNC: 32 G/DL
MCV RBC AUTO: 91 FL
MONOCYTES # BLD AUTO: 0.6 K/UL
MONOCYTES NFR BLD: 25.9 %
NEUTROPHILS # BLD AUTO: 1 K/UL
NEUTROPHILS NFR BLD: 48.6 %
OVALOCYTES BLD QL SMEAR: ABNORMAL
PHOSPHATE SERPL-MCNC: 2.7 MG/DL
PHOSPHATE SERPL-MCNC: 2.8 MG/DL
PLATELET # BLD AUTO: 52 K/UL
PLATELET BLD QL SMEAR: ABNORMAL
PMV BLD AUTO: 10.8 FL
POCT GLUCOSE: 114 MG/DL (ref 70–110)
POIKILOCYTOSIS BLD QL SMEAR: SLIGHT
POLYCHROMASIA BLD QL SMEAR: ABNORMAL
POTASSIUM SERPL-SCNC: 4.7 MMOL/L
PROTHROMBIN TIME: 12.1 SEC
RBC # BLD AUTO: 2.2 M/UL
SODIUM SERPL-SCNC: 138 MMOL/L
WBC # BLD AUTO: 2.16 K/UL

## 2017-10-04 PROCEDURE — 99232 SBSQ HOSP IP/OBS MODERATE 35: CPT | Mod: GC,,, | Performed by: HOSPITALIST

## 2017-10-04 PROCEDURE — 63600175 PHARM REV CODE 636 W HCPCS: Performed by: INTERNAL MEDICINE

## 2017-10-04 PROCEDURE — 25000003 PHARM REV CODE 250: Performed by: INTERNAL MEDICINE

## 2017-10-04 PROCEDURE — 63600175 PHARM REV CODE 636 W HCPCS: Performed by: STUDENT IN AN ORGANIZED HEALTH CARE EDUCATION/TRAINING PROGRAM

## 2017-10-04 PROCEDURE — 82150 ASSAY OF AMYLASE: CPT

## 2017-10-04 PROCEDURE — 99233 SBSQ HOSP IP/OBS HIGH 50: CPT | Mod: GC,,, | Performed by: INTERNAL MEDICINE

## 2017-10-04 PROCEDURE — 83690 ASSAY OF LIPASE: CPT

## 2017-10-04 PROCEDURE — 86780 TREPONEMA PALLIDUM: CPT

## 2017-10-04 PROCEDURE — 85025 COMPLETE CBC W/AUTO DIFF WBC: CPT

## 2017-10-04 PROCEDURE — 92526 ORAL FUNCTION THERAPY: CPT

## 2017-10-04 PROCEDURE — 83735 ASSAY OF MAGNESIUM: CPT

## 2017-10-04 PROCEDURE — 25000003 PHARM REV CODE 250: Performed by: PHYSICIAN ASSISTANT

## 2017-10-04 PROCEDURE — 80069 RENAL FUNCTION PANEL: CPT

## 2017-10-04 PROCEDURE — 11000001 HC ACUTE MED/SURG PRIVATE ROOM

## 2017-10-04 PROCEDURE — 36415 COLL VENOUS BLD VENIPUNCTURE: CPT

## 2017-10-04 PROCEDURE — 25000003 PHARM REV CODE 250: Performed by: PSYCHIATRY & NEUROLOGY

## 2017-10-04 PROCEDURE — 85610 PROTHROMBIN TIME: CPT

## 2017-10-04 PROCEDURE — 97535 SELF CARE MNGMENT TRAINING: CPT

## 2017-10-04 PROCEDURE — 84100 ASSAY OF PHOSPHORUS: CPT

## 2017-10-04 PROCEDURE — 87040 BLOOD CULTURE FOR BACTERIA: CPT

## 2017-10-04 PROCEDURE — 85730 THROMBOPLASTIN TIME PARTIAL: CPT

## 2017-10-04 PROCEDURE — 25000003 PHARM REV CODE 250: Performed by: NURSE PRACTITIONER

## 2017-10-04 RX ORDER — LOPINAVIR AND RITONAVIR 200; 50 MG/1; MG/1
2 TABLET, FILM COATED ORAL 2 TIMES DAILY
Status: DISCONTINUED | OUTPATIENT
Start: 2017-10-05 | End: 2017-10-05

## 2017-10-04 RX ORDER — SODIUM CHLORIDE 9 MG/ML
INJECTION, SOLUTION INTRAVENOUS
Status: DISCONTINUED | OUTPATIENT
Start: 2017-10-04 | End: 2017-10-05 | Stop reason: HOSPADM

## 2017-10-04 RX ORDER — LAMIVUDINE AND ZIDOVUDINE 150; 300 MG/1; MG/1
1 TABLET, FILM COATED ORAL DAILY
Status: DISCONTINUED | OUTPATIENT
Start: 2017-10-05 | End: 2017-10-05

## 2017-10-04 RX ORDER — SODIUM CHLORIDE 9 MG/ML
INJECTION, SOLUTION INTRAVENOUS ONCE
Status: DISCONTINUED | OUTPATIENT
Start: 2017-10-04 | End: 2017-10-05

## 2017-10-04 RX ADMIN — LEVETIRACETAM 500 MG: 500 TABLET ORAL at 09:10

## 2017-10-04 RX ADMIN — AMLODIPINE BESYLATE 5 MG: 5 TABLET ORAL at 10:10

## 2017-10-04 RX ADMIN — ACETAMINOPHEN 650 MG: 650 SOLUTION ORAL at 05:10

## 2017-10-04 RX ADMIN — HYDRALAZINE HYDROCHLORIDE 100 MG: 50 TABLET ORAL at 05:10

## 2017-10-04 RX ADMIN — HYDRALAZINE HYDROCHLORIDE 100 MG: 50 TABLET ORAL at 09:10

## 2017-10-04 RX ADMIN — PENICILLIN G BENZATHINE 2.4 MILLION UNITS: 1200000 INJECTION, SUSPENSION INTRAMUSCULAR at 05:10

## 2017-10-04 RX ADMIN — ATORVASTATIN CALCIUM 10 MG: 10 TABLET, FILM COATED ORAL at 09:10

## 2017-10-04 RX ADMIN — LEVETIRACETAM 500 MG: 500 TABLET ORAL at 10:10

## 2017-10-04 RX ADMIN — ACETAMINOPHEN 650 MG: 650 SOLUTION ORAL at 09:10

## 2017-10-04 RX ADMIN — AMPHOTERICIN B 200 MG: 50 INJECTION, POWDER, LYOPHILIZED, FOR SOLUTION INTRAVENOUS at 11:10

## 2017-10-04 NOTE — ASSESSMENT & PLAN NOTE
Patient unaware of any prior diagnosis of syphilus or treatment for syphilus in the past.  Patient with negative RPR and minimally reactive FTA-ABS. Therefore recommend sending treponemal antibody test as confirmatory testing. Also recommend penicillin IM 2.4 million units x 1. Pending antibody result will determine duration of treatment.

## 2017-10-04 NOTE — PLAN OF CARE
SW received call from Chelsie with [facility] ([phone]). She asked for the RN station for Pt new room so she can check in with his RN today. Advised he will also have a new SW that will call her when she has more information about his discharge plan and date.    Sridevi Spears LMSW  Neurocritical Care   [facility]  [id number]

## 2017-10-04 NOTE — PLAN OF CARE
SUZE met with pt, informed him that referral was sent to [facility] in [place]. Pt confirmed that his wife and mother had information on transportation and were looking into it. Pt said SW has permission to speak with either his wife or mother regarding d/c.    SUZE spoke with Chelsie at [facility]. They are waiting on insurance approval. Abdirahmanjadiel requested MRI of the brain and imaging was sent via [facility].    Elaina Lamar, [facility] [id number]

## 2017-10-04 NOTE — PROGRESS NOTES
[facility] Hospital Medicine  Progress Note    Patient Name: Volodymyr Pope  MRN: [id number]  Patient Class: IP- Inpatient   Admission Date: 9/23/2017  Length of Stay: 11 days  Attending Physician: Brian Vasquez, *  Primary Care Provider: [facility] Medicine Team: [facility] MED 4 Armaan Jain MD    Subjective:     Principal Problem:Status epilepticus    HPI:  53 yearold male with AIDS (CD 49 not on ART or PPx), ESRD on HD (M,W,F), and seizure disorder presents as a transfer from [facility] for Neurology eval and LP.  Patient presented to OSH on 9/15 from a dialysis center for HTN.  He was admitted for HTN urgency and routine workup revealed thrombocytopenia of 19,000.  His hospital course was complicated by AMS with lethargy.  MRI revealed multiple areas concerning for ischemic stroke/ seizure.  Neurology initially felt his AMS and resulting seizure was 2/2 to stroke and started him on Keppra, ASA and stain.  His mental status initially improved however he was found to be HTN with a fever and spent a brief time in the ICU.  Patient was started on ceftriaxone and Vanc given concern for UTI.  After stepping down to the floor patient was once again found to be lethargic and confused.  Neurology wished to pursue LP but due to plantlet count of 52,000 they felt uncomfortable due to lack of Neurosurgery.  On evaluation patient is alert and oriented x 4.  He answers most questions appropriately but does appear to be forgetful with possible confabulation.  He reports he is doing great and attributes his confusion due to seizure with he reports improved on Keppra.  He otherwise reports he feels in his normal state of health and is anxious regarding LP.  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  He lives in [place]  "with his wife but moved to [place] for work.  He was presenting for HD and felt great prior to admission to the hospital.  He reports a past medical history of HIV first diagnosed 4 years ago in addition to ESRD due to poor HTN control.  Reports tunnel cath placed in May, however OSH records unclear if changed.  Reports he is able to attend to all of his ADLs independently but does not some fine motor skill defect in his bilateral hands attributed to a neck injury "years" ago.  Regarding his HIV he reports he was on therapy but does not remember what or when he stopped taking.  Is unaware of he was on Abx ppx.  His wife is aware of his HIV and he reports silvina it during a sexual encounter.  He is a never smoker, social drinker who denies drug use.                      Hospital Course:  No notes on file    Review of Systems   Constitutional: Positive for fever. Negative for chills.   Eyes: Negative for photophobia and visual disturbance.   Respiratory: Positive for cough. Negative for chest tightness, shortness of breath and wheezing.    Cardiovascular: Negative for leg swelling.   Gastrointestinal: Negative for abdominal distention and abdominal pain.   Skin: Negative for rash.   Psychiatric/Behavioral: Negative for agitation and confusion.             Vitals:    10/04/17 0804 10/04/17 1100 10/04/17 1124 10/04/17 1500   BP: 127/82  (!) 133/93    BP Location:   Right arm    Patient Position:   Lying    Pulse: 89 92 88 84   Resp: 16  18    Temp: 98.5 °F (36.9 °C)  98.6 °F (37 °C)    TempSrc:   Oral    SpO2: (!) 94%  97%    Weight:       Height:         Physical Exam   Constitutional: He is oriented to person, place, and time. He appears well-developed and well-nourished.   HENT:   Head: Normocephalic and atraumatic.   Eyes: Conjunctivae are normal. No scleral icterus.   Cardiovascular: Regular rhythm.    No murmur heard.  Pulmonary/Chest: Effort normal. No respiratory distress. He has no wheezes.   Cough, " productive   Abdominal: Soft. He exhibits no distension. There is no tenderness. There is no guarding.   Lymphadenopathy:     He has no cervical adenopathy.   Neurological: He is alert and oriented to person, place, and time.   Skin: Skin is warm and dry.   Vitals reviewed.    Recent Results (from the past 24 hour(s))   Renal function panel    Collection Time: 10/04/17  5:32 AM   Result Value Ref Range    Glucose 80 70 - 110 mg/dL    Sodium 138 136 - 145 mmol/L    Potassium 4.7 3.5 - 5.1 mmol/L    Chloride 107 95 - 110 mmol/L    CO2 24 23 - 29 mmol/L    BUN, Bld 24 (H) 6 - 20 mg/dL    Calcium 7.1 (L) 8.7 - 10.5 mg/dL    Creatinine 5.0 (H) 0.5 - 1.4 mg/dL    Albumin 1.5 (L) 3.5 - 5.2 g/dL    Phosphorus 2.7 2.7 - 4.5 mg/dL    eGFR if  14.1 (A) >60 mL/min/1.73 m^2    eGFR if non  12.2 (A) >60 mL/min/1.73 m^2    Anion Gap 7 (L) 8 - 16 mmol/L   CBC auto differential    Collection Time: 10/04/17  5:32 AM   Result Value Ref Range    WBC 2.16 (L) 3.90 - 12.70 K/uL    RBC 2.20 (L) 4.60 - 6.20 M/uL    Hemoglobin 6.4 (L) 14.0 - 18.0 g/dL    Hematocrit 20.0 (L) 40.0 - 54.0 %    MCV 91 82 - 98 fL    MCH 29.1 27.0 - 31.0 pg    MCHC 32.0 32.0 - 36.0 g/dL    RDW 17.6 (H) 11.5 - 14.5 %    Platelets 52 (L) 150 - 350 K/uL    MPV 10.8 9.2 - 12.9 fL    Gran # 1.0 (L) 1.8 - 7.7 K/uL    Lymph # 0.5 (L) 1.0 - 4.8 K/uL    Mono # 0.6 0.3 - 1.0 K/uL    Eos # 0.0 0.0 - 0.5 K/uL    Baso # 0.03 0.00 - 0.20 K/uL    Gran% 48.6 38.0 - 73.0 %    Lymph% 24.1 18.0 - 48.0 %    Mono% 25.9 (H) 4.0 - 15.0 %    Eosinophil% 0.0 0.0 - 8.0 %    Basophil% 1.4 0.0 - 1.9 %    Platelet Estimate Decreased (A)     Aniso Slight     Poik Slight     Poly Occasional     Hypo Occasional     Ovalocytes Occasional     Differential Method Automated    Protime-INR    Collection Time: 10/04/17  5:32 AM   Result Value Ref Range    Prothrombin Time 12.1 9.0 - 12.5 sec    INR 1.2 0.8 - 1.2   APTT    Collection Time: 10/04/17  5:32 AM   Result  Value Ref Range    aPTT 33.1 (H) 21.0 - 32.0 sec   Amylase    Collection Time: 10/04/17  5:32 AM   Result Value Ref Range    Amylase 197 (H) 20 - 110 U/L   Lipase    Collection Time: 10/04/17  5:32 AM   Result Value Ref Range    Lipase 322 (H) 4 - 60 U/L   Phosphorus    Collection Time: 10/04/17  5:32 AM   Result Value Ref Range    Phosphorus 2.8 2.7 - 4.5 mg/dL   Magnesium    Collection Time: 10/04/17  5:32 AM   Result Value Ref Range    Magnesium 1.4 (L) 1.6 - 2.6 mg/dL     Microbiology Results (last 7 days)     Procedure Component Value Units Date/Time    Blood culture [[id number]] Collected:  10/04/17 1225    Order Status:  Sent Specimen:  Blood Updated:  10/04/17 1304    Narrative:       Collection has been rescheduled by TRM at 10/4/2017 10:55 Reason: drs   in room. will return   Collection has been rescheduled by TRM at 10/4/2017 10:55 Reason: drs   in room. will return     Blood culture [[id number]] Collected:  10/04/17 1231    Order Status:  Sent Specimen:  Blood Updated:  10/04/17 1304    Narrative:       Collection has been rescheduled by TRM at 10/4/2017 10:55 Reason: drs   in room. will return   Collection has been rescheduled by TRM at 10/4/2017 10:55 Reason: drs   in room. will return     Urine culture [[id number]]     Order Status:  No result Specimen:  Urine     Blood culture [[id number]] Collected:  09/28/17 1030    Order Status:  Completed Specimen:  Blood from Line, Subclavian, Right Updated:  10/03/17 1412     Blood Culture, Routine No growth after 5 days.    Narrative:       Please draw from R tunneled catheter    Fungus Culture, Blood or Bone Marrow [[id number]] Collected:  09/26/17 0611    Order Status:  Completed Specimen:  Blood from Arm, Left Updated:  10/02/17 1406     Fungus Cult, blood or BM Culture in progress    Blood culture [[id number]] Collected:  09/27/17 0617    Order Status:  Completed Specimen:  Blood from Peripheral, Antecubital, Right Updated:  10/02/17 1012     Blood Culture,  Routine No growth after 5 days.    Narrative:       Blood cultures from 2 different sites. 4 bottles total.  Please draw before starting antibiotics.    Blood culture [[id number]] Collected:  09/27/17 0625    Order Status:  Completed Specimen:  Blood from Peripheral, Foot, Left Updated:  10/02/17 1012     Blood Culture, Routine No growth after 5 days.    Narrative:       Blood cultures x 2 different sites. 4 bottles total. Please  draw cultures before administering antibiotics.    CSF culture [[id number]] Collected:  09/26/17 1442    Order Status:  Completed Specimen:  CSF (Spinal Fluid) from CSF Tap, Tube 3 Updated:  10/01/17 0708     CSF CULTURE No Growth     Gram Stain Result Cytospin indicates:      No WBC's      No organisms seen    Culture, Respiratory with Gram Stain [[id number]] Collected:  09/27/17 0709    Order Status:  Completed Specimen:  Respiratory from Endotracheal Aspirate Updated:  09/29/17 1110     Respiratory Culture Normal respiratory brittany     Gram Stain (Respiratory) <10 epithelial cells per low power field.     Gram Stain (Respiratory) Many WBC's     Gram Stain (Respiratory) No organisms seen    Narrative:       Mini-BAL.    Blood culture [[id number]] Collected:  09/24/17 0013    Order Status:  Completed Specimen:  Blood Updated:  09/29/17 0612     Blood Culture, Routine No growth after 5 days.    Narrative:       OBTAIN from Central line please    Blood culture [[id number]] Collected:  09/23/17 2122    Order Status:  Completed Specimen:  Blood Updated:  09/28/17 2312     Blood Culture, Routine No growth after 5 days.    Narrative:       Collection has been rescheduled by RNS at 9/23/2017 20:40 Reason: dr. garza in.  Collection has been rescheduled by RNS at 9/23/2017 20:48 Reason: dr.   still with pt.  Collection has been rescheduled by RNS at 9/23/2017 20:40 Reason: dr. garza in.  Collection has been rescheduled by RNS at 9/23/2017 20:48 Reason: dr.   still with pt.    Blood culture [[id number]]  Collected:  09/23/17 2039    Order Status:  Completed Specimen:  Blood Updated:  09/28/17 2312     Blood Culture, Routine No growth after 5 days.    Blood culture [[id number]]     Order Status:  Canceled Specimen:  Blood     Blood culture [[id number]]     Order Status:  Canceled Specimen:  Blood     Culture, Respiratory with Gram Stain [[id number]]     Order Status:  Canceled Specimen:  Respiratory     AFB Culture & Smear [[id number]] Collected:  09/26/17 1442    Order Status:  Completed Specimen:  CSF (Spinal Fluid) from CSF Tap, Tube 3 Updated:  09/27/17 2127     AFB Culture & Smear Culture in progress     AFB CULTURE STAIN No acid fast bacilli seen.          Assessment/Plan:      * Status epilepticus    - Continue Keppra 1g BID          Syphilis    - Patient with negative RPR and minimally reactive FTA-ABS.  - Will send treponemal antibody test as confirmatory testing.   - Per ID: penicillin IM 2.4 million units x 1. Pending antibody result will determine duration of treatment.          Encephalopathy    -MRI with evidence of cerebritis  -initially intubated 2/2 encephalopathic state, greatly improved since admission, GCS 15, tolerating pureed diet, sitting in bedside chair, stable to TTF  - HIV encephalitis   - Urinary Histo antigen negative. Will D/C amphotericin b  - Per ID: will restart patient's prior HAART regimen - Kaletra and combivir.           Seizure    -Continue Keppra 500 mg PO BID  -No seizures on EEG  -EEG shows encephalopathy only          ESRD (end stage renal disease)    - Patient is on dialysis every M W F  - Nephrology consulted and following   - Patient received SLED over night 1.4L/24h.  Electrolytes stable.  - Per Nephro - no indication for RRT today.  Will re-evaluate in AM for further needs.        Essential hypertension    - Previous history of poorly controlled resulting in ESRD  - OSH noted fluctuations and was previously on Hydralzine 100 Q8  - Continue hydralazine 100 Q8.           AIDS     - Per OSH records previous CD4 count 49, patient reports diagnosis 4 years ago  - Histo antigen negative.  - Continue Azithromycin 1200 mg Weekly.  - Bactrim 800-160mg 1 tablet every Mon Wed Fri.   - Per ID: Will start Combivir and Kaletra. Combivir will need to be renally dosed prior to starting.               Acute encephalopathy    - History unclear in OSH chart, Presented someone altered, unknown baseline, got worst in hospital s/p seizure with waxing and waning   - Patient notes clumsiness and trouble with fine motor skills at baseline 2/2 to neck injury years ago   - LP deferred due to thrombocytopenia.   - MRI with evidence of cerebritis  -initially intubated 2/2 encephalopathic state, greatly improved since admission, GCS 15, tolerating pureed diet, sitting in bedside chair, stable to TTF  - Now suspecting HIV encephalitis   - Histo antigen negative. Will d/c amphotericin.   - Per ID: will start Combivir and Kaletra                  Thrombocytopenia    - Platelet count noted to be low at OSH 19 on admit, attributed to AIDS  - Given transfusion at OSH with good response  - Will continue to monitor and transfuse at < 12 or < 50 if bleeding             VTE Risk Mitigation         Ordered     Place sequential compression device  Until discontinued      09/23/17 2023     Medium Risk of VTE  Once      09/23/17 2023              Armaan Jain MD PGY-1   Department of Hospital Medicine   [facility]

## 2017-10-04 NOTE — PLAN OF CARE
Problem: Patient Care Overview  Goal: Plan of Care Review  Outcome: Ongoing (interventions implemented as appropriate)  Pt free from any injury or falls, complained of no pain or discomfort. Pt had elevated temperature throughout the night, cool wet rags given for comfort as well as PRN tylenol with full relief obtained. Able to turn his self to prevent any skin breakdown. Mother remained at bedside all night. Will continue to monitor.

## 2017-10-04 NOTE — ASSESSMENT & PLAN NOTE
- Patient is on dialysis every M W F  - Nephrology consulted and following   - Patient received SLED over night 1.4L/24h.  Electrolytes stable.  - Per Nephro - no indication for RRT today.  Will re-evaluate in AM for further needs.

## 2017-10-04 NOTE — ASSESSMENT & PLAN NOTE
- Per OSH records previous CD4 count 49, patient reports diagnosis 4 years ago  - Histo antigen negative.  - Continue Azithromycin 1200 mg Weekly.  - Bactrim 800-160mg 1 tablet every Mon Wed Fri.   - Per ID: Will start Combivir and Kaletra. Combivir will need to be renally dosed prior to starting.

## 2017-10-04 NOTE — ASSESSMENT & PLAN NOTE
-MRI with evidence of cerebritis  -initially intubated 2/2 encephalopathic state, greatly improved since admission, GCS 15, tolerating pureed diet, sitting in bedside chair, stable to TTF  - HIV encephalitis   - Urinary Histo antigen negative. Will D/C amphotericin b  - Per ID: will restart patient's prior HAART regimen - Kaletra and combivir.

## 2017-10-04 NOTE — PROGRESS NOTES
Cold rags applied to pt for elevated temperature. Will recheck temp in 30 minutes and given another dose of tylenol if needed.

## 2017-10-04 NOTE — PT/OT/SLP PROGRESS
"Speech Language Pathology  Treatment/ Discharge     Volodymyr Pope   MRN: [id number]   Admitting Diagnosis: Status epilepticus    Diet recommendations: Solid Diet Level: Regular  Liquid Diet Level: Thin Feed only when awake/alert, HOB to 90 degrees, 1 bite/sip at a time and Meds whole 1 at a time    SLP Treatment Date: 10/04/17  Speech Start Time: 0911     Speech Stop Time: 0934     Speech Total (min): 23 min       TREATMENT BILLABLE MINUTES:  Speech Therapy Individual 10 and Treatment Swallowing Dysfunction 13    Has the patient been evaluated by SLP for swallowing? : Yes  Keep patient NPO?: No   General Precautions: Standard, aspiration, fall          Subjective:  "I am doing so much better, I just don't remember all of that" Pt referring to previously meeting SLP     Pain/Comfort  Pain Rating 1: 0/10  Pain Rating Post-Intervention 1: 0/10    Objective:     Pt awake alert and cooperative. Pt independently oriented x4. Pt completed word finding tasks independently with 100% acc. Pt readily engaging in conversation spontaneously and appropriately. Pt completed problem solving tasks independently without difficulty. Pt mother present at the bedside and feels Pt near his baseline. Pt reporting he feels much improved and no longer "foggy." Discussed with Pt and Pt mother should there be new additional as team for new eval/treat orders if still admitted and/or consider out patient evaluation and treatment post discharge as Pt no longer appearing appropriate for skilled speech services having met and surpassed all therapeutic goals.     Pt advanced to dental soft solids previous therapy session. This date Pt demonstrated adequate ability to manage regular solids independently without evidence of oral residue and drink consecutive sips of thin liquids via straw. Pt appearing safe to advance to baseline regular diet. No further skilled speech services warranted.     Assessment:  Volodymyr Pope is a 52 y.o. male with a medical " diagnosis of Status epilepticus and presents with near baseline oral feeding and cognitive linguistic status. No further skilled speech services warranted at this time.     Discharge recommendations: Discharge Facility/Level Of Care Needs: outpatient speech therapy (as needed )     Goals:    SLP Goals     Not on file          Multidisciplinary Problems (Resolved)        Problem: SLP Goal    Goal Priority Disciplines Outcome   SLP Goal   (Resolved)     SLP Outcome(s) achieved   Description:  Speech Language Pathology Goals  Goals expected to be met by 10/9     1. Pt will tolerate diet of thin liquids and purees without overt s/s of aspiration - goal met 10/3, advanced to dental soft  2. Pt will participate in ongoing assessment of swallow function to help determine least restrictive diet   3. Pt will orient x4 with min cues.  4. Pt will complete recall tasks with 70% accuracy given min-mod cues to increase comprehension.  5. Pt will complete moderate level problem solving tasks with 80% accuracy given min cues to enhance independence with ADLs.  6.  Pt will complete higher level word finding tasks with 90% accuracy, to improve expressive language skills.   7. Pt will participate in reading/writing/visual-spatial eval to rule out further deficits.                       Plan:   Patient to be seen Therapy Frequency: 5 x/week   Plan of Care expires: 10/29/17  Plan of Care reviewed with: patient  SLP Follow-up?: No              Janki Chu CCC-SLP  10/04/2017

## 2017-10-04 NOTE — SUBJECTIVE & OBJECTIVE
Interval History: Patient febrile to 102 overnight. No new complaints this Am. Mother with questions regarding disposition.    Review of Systems   Constitutional: Positive for fever. Negative for chills.   Eyes: Negative for photophobia and visual disturbance.   Respiratory: Positive for cough. Negative for chest tightness, shortness of breath and wheezing.    Cardiovascular: Negative for leg swelling.   Gastrointestinal: Negative for abdominal distention and abdominal pain.   Skin: Negative for rash.   Psychiatric/Behavioral: Negative for agitation and confusion.     Objective:     Vital Signs (Most Recent):  Temp: 98.6 °F (37 °C) (10/04/17 1124)  Pulse: 88 (10/04/17 1124)  Resp: 18 (10/04/17 1124)  BP: (!) 133/93 (10/04/17 1124)  SpO2: 97 % (10/04/17 1124) Vital Signs (24h Range):  Temp:  [98.5 °F (36.9 °C)-102 °F (38.9 °C)] 98.6 °F (37 °C)  Pulse:  [] 88  Resp:  [15-22] 18  SpO2:  [94 %-99 %] 97 %  BP: (111-147)/(76-96) 133/93     Weight: 68.2 kg (150 lb 5.7 oz)  Body mass index is 20.39 kg/m².    Estimated Creatinine Clearance: 16.5 mL/min (based on SCr of 5 mg/dL (H)).    Physical Exam   Constitutional: He is oriented to person, place, and time. He appears well-developed and well-nourished.   HENT:   Head: Normocephalic and atraumatic.   Eyes: Conjunctivae are normal. No scleral icterus.   Cardiovascular: Regular rhythm.    No murmur heard.  Pulmonary/Chest: Effort normal. No respiratory distress. He has no wheezes.   Cough, productive   Abdominal: Soft. He exhibits no distension. There is no tenderness. There is no guarding.   Lymphadenopathy:     He has no cervical adenopathy.   Neurological: He is alert and oriented to person, place, and time.   Skin: Skin is warm and dry.   Vitals reviewed.      Significant Labs:   Blood Culture:   Recent Labs  Lab 09/23/17  2122 09/24/17  0013 09/27/17  0617 09/27/17  0625 09/28/17  1030   LABBLOO No growth after 5 days. No growth after 5 days. No growth after 5  days. No growth after 5 days. No growth after 5 days.     BMP:   Recent Labs  Lab 10/04/17  0532   GLU 80      K 4.7      CO2 24   BUN 24*   CREATININE 5.0*   CALCIUM 7.1*   MG 1.4*     CBC:   Recent Labs  Lab 10/03/17  0311 10/04/17  0532   WBC 2.89* 2.16*   HGB 8.2* 6.4*   HCT 24.8* 20.0*   PLT 62* 52*     CSF:   Recent Labs  Lab 09/26/17  1442   CSFCULTURE No Growth     HIV 1/2 Antibodies: No results for input(s): [id number] in the last 48 hours.    Significant Imaging: I have reviewed all pertinent imaging results/findings within the past 24 hours.

## 2017-10-04 NOTE — PT/OT/SLP PROGRESS
Occupational Therapy      Volodymyr Pope  MRN: [id number]    Patient not seen today secondary to Patient unwilling to participate. Pt reported that he had just eaten and was unwilling to participate at this time.  OT unable to return later in PM.  Will follow up when next scheduled.      SHAILESH Fine  10/4/2017
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
## 2017-10-04 NOTE — PLAN OF CARE
Problem: SLP Goal  Goal: SLP Goal  Speech Language Pathology Goals  Goals expected to be met by 10/9     1. Pt will tolerate diet of thin liquids and purees without overt s/s of aspiration - goal met 10/3, advanced to dental soft  2. Pt will participate in ongoing assessment of swallow function to help determine least restrictive diet   3. Pt will orient x4 with min cues.  4. Pt will complete recall tasks with 70% accuracy given min-mod cues to increase comprehension.  5. Pt will complete moderate level problem solving tasks with 80% accuracy given min cues to enhance independence with ADLs.  6.  Pt will complete higher level word finding tasks with 90% accuracy, to improve expressive language skills.   7. Pt will participate in reading/writing/visual-spatial eval to rule out further deficits.       Pt with good progress towards goals. Pt no longer warranting speech services at this time. Pt appears near cognitive baseline Pt appears safe to thin liquids and regular solids.     Janki Chu MS, CCC-SLP  Speech Language Pathologist  Pager: [phone]  Date 10/4/2017

## 2017-10-04 NOTE — PROGRESS NOTES
Cold rags applied to pt for elevated temperature. Will recheck temp in 30 minutes and given another dose of tyelenol if needed.

## 2017-10-04 NOTE — ASSESSMENT & PLAN NOTE
53 y/o male with PMHX of HIV currently AIDS not currently on HAART therapy. Patient has had difficulty with medication compliance as his work requires him to travel sometimes for months at a time which makes it difficult for him to get refills on his HAART medications.    -c/w Bactrim for PCP prophylaxis, monitor potassium.  -Azithromycin 1200 mg once a week for MAC prophylaxis  -d/c amphotericin as histo urinary antigen is negative  -restart on patient's prior regimen- combivir 1 tab daily and kaletra 2 tabs bid; patient wanting to return to [place] therefore he will need to follow-up with his ID physician there; recommend  help with arrangement to prevent patient from falling through and d/c HAART therapy again as this will increase resistance

## 2017-10-05 VITALS
BODY MASS INDEX: 20.37 KG/M2 | HEIGHT: 72 IN | DIASTOLIC BLOOD PRESSURE: 102 MMHG | HEART RATE: 105 BPM | OXYGEN SATURATION: 94 % | WEIGHT: 150.38 LBS | RESPIRATION RATE: 16 BRPM | SYSTOLIC BLOOD PRESSURE: 168 MMHG | TEMPERATURE: 99 F

## 2017-10-05 PROBLEM — G40.901 STATUS EPILEPTICUS: Status: RESOLVED | Noted: 2017-09-24 | Resolved: 2017-10-05

## 2017-10-05 LAB
ALBUMIN SERPL BCP-MCNC: 1.6 G/DL
AMYLASE SERPL-CCNC: 213 U/L
ANION GAP SERPL CALC-SCNC: 9 MMOL/L
ANISOCYTOSIS BLD QL SMEAR: SLIGHT
APTT BLDCRRT: 31.3 SEC
BASOPHILS # BLD AUTO: 0.01 K/UL
BASOPHILS NFR BLD: 0.4 %
BUN SERPL-MCNC: 39 MG/DL
CALCIUM SERPL-MCNC: 7.4 MG/DL
CHLORIDE SERPL-SCNC: 103 MMOL/L
CO2 SERPL-SCNC: 23 MMOL/L
CREAT SERPL-MCNC: 7.2 MG/DL
DIFFERENTIAL METHOD: ABNORMAL
DOLUTEGRAVIR ISLT GENOTYP: NORMAL
ELVITEGRAVIR ISLT GENOTYP: NORMAL
EOSINOPHIL # BLD AUTO: 0 K/UL
EOSINOPHIL NFR BLD: 1.7 %
ERYTHROCYTE [DISTWIDTH] IN BLOOD BY AUTOMATED COUNT: 17.3 %
EST. GFR  (AFRICAN AMERICAN): 9.1 ML/MIN/1.73 M^2
EST. GFR  (NON AFRICAN AMERICAN): 7.9 ML/MIN/1.73 M^2
GLUCOSE SERPL-MCNC: 76 MG/DL
HCT VFR BLD AUTO: 22.2 %
HGB BLD-MCNC: 7.3 G/DL
HIV-1 GENOTYPIC INTEGRASE RESIST.: NORMAL
HYPOCHROMIA BLD QL SMEAR: ABNORMAL
INR PPP: 1.1
INTEGRASE MUTATIONS: NORMAL
LIPASE SERPL-CCNC: 358 U/L
LYMPHOCYTES # BLD AUTO: 0.5 K/UL
LYMPHOCYTES NFR BLD: 22.7 %
MCH RBC QN AUTO: 29.4 PG
MCHC RBC AUTO-ENTMCNC: 32.9 G/DL
MCV RBC AUTO: 90 FL
MONOCYTES # BLD AUTO: 0.5 K/UL
MONOCYTES NFR BLD: 22.3 %
NEUTROPHILS # BLD AUTO: 1.2 K/UL
NEUTROPHILS NFR BLD: 52.9 %
OVALOCYTES BLD QL SMEAR: ABNORMAL
PHOSPHATE SERPL-MCNC: 3.8 MG/DL
PLATELET # BLD AUTO: 61 K/UL
PMV BLD AUTO: ABNORMAL FL
POIKILOCYTOSIS BLD QL SMEAR: SLIGHT
POLYCHROMASIA BLD QL SMEAR: ABNORMAL
POTASSIUM SERPL-SCNC: 5.1 MMOL/L
PROTHROMBIN TIME: 11.8 SEC
RALTEGRAVIR ISLT GENOTYP: NORMAL
RBC # BLD AUTO: 2.48 M/UL
SODIUM SERPL-SCNC: 135 MMOL/L
T PALLIDUM IGG SER QL IA: NEGATIVE
WBC # BLD AUTO: 2.33 K/UL

## 2017-10-05 PROCEDURE — 97110 THERAPEUTIC EXERCISES: CPT

## 2017-10-05 PROCEDURE — 25000003 PHARM REV CODE 250: Performed by: PSYCHIATRY & NEUROLOGY

## 2017-10-05 PROCEDURE — 80069 RENAL FUNCTION PANEL: CPT

## 2017-10-05 PROCEDURE — 85025 COMPLETE CBC W/AUTO DIFF WBC: CPT

## 2017-10-05 PROCEDURE — 25000003 PHARM REV CODE 250: Performed by: HOSPITALIST

## 2017-10-05 PROCEDURE — 25000003 PHARM REV CODE 250: Performed by: PHYSICIAN ASSISTANT

## 2017-10-05 PROCEDURE — 83690 ASSAY OF LIPASE: CPT

## 2017-10-05 PROCEDURE — 63600175 PHARM REV CODE 636 W HCPCS: Performed by: INTERNAL MEDICINE

## 2017-10-05 PROCEDURE — 25000003 PHARM REV CODE 250: Performed by: STUDENT IN AN ORGANIZED HEALTH CARE EDUCATION/TRAINING PROGRAM

## 2017-10-05 PROCEDURE — 90935 HEMODIALYSIS ONE EVALUATION: CPT | Mod: ,,, | Performed by: INTERNAL MEDICINE

## 2017-10-05 PROCEDURE — 85730 THROMBOPLASTIN TIME PARTIAL: CPT

## 2017-10-05 PROCEDURE — 99239 HOSP IP/OBS DSCHRG MGMT >30: CPT | Mod: GC,,, | Performed by: HOSPITALIST

## 2017-10-05 PROCEDURE — 25000003 PHARM REV CODE 250: Performed by: NURSE PRACTITIONER

## 2017-10-05 PROCEDURE — 80100016 HC MAINTENANCE HEMODIALYSIS

## 2017-10-05 PROCEDURE — 99233 SBSQ HOSP IP/OBS HIGH 50: CPT | Mod: ,,, | Performed by: INTERNAL MEDICINE

## 2017-10-05 PROCEDURE — 36415 COLL VENOUS BLD VENIPUNCTURE: CPT

## 2017-10-05 PROCEDURE — 85610 PROTHROMBIN TIME: CPT

## 2017-10-05 PROCEDURE — 25000003 PHARM REV CODE 250: Performed by: INTERNAL MEDICINE

## 2017-10-05 PROCEDURE — 82150 ASSAY OF AMYLASE: CPT

## 2017-10-05 RX ORDER — ATORVASTATIN CALCIUM 10 MG/1
10 TABLET, FILM COATED ORAL NIGHTLY
Qty: 90 TABLET | Refills: 1
Start: 2017-10-05 | End: 2018-10-05

## 2017-10-05 RX ORDER — LAMIVUDINE 10 MG/ML
25 SOLUTION ORAL DAILY
Status: DISCONTINUED | OUTPATIENT
Start: 2017-10-06 | End: 2017-10-05

## 2017-10-05 RX ORDER — LOPINAVIR AND RITONAVIR 200; 50 MG/1; MG/1
2 TABLET, FILM COATED ORAL 2 TIMES DAILY
Status: DISCONTINUED | OUTPATIENT
Start: 2017-10-05 | End: 2017-10-05 | Stop reason: HOSPADM

## 2017-10-05 RX ORDER — ZIDOVUDINE 100 MG/1
300 CAPSULE ORAL DAILY
Status: DISCONTINUED | OUTPATIENT
Start: 2017-10-05 | End: 2017-10-05

## 2017-10-05 RX ORDER — ZIDOVUDINE 300 MG/1
300 TABLET ORAL DAILY
Qty: 30 TABLET | Refills: 1
Start: 2017-10-05 | End: 2018-10-05

## 2017-10-05 RX ORDER — ONDANSETRON 2 MG/ML
8 INJECTION INTRAMUSCULAR; INTRAVENOUS ONCE
Status: COMPLETED | OUTPATIENT
Start: 2017-10-05 | End: 2017-10-05

## 2017-10-05 RX ORDER — HEPARIN SODIUM 1000 [USP'U]/ML
1000 INJECTION, SOLUTION INTRAVENOUS; SUBCUTANEOUS
Status: DISCONTINUED | OUTPATIENT
Start: 2017-10-05 | End: 2017-10-05 | Stop reason: HOSPADM

## 2017-10-05 RX ORDER — LOPINAVIR AND RITONAVIR 200; 50 MG/1; MG/1
2 TABLET, FILM COATED ORAL 2 TIMES DAILY
Qty: 120 TABLET | Refills: 0
Start: 2017-10-05 | End: 2018-10-05

## 2017-10-05 RX ORDER — LOPINAVIR AND RITONAVIR 200; 50 MG/1; MG/1
2 TABLET, FILM COATED ORAL 2 TIMES DAILY
Status: DISCONTINUED | OUTPATIENT
Start: 2017-10-06 | End: 2017-10-05

## 2017-10-05 RX ORDER — AMLODIPINE BESYLATE 5 MG/1
5 TABLET ORAL DAILY
Qty: 30 TABLET | Refills: 1
Start: 2017-10-06 | End: 2018-10-06

## 2017-10-05 RX ORDER — ZIDOVUDINE 300 MG/1
300 TABLET ORAL DAILY
Qty: 30 TABLET | Refills: 0 | Status: SHIPPED | OUTPATIENT
Start: 2017-10-05 | End: 2017-10-05

## 2017-10-05 RX ORDER — ZIDOVUDINE 100 MG/1
300 CAPSULE ORAL DAILY
Status: DISCONTINUED | OUTPATIENT
Start: 2017-10-06 | End: 2017-10-05

## 2017-10-05 RX ORDER — LAMIVUDINE 10 MG/ML
25 SOLUTION ORAL DAILY
Qty: 75 ML | Refills: 0 | Status: SHIPPED | OUTPATIENT
Start: 2017-10-05 | End: 2018-10-05

## 2017-10-05 RX ORDER — LAMIVUDINE 10 MG/ML
50 SOLUTION ORAL ONCE
Status: COMPLETED | OUTPATIENT
Start: 2017-10-05 | End: 2017-10-05

## 2017-10-05 RX ORDER — AZITHROMYCIN 600 MG/1
1200 TABLET, FILM COATED ORAL WEEKLY
Qty: 30 TABLET | Refills: 1
Start: 2017-10-07

## 2017-10-05 RX ORDER — LAMIVUDINE 10 MG/ML
50 SOLUTION ORAL ONCE
Status: DISCONTINUED | OUTPATIENT
Start: 2017-10-05 | End: 2017-10-05

## 2017-10-05 RX ORDER — LOPINAVIR AND RITONAVIR 200; 50 MG/1; MG/1
2 TABLET, FILM COATED ORAL 2 TIMES DAILY
Qty: 120 TABLET | Refills: 0 | Status: SHIPPED | OUTPATIENT
Start: 2017-10-05 | End: 2017-10-05

## 2017-10-05 RX ORDER — ZIDOVUDINE 100 MG/1
300 CAPSULE ORAL DAILY
Status: DISCONTINUED | OUTPATIENT
Start: 2017-10-05 | End: 2017-10-05 | Stop reason: HOSPADM

## 2017-10-05 RX ORDER — LAMIVUDINE AND ZIDOVUDINE 150; 300 MG/1; MG/1
1 TABLET, FILM COATED ORAL DAILY
Status: DISCONTINUED | OUTPATIENT
Start: 2017-10-06 | End: 2017-10-05

## 2017-10-05 RX ORDER — SULFAMETHOXAZOLE AND TRIMETHOPRIM 800; 160 MG/1; MG/1
1 TABLET ORAL
Qty: 30 TABLET | Refills: 1
Start: 2017-10-06

## 2017-10-05 RX ORDER — LIDOCAINE 50 MG/G
1 PATCH TOPICAL
Status: DISCONTINUED | OUTPATIENT
Start: 2017-10-05 | End: 2017-10-05 | Stop reason: HOSPADM

## 2017-10-05 RX ORDER — HYDRALAZINE HYDROCHLORIDE 100 MG/1
100 TABLET, FILM COATED ORAL EVERY 8 HOURS
Qty: 90 TABLET | Refills: 1
Start: 2017-10-05 | End: 2018-10-05

## 2017-10-05 RX ORDER — LAMIVUDINE 10 MG/ML
25 SOLUTION ORAL DAILY
Status: DISCONTINUED | OUTPATIENT
Start: 2017-10-06 | End: 2017-10-05 | Stop reason: HOSPADM

## 2017-10-05 RX ADMIN — ACETAMINOPHEN 650 MG: 650 SOLUTION ORAL at 05:10

## 2017-10-05 RX ADMIN — LOPINAVIR AND RITONAVIR 2 TABLET: 200; 50 TABLET, FILM COATED ORAL at 04:10

## 2017-10-05 RX ADMIN — LAMIVUDINE 50 MG: 10 SOLUTION ORAL at 04:10

## 2017-10-05 RX ADMIN — SODIUM CHLORIDE: 0.9 INJECTION, SOLUTION INTRAVENOUS at 09:10

## 2017-10-05 RX ADMIN — LIDOCAINE 1 PATCH: 50 PATCH TOPICAL at 03:10

## 2017-10-05 RX ADMIN — LEVETIRACETAM 500 MG: 500 TABLET ORAL at 03:10

## 2017-10-05 RX ADMIN — ZIDOVUDINE 300 MG: 100 CAPSULE ORAL at 04:10

## 2017-10-05 RX ADMIN — HYDRALAZINE HYDROCHLORIDE 100 MG: 50 TABLET ORAL at 03:10

## 2017-10-05 RX ADMIN — HEPARIN SODIUM 1000 UNITS: 1000 INJECTION, SOLUTION INTRAVENOUS; SUBCUTANEOUS at 01:10

## 2017-10-05 RX ADMIN — ONDANSETRON 8 MG: 2 INJECTION INTRAMUSCULAR; INTRAVENOUS at 11:10

## 2017-10-05 RX ADMIN — AMLODIPINE BESYLATE 5 MG: 5 TABLET ORAL at 12:10

## 2017-10-05 RX ADMIN — HYDRALAZINE HYDROCHLORIDE 100 MG: 50 TABLET ORAL at 05:10

## 2017-10-05 NOTE — ASSESSMENT & PLAN NOTE
- Patient with negative RPR and minimally reactive FTA-ABS.  - Will send treponemal antibody test as confirmatory testing.   - Patient received penicillin IM 2.4 million units x 1.   - Treponemal antibody negative. No indication for further treatment with penicillin.

## 2017-10-05 NOTE — SUBJECTIVE & OBJECTIVE
Interval History: NAEON. Patient complaining of low back pain this AM. Otherwise no new complaints. Patient was febrile up to 100.5 yesterday.    Review of Systems   Constitutional: Positive for fever. Negative for chills.   HENT: Negative for congestion and sore throat.    Eyes: Negative for photophobia and visual disturbance.   Respiratory: Positive for cough. Negative for chest tightness and shortness of breath.    Cardiovascular: Negative for chest pain and leg swelling.   Gastrointestinal: Negative for abdominal pain, diarrhea, nausea and vomiting.   Genitourinary: Negative for dysuria and hematuria.   Musculoskeletal: Positive for back pain. Negative for arthralgias and myalgias.   Skin: Negative for rash.   Neurological: Negative for dizziness and headaches.   Psychiatric/Behavioral: Negative for agitation and confusion.     Objective:     Vital Signs (Most Recent):  Temp: 98.1 °F (36.7 °C) (10/05/17 0900)  Pulse: 91 (10/05/17 1215)  Resp: 18 (10/05/17 1145)  BP: (!) 164/105 (10/05/17 1130)  SpO2: 98 % (10/05/17 0741) Vital Signs (24h Range):  Temp:  [98.1 °F (36.7 °C)-100.4 °F (38 °C)] 98.1 °F (36.7 °C)  Pulse:  [] 91  Resp:  [15-18] 18  SpO2:  [93 %-98 %] 98 %  BP: (130-164)/() 164/105     Weight: 68.2 kg (150 lb 5.7 oz)  Body mass index is 20.39 kg/m².  No intake or output data in the 24 hours ending 10/05/17 1308   Physical Exam   Constitutional: He is oriented to person, place, and time. He appears well-developed and well-nourished. No distress.   HENT:   Head: Normocephalic and atraumatic.   Eyes: Conjunctivae are normal. Pupils are equal, round, and reactive to light. No scleral icterus.   Neck: Neck supple. No JVD present.   Cardiovascular: Normal rate, regular rhythm, normal heart sounds and intact distal pulses.    No murmur heard.  Pulmonary/Chest: Effort normal and breath sounds normal. No respiratory distress. He has no wheezes.   Abdominal: Soft. Bowel sounds are normal. He exhibits  no distension. There is no tenderness. There is no guarding.   Musculoskeletal: Normal range of motion. He exhibits no edema.   Lymphadenopathy:     He has no cervical adenopathy.   Neurological: He is alert and oriented to person, place, and time.   Skin: Skin is warm and dry. Capillary refill takes less than 2 seconds. He is not diaphoretic.   Vitals reviewed.      Significant Labs:   Recent Results (from the past 24 hour(s))   Amylase    Collection Time: 10/05/17  3:43 AM   Result Value Ref Range    Amylase 213 (H) 20 - 110 U/L   Lipase    Collection Time: 10/05/17  3:43 AM   Result Value Ref Range    Lipase 358 (H) 4 - 60 U/L   Renal function panel    Collection Time: 10/05/17  3:44 AM   Result Value Ref Range    Glucose 76 70 - 110 mg/dL    Sodium 135 (L) 136 - 145 mmol/L    Potassium 5.1 3.5 - 5.1 mmol/L    Chloride 103 95 - 110 mmol/L    CO2 23 23 - 29 mmol/L    BUN, Bld 39 (H) 6 - 20 mg/dL    Calcium 7.4 (L) 8.7 - 10.5 mg/dL    Creatinine 7.2 (H) 0.5 - 1.4 mg/dL    Albumin 1.6 (L) 3.5 - 5.2 g/dL    Phosphorus 3.8 2.7 - 4.5 mg/dL    eGFR if  9.1 (A) >60 mL/min/1.73 m^2    eGFR if non  7.9 (A) >60 mL/min/1.73 m^2    Anion Gap 9 8 - 16 mmol/L   CBC auto differential    Collection Time: 10/05/17  3:44 AM   Result Value Ref Range    WBC 2.33 (L) 3.90 - 12.70 K/uL    RBC 2.48 (L) 4.60 - 6.20 M/uL    Hemoglobin 7.3 (L) 14.0 - 18.0 g/dL    Hematocrit 22.2 (L) 40.0 - 54.0 %    MCV 90 82 - 98 fL    MCH 29.4 27.0 - 31.0 pg    MCHC 32.9 32.0 - 36.0 g/dL    RDW 17.3 (H) 11.5 - 14.5 %    Platelets 61 (L) 150 - 350 K/uL    MPV SEE COMMENT 9.2 - 12.9 fL    Gran # 1.2 (L) 1.8 - 7.7 K/uL    Lymph # 0.5 (L) 1.0 - 4.8 K/uL    Mono # 0.5 0.3 - 1.0 K/uL    Eos # 0.0 0.0 - 0.5 K/uL    Baso # 0.01 0.00 - 0.20 K/uL    Gran% 52.9 38.0 - 73.0 %    Lymph% 22.7 18.0 - 48.0 %    Mono% 22.3 (H) 4.0 - 15.0 %    Eosinophil% 1.7 0.0 - 8.0 %    Basophil% 0.4 0.0 - 1.9 %    Aniso Slight     Poik Slight     Poly  Occasional     Hypo Occasional     Ovalocytes Occasional     Differential Method Automated    Protime-INR    Collection Time: 10/05/17  3:44 AM   Result Value Ref Range    Prothrombin Time 11.8 9.0 - 12.5 sec    INR 1.1 0.8 - 1.2   APTT    Collection Time: 10/05/17  3:44 AM   Result Value Ref Range    aPTT 31.3 21.0 - 32.0 sec     Microbiology Results (last 7 days)     Procedure Component Value Units Date/Time    Blood culture [[id number]] Collected:  10/04/17 1225    Order Status:  Completed Specimen:  Blood Updated:  10/04/17 1945     Blood Culture, Routine No Growth to date    Narrative:       Collection has been rescheduled by TRM at 10/4/2017 10:55 Reason: drs   in room. will return   Collection has been rescheduled by TRM at 10/4/2017 10:55 Reason: drs   in room. will return     Blood culture [[id number]] Collected:  10/04/17 1231    Order Status:  Completed Specimen:  Blood Updated:  10/04/17 1945     Blood Culture, Routine No Growth to date    Narrative:       Collection has been rescheduled by TRM at 10/4/2017 10:55 Reason: drs   in room. will return   Collection has been rescheduled by TRM at 10/4/2017 10:55 Reason: drs   in room. will return     Urine culture [[id number]]     Order Status:  No result Specimen:  Urine     Blood culture [[id number]] Collected:  09/28/17 1030    Order Status:  Completed Specimen:  Blood from Line, Subclavian, Right Updated:  10/03/17 1412     Blood Culture, Routine No growth after 5 days.    Narrative:       Please draw from R tunneled catheter    Fungus Culture, Blood or Bone Marrow [[id number]] Collected:  09/26/17 0611    Order Status:  Completed Specimen:  Blood from Arm, Left Updated:  10/02/17 1406     Fungus Cult, blood or BM Culture in progress    Blood culture [[id number]] Collected:  09/27/17 0617    Order Status:  Completed Specimen:  Blood from Peripheral, Antecubital, Right Updated:  10/02/17 1012     Blood Culture, Routine No growth after 5 days.    Narrative:        Blood cultures from 2 different sites. 4 bottles total.  Please draw before starting antibiotics.    Blood culture [[id number]] Collected:  09/27/17 0625    Order Status:  Completed Specimen:  Blood from Peripheral, Foot, Left Updated:  10/02/17 1012     Blood Culture, Routine No growth after 5 days.    Narrative:       Blood cultures x 2 different sites. 4 bottles total. Please  draw cultures before administering antibiotics.    CSF culture [[id number]] Collected:  09/26/17 1442    Order Status:  Completed Specimen:  CSF (Spinal Fluid) from CSF Tap, Tube 3 Updated:  10/01/17 0708     CSF CULTURE No Growth     Gram Stain Result Cytospin indicates:      No WBC's      No organisms seen    Culture, Respiratory with Gram Stain [[id number]] Collected:  09/27/17 0709    Order Status:  Completed Specimen:  Respiratory from Endotracheal Aspirate Updated:  09/29/17 1110     Respiratory Culture Normal respiratory brittany     Gram Stain (Respiratory) <10 epithelial cells per low power field.     Gram Stain (Respiratory) Many WBC's     Gram Stain (Respiratory) No organisms seen    Narrative:       Mini-BAL.    Blood culture [[id number]] Collected:  09/24/17 0013    Order Status:  Completed Specimen:  Blood Updated:  09/29/17 0612     Blood Culture, Routine No growth after 5 days.    Narrative:       OBTAIN from Central line please    Blood culture [[id number]] Collected:  09/23/17 2122    Order Status:  Completed Specimen:  Blood Updated:  09/28/17 2312     Blood Culture, Routine No growth after 5 days.    Narrative:       Collection has been rescheduled by RNS at 9/23/2017 20:40 Reason: dr. garza in.  Collection has been rescheduled by RNS at 9/23/2017 20:48 Reason: dr.   still with pt.  Collection has been rescheduled by RNS at 9/23/2017 20:40 Reason: dr. garza in.  Collection has been rescheduled by RNS at 9/23/2017 20:48 Reason: dr.   still with pt.    Blood culture [[id number]] Collected:  09/23/17 2039    Order Status:   Completed Specimen:  Blood Updated:  09/28/17 2310     Blood Culture, Routine No growth after 5 days.              Significant Imaging: No new imaging.
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
## 2017-10-05 NOTE — ASSESSMENT & PLAN NOTE
- History unclear in OSH chart, Presented someone altered, unknown baseline, got worst in hospital s/p seizure with waxing and waning   - Patient notes clumsiness and trouble with fine motor skills at baseline 2/2 to neck injury years ago   - LP deferred due to thrombocytopenia.   - MRI with evidence of cerebritis  -initially intubated 2/2 encephalopathic state, greatly improved since admission, GCS 15, tolerating pureed diet, sitting in bedside chair, stable to TTF  - Concern for HIV encephalitis vs LEAH Virus  - Histo antigen negative. Will d/c amphotericin.   - Per ID:  start lamivudine/zidovudine and Kaletra. Lamivudine will need to be renally dosed. Will start once we know patient will be able to obtain renally dosed lamivudine at discharge.

## 2017-10-05 NOTE — PT/OT/SLP PROGRESS
Physical Therapy      Volodymyrj carlos Pope  MRN: [id number]    Patient not seen today secondary to pt away at dialysis both AM and PM attempt. Will follow-up at next scheduled visit per PT POC.    Mary Charles, PTA
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
## 2017-10-05 NOTE — PROGRESS NOTES
[facility]  Nephrology  Progress Note    Patient Name: Volodymyr Pope  MRN: [id number]  Admission Date: 9/23/2017  Hospital Length of Stay: 12 days  Attending Provider: Brian Vasquez, *   Primary Care Physician: Bulmaro Flores  Principal Problem:Status epilepticus    Subjective:     HPI: 51 yearold male with AIDS (CD 49 not on ART or PPx), ESRD on HD (M,W,F), and seizure disorder presents as a transfer from [facility] for Neurology eval and LP.  Patient presented to OSH on 9/15 from a dialysis center for HTN.  He was admitted for HTN urgency and routine workup revealed thrombocytopenia of 19,000.  His hospital course was complicated by AMS with lethargy.  MRI revealed multiple areas concerning for embolic strokes with seziure activity.  Neurology initially felt his AMS and resulting seizure was 2/2 to stroke and started him on Keppra, ASA and stain.  His mental status initially improved however he was found to be HTN with a fever and spent a brief time in the ICU.  Patient was started on ceftriaxone and Vanc given concern for UTI.  After stepping down to the floor patient was once again found to be lethargic and confused.  Neurology wished to pursue LP but due to platelet count of 52,000 they felt uncomfortable due to lack of Neurosurgery and this is the reason for transfer to [facility].  Other PMH significant for poorly controlled HTN and HIV/?AIDS with uncertain compliance.  Patient reportedly initially was Aox4, but when I see him he is not alert, not answering questions, arousable, but barely.     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    Prior to hospital admit in [place] he was receiving HD at [facility] in [place] on MWF through a R sided tunneled access line (reportedly w/o access problems and access placed in May), his nephrologist in [place] is Dr Hammond, treatment duration 4hrs, EDW: 78 kg, Normal UF 1-2 L, he does have residual renal function, uncertain how much, although noted he has made about 600ccs of urine overnight.    He reportedly received dialysis this past Friday, his lytes, acid/base and volume status are stable this morning, he is actually net negative 400ccs overnight, and appears to be below his EDW.      Interval History: seen in DIDI on HD ,     Review of patient's allergies indicates:  No Known Allergies  Current Facility-Administered Medications   Medication Frequency    0.9%  NaCl infusion PRN    acetaminophen oral solution 650 mg Q6H PRN    amlodipine tablet 5 mg Daily    atorvastatin tablet 10 mg QHS    azithromycin tablet 1,200 mg Weekly    hydrALAZINE tablet 100 mg Q8H    levetiracetam tablet 500 mg BID    lidocaine 5 % patch 1 patch Q24H    sulfamethoxazole-trimethoprim 800-160mg per tablet 1 tablet Every Mon, Wed, Fri       Objective:     Vital Signs (Most Recent):  Temp: 98.1 °F (36.7 °C) (10/05/17 0900)  Pulse: 100 (10/05/17 1045)  Resp: 18 (10/05/17 1045)  BP: (!) 146/100 (10/05/17 1045)  SpO2: 98 % (10/05/17 0741)  O2 Device (Oxygen Therapy): room air (10/05/17 0741) Vital Signs (24h Range):  Temp:  [98.1 °F (36.7 °C)-100.4 °F (38 °C)] 98.1 °F (36.7 °C)  Pulse:  [] 100  Resp:  [15-18] 18  SpO2:  [93 %-98 %] 98 %  BP: (130-161)/() 146/100     Weight: 68.2 kg (150 lb 5.7 oz) (10/03/17 0300)  Body mass index is 20.39 kg/m².  Body surface area is 1.86 meters squared.    No intake/output data recorded.    Physical Exam   Constitutional: He appears well-developed.   HENT:   Head: Atraumatic.   Neck: No JVD present.   Cardiovascular: Normal rate and regular rhythm.  Exam reveals no  friction rub.    Pulmonary/Chest: Effort normal and breath sounds normal.   Abdominal: Soft. He exhibits no distension.   Musculoskeletal: He exhibits no edema.   Skin: Skin is warm.           Assessment/Plan:     ESRD (end stage renal disease)    -Patient is ESRD on HD since May 2017? Underlying etiology uncertain, ?HIV nephropathy, history of non-compliance with HD treatments, s/p stay in the ICU where he received SLED, most recent treatment two days ago x 12hrs, currently on the floor and hemodynamically stable    -will provide his regular HD in the DIDI today            Thank you for your consult. I will follow-up with patient. Please contact us if you have any additional questions.    Félix Gomez MD  Nephrology  [facility]

## 2017-10-05 NOTE — ASSESSMENT & PLAN NOTE
-Patient is ESRD on HD since May 2017? Underlying etiology uncertain, ?HIV nephropathy, history of non-compliance with HD treatments, s/p stay in the ICU where he received SLED, most recent treatment two days ago x 12hrs, currently on the floor and hemodynamically stable    -will provide his regular HD in the DIDI today

## 2017-10-05 NOTE — ASSESSMENT & PLAN NOTE
- Platelet count noted to be low at OSH 19 on admit, attributed to AIDS  - Given transfusion at OSH with good response  - Will continue to monitor and transfuse at < 12 or < 50 if bleeding   - Platelets 69 today

## 2017-10-05 NOTE — ASSESSMENT & PLAN NOTE
FTA-ABS was indeterminate with a negative RPR.  Treponemal antibody was negative.  Patient does not have syphilis and so no further treatment is necessary.

## 2017-10-05 NOTE — CONSULTS
[facility]  Infectious Disease  Consult Note    Patient Name: Volodymyr Pope  MRN: [id number]  Admission Date: 9/23/2017  Hospital Length of Stay: 12 days  Attending Physician: Brian Vasquez, *  Primary Care Provider: Bulmaro Flores     Isolation Status: No active isolations    Patient information was obtained from patient, parent, past medical records and ER records.      Inpatient consult to Infectious Diseases  Consult performed by: PAUL ISRAEL.  Consult ordered by: HASEEB SONG        Assessment/Plan:     AIDS    53 y/o male with PMHX of HIV currently AIDS not currently on HAART therapy and ESRD.  Will start patient on renally dosed version of his outpatient regimen, zidovudine 300 mg once a day, lamivudine 25 mg once a day and kaletra 2 tablets twice a day.  He is also to stay on bactrim DS three times a week for PCP prophylaxis and azithromycin 1200 mg once a week for MAC prophylaxis.        Syphilis    FTA-ABS was indeterminate with a negative RPR.  Treponemal antibody was negative.  Patient does not have syphilis and so no further treatment is necessary.        Acute encephalopathy    53 y/o with AIDS admitted with encephalopathy.  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  Treatment would be to initiate HAART.  Orders placed for HAART.            Thank you for your consult. I will sign off. Please contact us if you have any additional questions.    Paul Israel MD  Infectious Disease  [facility]    Subjective:     Principal Problem: Status epilepticus    HPI: Case of 53 y/o male PMHx AIDS not on HAART, ESRD on HD and seizures. Transferred from OSH on 9/23/17 for management of altered mental status.  He was in the neuro ICU for some time.  Extensive work up was negative.  He has continued to improve.  His mental status is now close to his baseline per his mother who is here.  We are re-consulted as he had some low  grade temperatures.    Past Medical History:   Diagnosis Date    ESRD on hemodialysis     HIV (human immunodeficiency virus infection)     Seizures        Past Surgical History:   Procedure Laterality Date    LUMBAR PUNCTURE  9/26/2017            Review of patient's allergies indicates:  No Known Allergies    Medications:  Prescriptions Prior to Admission   Medication Sig    cloNIDine (CATAPRES) 0.1 MG tablet Take 0.1 mg by mouth 2 (two) times daily.    hydrALAZINE (APRESOLINE) 50 MG tablet Take 50 mg by mouth every 6 (six) hours.     lamivudine-zidovudine 150-300mg (COMBIVIR) 150-300 mg Tab Take 1 tablet by mouth every 12 (twelve) hours.    levetiracetam (KEPPRA) 1000 MG tablet Take 500 mg by mouth every Mon, Wed, Fri. Take after dialysis.     Antibiotics     Start     Stop Route Frequency Ordered    09/30/17 1315  azithromycin tablet 1,200 mg      -- Oral Weekly 09/30/17 1220    09/29/17 1745  sulfamethoxazole-trimethoprim 800-160mg per tablet 1 tablet      -- Oral Every Mon, Wed, Fri 09/29/17 1634        Antifungals     None        Antivirals         Stop Route Frequency     lamivudine      -- Oral Daily     lopinavir-ritonavir 200-50 mg      -- Oral 2 times daily     zidovudine      -- Oral Daily             There is no immunization history on file for this patient.    Family History     Family history is unknown by patient.        Social History     Social History    Marital status:      Spouse name: N/A    Number of children: N/A    Years of education: N/A     Social History Main Topics    Smoking status: Never Smoker    Smokeless tobacco: Never Used    Alcohol use No    Drug use: No    Sexual activity: Yes     Partners: Female     Other Topics Concern    None     Social History Narrative    None     Review of Systems   Constitutional: Positive for chills.   All other systems reviewed and are negative.    Objective:     Vital Signs (Most Recent):  Temp: 98.1 °F (36.7 °C) (10/05/17  0900)  Pulse: 101 (10/05/17 1458)  Resp: 18 (10/05/17 1245)  BP: (!) 175/101 (10/05/17 1245)  SpO2: 98 % (10/05/17 0741) Vital Signs (24h Range):  Temp:  [98.1 °F (36.7 °C)-100.4 °F (38 °C)] 98.1 °F (36.7 °C)  Pulse:  [] 101  Resp:  [15-18] 18  SpO2:  [93 %-98 %] 98 %  BP: (130-175)/() 175/101     Weight: 68.2 kg (150 lb 5.7 oz)  Body mass index is 20.39 kg/m².    Estimated Creatinine Clearance: 11.4 mL/min (based on SCr of 7.2 mg/dL (H)).    Physical Exam   Constitutional: No distress.   Cachectic   HENT:   Head: Atraumatic.   Nose: Nose normal.   Eyes: EOM are normal. Pupils are equal, round, and reactive to light. Right eye exhibits no discharge. Left eye exhibits no discharge. No scleral icterus.   Neck: Neck supple. No JVD present.   Cardiovascular: Normal rate, regular rhythm, normal heart sounds and intact distal pulses.  Exam reveals no gallop and no friction rub.    No murmur heard.  Pulmonary/Chest: Effort normal and breath sounds normal. No respiratory distress. He has no wheezes.   Abdominal: Soft. Bowel sounds are normal. He exhibits no distension. There is no tenderness. There is no guarding.   Musculoskeletal: Normal range of motion.   Neurological: He is alert.   Oriented X2   Skin: Rash (seborrheic dermatitis to face) noted. He is not diaphoretic.       Significant Labs:   Blood Culture:   Recent Labs  Lab 09/27/17  0617 09/27/17  0625 09/28/17  1030 10/04/17  1225 10/04/17  1231   LABBLOO No growth after 5 days. No growth after 5 days. No growth after 5 days. No Growth to date  No Growth to date No Growth to date  No Growth to date     CBC:   Recent Labs  Lab 10/04/17  0532 10/05/17  0344   WBC 2.16* 2.33*   HGB 6.4* 7.3*   HCT 20.0* 22.2*   PLT 52* 61*       Significant Imaging: I have reviewed all pertinent imaging results/findings within the past 24 hours.

## 2017-10-05 NOTE — PLAN OF CARE
[facility]    FACILITY TRANSFER ORDERS      Patient Name: Volodymyr Pope  YOB: 1965    PCP: Bulmaro Flores   PCP Address: [address] [place] [address] / [facility] [address]  PCP Phone Number: [phone]  PCP Fax: [phone]    Encounter Date: 10/05/2017    Admit to: Inpatient Rehab    Vital Signs:  Routine    Diagnoses:   Active Hospital Problems    Diagnosis  POA    Syphilis [A53.9]  Yes    Encephalopathy [G93.40]  Yes    Seizure [R56.9]  Yes    Chronic pancreatitis [K86.1]  Yes    Alteration in skin integrity [R23.9]  Yes    Acute encephalopathy [G93.40]  Yes    AIDS [B20]  Yes    Essential hypertension [I10]  Yes    ESRD (end stage renal disease) [N18.6]  Yes    Thrombocytopenia [D69.6]  Yes      Resolved Hospital Problems    Diagnosis Date Resolved POA    *Status epilepticus [G40.901] 10/05/2017 Yes    Acute respiratory failure with hypoxia and hypercapnia [J96.01, J96.02] 10/04/2017 Yes    [place] coma scale total score 3-8 [R40.2430] 10/04/2017 Yes       Allergies:Review of patient's allergies indicates:  No Known Allergies    Diet: regular diet    Activities: Activity as tolerated    Nursing:      Labs: CBC and CMP Once     CONSULTS:    Physical Therapy to evaluate and treat. , Occupational Therapy to evaluate and treat. and  to evaluate for community resources/long-range planning.    MISCELLANEOUS CARE:  None    WOUND CARE ORDERS  None    Medications: Review discharge medications with patient and family and provide education.      Current Discharge Medication List      START taking these medications    Details   amlodipine (NORVASC) 5 MG tablet Take 1 tablet (5 mg total) by mouth once daily.  Qty: 30 tablet, Refills: 1      atorvastatin (LIPITOR) 10 MG tablet Take 1 tablet (10 mg total) by mouth every evening.  Qty: 90 tablet, Refills: 1      azithromycin (ZITHROMAX) 600 MG Tab Take 2 tablets (1,200 mg total) by mouth once a week.  Qty: 30 tablet,  Refills: 1      lamivudine (EPIVIR) 10 mg/mL Soln Take 2.5 mLs (25 mg total) by mouth once daily.  Qty: 75 mL, Refills: 0      lopinavir-ritonavir 200-50 mg (KALETRA) 200-50 mg Tab Take 2 tablets by mouth 2 (two) times daily.  Qty: 120 tablet, Refills: 0      sulfamethoxazole-trimethoprim 800-160mg (BACTRIM DS) 800-160 mg Tab Take 1 tablet by mouth every Mon, Wed, Fri.  Qty: 30 tablet, Refills: 1      zidovudine (RETROVIR) 300 mg tablet Take 1 tablet (300 mg total) by mouth once daily.  Qty: 30 tablet, Refills: 1         CONTINUE these medications which have CHANGED    Details   hydrALAZINE (APRESOLINE) 100 MG tablet Take 1 tablet (100 mg total) by mouth every 8 (eight) hours.  Qty: 90 tablet, Refills: 1         CONTINUE these medications which have NOT CHANGED    Details   levetiracetam (KEPPRA) 1000 MG tablet Take 500 mg by mouth every Mon, Wed, Fri. Take after dialysis.         STOP taking these medications       cloNIDine (CATAPRES) 0.1 MG tablet Comments:   Reason for Stopping:         lamivudine-zidovudine 150-300mg (COMBIVIR) 150-300 mg Tab Comments:   Reason for Stopping:                    _________________________________  Armaan Jain MD  10/05/2017

## 2017-10-05 NOTE — PT/OT/SLP PROGRESS
"Occupational Therapy  Treatment    Volodymyr Pope   MRN: [id number]   Admitting Diagnosis: Status epilepticus    OT Date of Treatment: 10/05/17   OT Start Time: 0823  OT Stop Time: 0840  OT Total Time (min): 17 min    Billable Minutes:  Therapeutic Exercise 17    General Precautions: Standard, fall, aspiration  Orthopedic Precautions: N/A  Braces: N/A         Subjective:  Communicated with RN prior to session.  Mother states she is returning to [place] and pt is to be transported via ambulance ot rehab in [place]  Pain/Comfort  Pain Rating 1: 0/10    Objective:  Patient found with: blood pressure cuff, telemetry, peripheral IV       Activities of Daily Living:  Grooming Position:  (in bed)  Grooming Level of Assistance: Supervision    Therapeutic Activities and Exercises:  Educated pt and mother on importance of increased participation in self care tasks for functional strength and gains.  Upper extremity exercises with 50 forward punches, 50 upward punches, 50 chest flies, and 25 bridges.  Pt requires support for completion and encouraged 2-3 times a day.     AM-PAC 6 CLICK ADL   How much help from another person does this patient currently need?   1 = Unable, Total/Dependent Assistance  2 = A lot, Maximum/Moderate Assistance  3 = A little, Minimum/Contact Guard/Supervision  4 = None, Modified [place]/Independent    Putting on and taking off regular lower body clothing? : 2  Bathing (including washing, rinsing, drying)?: 2  Toileting, which includes using toilet, bedpan, or urinal? : 2  Putting on and taking off regular upper body clothing?: 3  Taking care of personal grooming such as brushing teeth?: 3  Eating meals?: 3  Total Score: 15     AM-PAC Raw Score CMS "G-Code Modifier Level of Impairment Assistance   6 % Total / Unable   7 - 8 CM 80 - 100% Maximal Assist   9-13 CL 60 - 80% Moderate Assist   14 - 19 CK 40 - 60% Moderate Assist   20 - 22 CJ 20 - 40% Minimal Assist   23 CI 1-20% SBA / CGA   24 CH 0% " Independent/ Mod I       Patient left supine with all lines intact, call button in reach and mother present    ASSESSMENT:  Volodymyr Pope is a 52 y.o. male with a medical diagnosis of Status epilepticus and presents with mother in room.  Patient encouraged to participate in self care.  Mother just shaved patient and encouraged patient to apply facial cream himself as well as oral care.  Patient able to complete but with extra time.  Upper extremity exercises and trunk bridges for strengthening with cues for completion.  Cont with POC .    Rehab identified problem list/impairments: Rehab identified problem list/impairments: weakness, impaired endurance, impaired self care skills, impaired functional mobilty, impaired balance, impaired cognition, decreased upper extremity function, decreased lower extremity function    Rehab potential is fair.    Activity tolerance: Good    Discharge recommendations:       Barriers to discharge: Barriers to Discharge: Decreased caregiver support    Equipment recommendations:       GOALS:    Occupational Therapy Goals        Problem: Occupational Therapy Goal    Goal Priority Disciplines Outcome Interventions   Occupational Therapy Goal     OT, PT/OT Ongoing (interventions implemented as appropriate)    Description:  Goals to be met by: 7 days    Patient will increase functional independence with ADLs by performing:    Feeding with Set-up Assistance (once diet is advanced). Met 10/3  UE Dressing with Moderate Assistance.  LE Dressing with Maximum Assistance.  Grooming while seated with Minimal Assistance.  Toileting from bedside commode with Maximum Assistance for hygiene and clothing management.   Rolling to Bilateral with Modified [place].   Supine to sit with Contact Guard Assistance.  Toilet transfer to bedside commode with Maximum Assistance.                       Plan:  Patient to be seen 5 x/week to address the above listed problems via self-care/home management, therapeutic  activities, therapeutic exercises  Plan of Care expires: 10/31/17  Plan of Care reviewed with: patient         Bridgett Qiu, OT  10/05/2017

## 2017-10-05 NOTE — ASSESSMENT & PLAN NOTE
- Per OSH records previous CD4 count 49, patient reports diagnosis 4 years ago  - Histo antigen negative.  - Continue Azithromycin 1200 mg Weekly.  - Bactrim 800-160mg 1 tablet every Mon Wed Fri.   - Per ID: start lamivudine/zidovudine and Kaletra. Lamivudine will need to be renally dosed. Will start once we know patient will be able to obtain renally dosed lamivudine at discharge.

## 2017-10-05 NOTE — PLAN OF CARE
Problem: Occupational Therapy Goal  Goal: Occupational Therapy Goal  Goals to be met by: 7 days    Patient will increase functional independence with ADLs by performing:    Feeding with Set-up Assistance (once diet is advanced). Met 10/3  UE Dressing with Moderate Assistance.  LE Dressing with Maximum Assistance.  Grooming while seated with Minimal Assistance.  Toileting from bedside commode with Maximum Assistance for hygiene and clothing management.   Rolling to Bilateral with Modified [place].   Supine to sit with Contact Guard Assistance.  Toilet transfer to bedside commode with Maximum Assistance.      Outcome: Ongoing (interventions implemented as appropriate)  Goals addressed but unmet.  Cont with POC.   Bridgett Qiu, OT  10/5/2017

## 2017-10-05 NOTE — SUBJECTIVE & OBJECTIVE
Interval History: seen in [facility] on HD ,     Review of patient's allergies indicates:  No Known Allergies  Current Facility-Administered Medications   Medication Frequency    0.9%  NaCl infusion PRN    acetaminophen oral solution 650 mg Q6H PRN    amlodipine tablet 5 mg Daily    atorvastatin tablet 10 mg QHS    azithromycin tablet 1,200 mg Weekly    hydrALAZINE tablet 100 mg Q8H    levetiracetam tablet 500 mg BID    lidocaine 5 % patch 1 patch Q24H    sulfamethoxazole-trimethoprim 800-160mg per tablet 1 tablet Every Mon, Wed, Fri       Objective:     Vital Signs (Most Recent):  Temp: 98.1 °F (36.7 °C) (10/05/17 0900)  Pulse: 100 (10/05/17 1045)  Resp: 18 (10/05/17 1045)  BP: (!) 146/100 (10/05/17 1045)  SpO2: 98 % (10/05/17 0741)  O2 Device (Oxygen Therapy): room air (10/05/17 0741) Vital Signs (24h Range):  Temp:  [98.1 °F (36.7 °C)-100.4 °F (38 °C)] 98.1 °F (36.7 °C)  Pulse:  [] 100  Resp:  [15-18] 18  SpO2:  [93 %-98 %] 98 %  BP: (130-161)/() 146/100     Weight: 68.2 kg (150 lb 5.7 oz) (10/03/17 0300)  Body mass index is 20.39 kg/m².  Body surface area is 1.86 meters squared.    No intake/output data recorded.    Physical Exam   Constitutional: He appears well-developed.   HENT:   Head: Atraumatic.   Neck: No JVD present.   Cardiovascular: Normal rate and regular rhythm.  Exam reveals no friction rub.    Pulmonary/Chest: Effort normal and breath sounds normal.   Abdominal: Soft. He exhibits no distension.   Musculoskeletal: He exhibits no edema.   Skin: Skin is warm.

## 2017-10-05 NOTE — HOSPITAL COURSE
Mr. Pope is a 53yo man with HIV/AIDS off HAART and ESRD on HD who presented initially to neurocritical care  for encephalopathy and HTN urgency. He was stepped down to hospital medicine on 10/4. MRI was concerning initially for HIV encephalitis vs LEAH virus encephalopathy.  FTA-ABS + and patient was treated with IM PCN x 1. Treponemal antibody was (-). Mental status improved to the point where the patient was alert and oriented x 4. Will resume HAART; he will need close f/u with ID doctor when he returns home to [place].

## 2017-10-05 NOTE — PROGRESS NOTES
HD treatment started and no complications with access to right chest wall catheter. Lines secured and telemetry in place. Complaints of back pain, turned on side for comfort.

## 2017-10-05 NOTE — ASSESSMENT & PLAN NOTE
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 Will D/C amphotericin b  - Per ID: will restart patient's prior HAART regimen - Kaletra and combivir.

## 2017-10-05 NOTE — ASSESSMENT & PLAN NOTE
51 y/o with AIDS admitted with encephalopathy.  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  Treatment would be to initiate HAART.  Orders placed for HAART.

## 2017-10-05 NOTE — NURSING
Patient being discharged to [facility] in [place]. [place] and being transported by his mother. Report being called to nurse Buenrostro at [facility] [phone] per .

## 2017-10-05 NOTE — ASSESSMENT & PLAN NOTE
- Patient is on dialysis every M W F  - Nephrology consulted and following   - Patient received SLED over night 1.4L/24h.  Electrolytes stable.  - Patient will received HD today.

## 2017-10-05 NOTE — ASSESSMENT & PLAN NOTE
53 y/o male with PMHX of HIV currently AIDS not currently on HAART therapy and ESRD.  Will start patient on renally dosed version of his outpatient regimen, zidovudine 300 mg once a day, lamivudine 25 mg once a day and kaletra 2 tablets twice a day.  He is also to stay on bactrim DS three times a week for PCP prophylaxis and azithromycin 1200 mg once a week for MAC prophylaxis.

## 2017-10-05 NOTE — SUBJECTIVE & OBJECTIVE
Past Medical History:   Diagnosis Date    ESRD on hemodialysis     HIV (human immunodeficiency virus infection)     Seizures        Past Surgical History:   Procedure Laterality Date    LUMBAR PUNCTURE  9/26/2017            Review of patient's allergies indicates:  No Known Allergies    Medications:  Prescriptions Prior to Admission   Medication Sig    cloNIDine (CATAPRES) 0.1 MG tablet Take 0.1 mg by mouth 2 (two) times daily.    hydrALAZINE (APRESOLINE) 50 MG tablet Take 50 mg by mouth every 6 (six) hours.     lamivudine-zidovudine 150-300mg (COMBIVIR) 150-300 mg Tab Take 1 tablet by mouth every 12 (twelve) hours.    levetiracetam (KEPPRA) 1000 MG tablet Take 500 mg by mouth every Mon, Wed, Fri. Take after dialysis.     Antibiotics     Start     Stop Route Frequency Ordered    09/30/17 1315  azithromycin tablet 1,200 mg      -- Oral Weekly 09/30/17 1220    09/29/17 1745  sulfamethoxazole-trimethoprim 800-160mg per tablet 1 tablet      -- Oral Every Mon, Wed, Fri 09/29/17 1634        Antifungals     None        Antivirals         Stop Route Frequency     lamivudine      -- Oral Daily     lopinavir-ritonavir 200-50 mg      -- Oral 2 times daily     zidovudine      -- Oral Daily             There is no immunization history on file for this patient.    Family History     Family history is unknown by patient.        Social History     Social History    Marital status:      Spouse name: N/A    Number of children: N/A    Years of education: N/A     Social History Main Topics    Smoking status: Never Smoker    Smokeless tobacco: Never Used    Alcohol use No    Drug use: No    Sexual activity: Yes     Partners: Female     Other Topics Concern    None     Social History Narrative    None     Review of Systems   Constitutional: Positive for chills.   All other systems reviewed and are negative.    Objective:     Vital Signs (Most Recent):  Temp: 98.1 °F (36.7 °C) (10/05/17 0900)  Pulse: 101 (10/05/17  1458)  Resp: 18 (10/05/17 1245)  BP: (!) 175/101 (10/05/17 1245)  SpO2: 98 % (10/05/17 0741) Vital Signs (24h Range):  Temp:  [98.1 °F (36.7 °C)-100.4 °F (38 °C)] 98.1 °F (36.7 °C)  Pulse:  [] 101  Resp:  [15-18] 18  SpO2:  [93 %-98 %] 98 %  BP: (130-175)/() 175/101     Weight: 68.2 kg (150 lb 5.7 oz)  Body mass index is 20.39 kg/m².    Estimated Creatinine Clearance: 11.4 mL/min (based on SCr of 7.2 mg/dL (H)).    Physical Exam   Constitutional: No distress.   Cachectic   HENT:   Head: Atraumatic.   Nose: Nose normal.   Eyes: EOM are normal. Pupils are equal, round, and reactive to light. Right eye exhibits no discharge. Left eye exhibits no discharge. No scleral icterus.   Neck: Neck supple. No JVD present.   Cardiovascular: Normal rate, regular rhythm, normal heart sounds and intact distal pulses.  Exam reveals no gallop and no friction rub.    No murmur heard.  Pulmonary/Chest: Effort normal and breath sounds normal. No respiratory distress. He has no wheezes.   Abdominal: Soft. Bowel sounds are normal. He exhibits no distension. There is no tenderness. There is no guarding.   Musculoskeletal: Normal range of motion.   Neurological: He is alert.   Oriented X2   Skin: Rash (seborrheic dermatitis to face) noted. He is not diaphoretic.       Significant Labs:   Blood Culture:   Recent Labs  Lab 09/27/17  0617 09/27/17  0625 09/28/17  1030 10/04/17  1225 10/04/17  1231   LABBLOO No growth after 5 days. No growth after 5 days. No growth after 5 days. No Growth to date  No Growth to date No Growth to date  No Growth to date     CBC:   Recent Labs  Lab 10/04/17  0532 10/05/17  0344   WBC 2.16* 2.33*   HGB 6.4* 7.3*   HCT 20.0* 22.2*   PLT 52* 61*       Significant Imaging: I have reviewed all pertinent imaging results/findings within the past 24 hours.
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
## 2017-10-06 ENCOUNTER — TELEPHONE (OUTPATIENT)
Dept: PHARMACY | Facility: CLINIC | Age: 52
End: 2017-10-06
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
## 2017-10-08 LAB — HIV GENTYP ISLT: DETECTED

## 2017-10-09 LAB
BACTERIA BLD CULT: NORMAL
BACTERIA BLD CULT: NORMAL

## 2017-10-12 NOTE — PT/OT/SLP DISCHARGE
Occupational Therapy Discharge Summary    Volodymyr Pope  MRN: [id number]   Status epilepticus   Patient Discharged from acute Occupational Therapy on 10/5/17.  Please refer to prior OT note dated on 10/2/17 for functional status.     Assessment:   Patient was discharge unexpectedly.  Information required to complete and accurate discharge summary is unknown.  Refer to therapy initial evaluation and last progress note for initial and most recent functional status and goal achievement.  Recommendations made may be found in medical record.  GOALS:    Occupational Therapy Goals        Problem: Occupational Therapy Goal    Goal Priority Disciplines Outcome Interventions   Occupational Therapy Goal     OT, PT/OT Ongoing (interventions implemented as appropriate)    Description:  Goals to be met by: 7 days    Patient will increase functional independence with ADLs by performing:    Feeding with Set-up Assistance (once diet is advanced). Met 10/3  UE Dressing with Moderate Assistance.  LE Dressing with Maximum Assistance.  Grooming while seated with Minimal Assistance.  Toileting from bedside commode with Maximum Assistance for hygiene and clothing management.   Rolling to Bilateral with Modified [place].   Supine to sit with Contact Guard Assistance.  Toilet transfer to bedside commode with Maximum Assistance.                     Reasons for Discontinuation of Therapy Services  Transfer to alternate level of care.      Plan:  Patient Discharged to: Inpatient Rehab.

## 2017-10-16 ENCOUNTER — TELEPHONE (OUTPATIENT)
Dept: PHARMACY | Facility: CLINIC | Age: 52
End: 2017-10-16

## 2017-10-16 NOTE — ASSESSMENT & PLAN NOTE
- Patient with negative RPR and minimally reactive FTA-ABS.  - Will send treponemal antibody test as confirmatory testing.   - Per ID: penicillin IM 2.4 million units x 1. Pending antibody result will determine duration of treatment.     Wound Care: Bacitracin Bill [id number] For Specimen Handling/Conveyance To Laboratory?: no Hemostasis: Drysol Biopsy Type: H and E Consent: Written consent was obtained and risks were reviewed including but not limited to scarring, infection, bleeding, scabbing, incomplete removal, nerve damage and allergy to anesthesia. Anesthesia Type: 1% lidocaine with epinephrine Type Of Destruction Used: Curettage Electrodesiccation And Curettage Text: The wound bed was treated with electrodesiccation and curettage after the biopsy was performed. Detail Level: Detailed Notification Instructions: Patient will be notified of biopsy results. However, patient instructed to call the office if not contacted within 2 weeks. Anesthesia Volume In Cc: 0.5 Silver Nitrate Text: The wound bed was treated with silver nitrate after the biopsy was performed. Biopsy Method: Double edge Personna blades Curettage Text: The wound bed was treated with curettage after the biopsy was performed. Electrodesiccation Text: The wound bed was treated with electrodesiccation after the biopsy was performed. Additional Anesthesia Volume In Cc (Will Not Render If 0): 0 Billing Type: Third-Party Bill Cryotherapy Text: The wound bed was treated with cryotherapy after the biopsy was performed. Post-Care Instructions: I reviewed with the patient in detail post-care instructions. Patient is to keep the biopsy site dry overnight, and then apply bacitracin twice daily until healed. Patient may apply hydrogen peroxide soaks to remove any crusting. Dressing: bandage

## 2017-10-18 NOTE — TELEPHONE ENCOUNTER
Pharmacy was able to get in touch with patient's POA.  POA informed pharmacy staff that patient is currently hospitalized in [place].  OSP will pursue no further interventions at this time.     LINDA Jacobsen.Ph.  [facility]  Phone: [phone]

## 2017-10-26 LAB — FUNGUS BLD CULT: NORMAL

## 2017-10-27 NOTE — PROCEDURES
DATE OF PROCEDURE:  09/28/2017.    EEG #:  [id number].    LOCATION OF SERVICE:  [facility] [place] [facility].    REQUESTING PHYSICIAN:  Dr. Song, it is a routine EEG.    EEG FINDINGS:  Recording was obtained at the patient's bedside in the ICU.  The   patient was unresponsive, intubated and on a respirator.  Background was very   low amplitude and consisted of rhythmic 2 to 3 Hz delta with the delta amplitude   highest frontally.  There was a mixture of midrange beta activity also seen   diffusely.  There were no lateralized or focal features to this recording.  No   spike or sharp wave activity was seen.  Activation procedures were MRI done on   the same day as the EEG.  The patient received fentanyl and midazolam.  The   prominent beta activity is likely still a residual of the midazolam.    IMPRESSION:  Abnormal EEG with diffuse slowing, which is low amplitude   indicative of a moderate to marked nonfocal and nonspecific encephalopathy.    CLINICAL CORRELATION:  The patient is a 52-year-old male with AIDS, end-stage   renal disease and is on hemodialysis.  He has a history of seizures and presents   with an acute encephalopathy.  This tracing only shows a low amplitude diffuse   slowing indicative of a nonspecific encephalopathy.      RR/HN  dd: 10/26/2017 14:03:42 (CDT)  td: 10/26/2017 22:14:26 (CDT)  Doc ID   #[id number]  Job ID #[id number]    CC:

## 2017-11-28 LAB
ACID FAST MOD KINY STN SPEC: NORMAL
MYCOBACTERIUM SPEC QL CULT: NORMAL
MYCOBACTERIUM SPEC QL CULT: NORMAL

## 2019-06-09 NOTE — ASSESSMENT & PLAN NOTE
- Per OSH records previous CD4 count 49, patient reports diagnosis 4 years ago  - Meets criteria for MAC, Toxo, PCP  - Given Dapsone, Pyrimethamine and Leucovorin on 9/19, dose is weekly for PCP and toxo ppx    - Given azithromycin 9/19 for Mac PPX, dosed weekly   - Patient unaware of previous HAART regimen but does report taking something in the past   - Consider ID consult regarding concern for infection and HIV HAART recommendations        Alert and oriented, no focal deficits, no motor or sensory deficits.

## 2021-10-11 NOTE — TELEPHONE ENCOUNTER
Dr Vasquez and Staff,    [facility] has been attempting to reach Mr. Volodymyr Pope regarding prescription for HAART . After numerous unsuccessful attempts, we are sending a postcard to the address on file. At this point in time, no further contact will be made from [facility] until patient responds to our mail correspondence.    If there is anything else we can do to further assist, please let us know.     Thanks,  LINDA Jacobsen.Ph.  [facility]  Phone: [phone]         No
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
## 2023-01-23 NOTE — ASSESSMENT & PLAN NOTE
Renal following - appreciated  Fistula in place  Consider placing HD cath for possible SLED   [No studies available for review at this time.] : No studies available for review at this time.